# Patient Record
Sex: FEMALE | Race: BLACK OR AFRICAN AMERICAN | Employment: PART TIME | URBAN - METROPOLITAN AREA
[De-identification: names, ages, dates, MRNs, and addresses within clinical notes are randomized per-mention and may not be internally consistent; named-entity substitution may affect disease eponyms.]

---

## 2019-03-12 ENCOUNTER — HOSPITAL ENCOUNTER (OUTPATIENT)
Dept: CT IMAGING | Age: 59
Discharge: HOME | End: 2019-03-12
Attending: ORTHOPAEDIC SURGERY
Payer: COMMERCIAL

## 2019-03-12 DIAGNOSIS — M17.12 OSTEOARTHRITIS OF LEFT KNEE: ICD-10-CM

## 2019-03-12 PROCEDURE — 73700 CT LOWER EXTREMITY W/O DYE: CPT

## 2019-03-27 ENCOUNTER — HOSPITAL ENCOUNTER (OUTPATIENT)
Dept: NON INVASIVE DIAGNOSTICS | Age: 59
Discharge: HOME | End: 2019-03-27
Attending: ORTHOPAEDIC SURGERY
Payer: COMMERCIAL

## 2019-03-27 ENCOUNTER — HOSPITAL ENCOUNTER (OUTPATIENT)
Dept: PREADMISSION TESTING | Age: 59
Discharge: HOME | End: 2019-03-27
Payer: COMMERCIAL

## 2019-03-27 ENCOUNTER — HOSPITAL ENCOUNTER (OUTPATIENT)
Dept: GENERAL RADIOLOGY | Age: 59
Discharge: HOME | End: 2019-03-27
Attending: ORTHOPAEDIC SURGERY
Payer: COMMERCIAL

## 2019-03-27 VITALS
DIASTOLIC BLOOD PRESSURE: 85 MMHG | TEMPERATURE: 98 F | WEIGHT: 216 LBS | BODY MASS INDEX: 36.88 KG/M2 | SYSTOLIC BLOOD PRESSURE: 176 MMHG | HEIGHT: 64 IN | RESPIRATION RATE: 16 BRPM | OXYGEN SATURATION: 100 % | HEART RATE: 60 BPM

## 2019-03-27 LAB
ABO + RH BLD: NORMAL
ALBUMIN SERPL-MCNC: 4.2 G/DL (ref 3.5–5)
ALBUMIN/GLOB SERPL: 1.4 {RATIO} (ref 1.1–2.2)
ALP SERPL-CCNC: 50 U/L (ref 45–117)
ALT SERPL-CCNC: 19 U/L (ref 12–78)
ANION GAP SERPL CALC-SCNC: 3 MMOL/L (ref 5–15)
APPEARANCE UR: CLEAR
APTT PPP: 26.5 SEC (ref 22.1–32)
AST SERPL-CCNC: 10 U/L (ref 15–37)
BACTERIA URNS QL MICRO: NEGATIVE /HPF
BASOPHILS # BLD: 0 K/UL (ref 0–0.1)
BASOPHILS NFR BLD: 1 % (ref 0–1)
BILIRUB SERPL-MCNC: 0.4 MG/DL (ref 0.2–1)
BILIRUB UR QL: NEGATIVE
BLOOD GROUP ANTIBODIES SERPL: NORMAL
BUN SERPL-MCNC: 15 MG/DL (ref 6–20)
BUN/CREAT SERPL: 24 (ref 12–20)
CALCIUM SERPL-MCNC: 9.8 MG/DL (ref 8.5–10.1)
CHLORIDE SERPL-SCNC: 103 MMOL/L (ref 97–108)
CO2 SERPL-SCNC: 34 MMOL/L (ref 21–32)
COLOR UR: NORMAL
CREAT SERPL-MCNC: 0.63 MG/DL (ref 0.55–1.02)
DIFFERENTIAL METHOD BLD: ABNORMAL
EOSINOPHIL # BLD: 0.1 K/UL (ref 0–0.4)
EOSINOPHIL NFR BLD: 2 % (ref 0–7)
EPITH CASTS URNS QL MICRO: NORMAL /LPF
ERYTHROCYTE [DISTWIDTH] IN BLOOD BY AUTOMATED COUNT: 13.9 % (ref 11.5–14.5)
GLOBULIN SER CALC-MCNC: 3.1 G/DL (ref 2–4)
GLUCOSE SERPL-MCNC: 82 MG/DL (ref 65–100)
GLUCOSE UR STRIP.AUTO-MCNC: NEGATIVE MG/DL
HCG UR QL: NEGATIVE
HCT VFR BLD AUTO: 38.1 % (ref 35–47)
HGB BLD-MCNC: 12.2 G/DL (ref 11.5–16)
HGB UR QL STRIP: NEGATIVE
HYALINE CASTS URNS QL MICRO: NORMAL /LPF (ref 0–5)
IMM GRANULOCYTES # BLD AUTO: 0 K/UL (ref 0–0.04)
IMM GRANULOCYTES NFR BLD AUTO: 0 % (ref 0–0.5)
INR PPP: 1 (ref 0.9–1.1)
KETONES UR QL STRIP.AUTO: NEGATIVE MG/DL
LEUKOCYTE ESTERASE UR QL STRIP.AUTO: NEGATIVE
LYMPHOCYTES # BLD: 2.5 K/UL (ref 0.8–3.5)
LYMPHOCYTES NFR BLD: 59 % (ref 12–49)
MCH RBC QN AUTO: 28.2 PG (ref 26–34)
MCHC RBC AUTO-ENTMCNC: 32 G/DL (ref 30–36.5)
MCV RBC AUTO: 88 FL (ref 80–99)
MONOCYTES # BLD: 0.3 K/UL (ref 0–1)
MONOCYTES NFR BLD: 6 % (ref 5–13)
NEUTS SEG # BLD: 1.3 K/UL (ref 1.8–8)
NEUTS SEG NFR BLD: 32 % (ref 32–75)
NITRITE UR QL STRIP.AUTO: NEGATIVE
NRBC # BLD: 0 K/UL (ref 0–0.01)
NRBC BLD-RTO: 0 PER 100 WBC
PH UR STRIP: 7 [PH] (ref 5–8)
PLATELET # BLD AUTO: 264 K/UL (ref 150–400)
PMV BLD AUTO: 11 FL (ref 8.9–12.9)
POTASSIUM SERPL-SCNC: 3.5 MMOL/L (ref 3.5–5.1)
PROT SERPL-MCNC: 7.3 G/DL (ref 6.4–8.2)
PROT UR STRIP-MCNC: NEGATIVE MG/DL
PROTHROMBIN TIME: 10.2 SEC (ref 9–11.1)
RBC # BLD AUTO: 4.33 M/UL (ref 3.8–5.2)
RBC #/AREA URNS HPF: NORMAL /HPF (ref 0–5)
SODIUM SERPL-SCNC: 140 MMOL/L (ref 136–145)
SP GR UR REFRACTOMETRY: 1.01 (ref 1–1.03)
SPECIMEN EXP DATE BLD: NORMAL
THERAPEUTIC RANGE,PTTT: NORMAL SECS (ref 58–77)
UA: UC IF INDICATED,UAUC: NORMAL
UROBILINOGEN UR QL STRIP.AUTO: 1 EU/DL (ref 0.2–1)
WBC # BLD AUTO: 4.2 K/UL (ref 3.6–11)
WBC URNS QL MICRO: NORMAL /HPF (ref 0–4)

## 2019-03-27 PROCEDURE — 83036 HEMOGLOBIN GLYCOSYLATED A1C: CPT

## 2019-03-27 PROCEDURE — 36415 COLL VENOUS BLD VENIPUNCTURE: CPT

## 2019-03-27 PROCEDURE — 81001 URINALYSIS AUTO W/SCOPE: CPT

## 2019-03-27 PROCEDURE — 81025 URINE PREGNANCY TEST: CPT

## 2019-03-27 PROCEDURE — 86900 BLOOD TYPING SEROLOGIC ABO: CPT

## 2019-03-27 PROCEDURE — 71046 X-RAY EXAM CHEST 2 VIEWS: CPT

## 2019-03-27 PROCEDURE — 85610 PROTHROMBIN TIME: CPT

## 2019-03-27 PROCEDURE — 85730 THROMBOPLASTIN TIME PARTIAL: CPT

## 2019-03-27 PROCEDURE — 80053 COMPREHEN METABOLIC PANEL: CPT

## 2019-03-27 PROCEDURE — 85025 COMPLETE CBC W/AUTO DIFF WBC: CPT

## 2019-03-27 PROCEDURE — 93005 ELECTROCARDIOGRAM TRACING: CPT

## 2019-03-27 RX ORDER — GLUCOSAMINE/CHONDR SU A SOD 750-600 MG
2 TABLET ORAL
COMMUNITY
End: 2021-07-12

## 2019-03-27 RX ORDER — ASPIRIN 81 MG/1
TABLET ORAL DAILY
COMMUNITY
End: 2019-04-02

## 2019-03-27 RX ORDER — ACETAMINOPHEN 325 MG/1
TABLET ORAL
COMMUNITY
End: 2019-04-02

## 2019-03-27 RX ORDER — HYDROCHLOROTHIAZIDE 50 MG/1
TABLET ORAL DAILY
COMMUNITY
End: 2019-07-05 | Stop reason: SDUPTHER

## 2019-03-27 RX ORDER — LISINOPRIL 40 MG/1
TABLET ORAL
COMMUNITY
End: 2019-05-02 | Stop reason: SDUPTHER

## 2019-03-27 RX ORDER — DICLOFENAC SODIUM 75 MG/1
TABLET, DELAYED RELEASE ORAL 2 TIMES DAILY
COMMUNITY
End: 2019-04-02 | Stop reason: ALTCHOICE

## 2019-03-27 NOTE — FACE TO FACE
The patient attended the pre-operative joint replacement class. The content of the class was presented using a power point presentation and visual demonstrations specific for patients undergoing total hip and knee replacement surgery. A pre-operative Patient  education booklet specific to hip and knee replacement was given to patient. Incentive spirometer and CHG bath kit were given to patient with written instructions and a demonstration of the equipment was done in class. During class, patient had opportunities to ask questions of the material presented as well as any other concerns about their upcoming surgery. Physical Therapy present in class and educated patient and caregivers primarily on [address], pain management, exercises, mobility expectations, caregiver education, and home safety.  Patient and family attended class.

## 2019-03-27 NOTE — PERIOP NOTES
[address] , [address] [facility]  PRE-ADMISSION TESTING    [phone]     Surgery Date:   Friday, 4/12/19    [address] [facility] staff will call you between 3 and 7pm the Thursday before your surgery with your arrival time. Please call [phone] after 7pm Thursday if you did not receive your arrival time. INSTRUCTIONS BEFORE YOUR SURGERY   When You  Arrive   Arrive at the 2nd [address] [facility] on the day of your surgery  Have your insurance card, photo ID, and any copayment (if needed)     Food   and   Drink   NO food or drink after midnight the night before surgery    This means NO water, gum, mints, coffee, juice, etc.  No alcohol (beer, wine, liquor) 24 hours before and after surgery     Medications to   TAKE   Morning of Surgery   MEDICATIONS TO TAKE THE MORNING OF SURGERY WITH A SIP OF WATER:    HCTZ     Medications  To  STOP  Friday 4/5/19    Non-Steroidal anti-inflammatory Drugs (NSAID's): for example, Ibuprofen (Advil, Motrin), Naproxen (Aleve)   Aspirin, if taking for pain    Herbal supplements, vitamins, and fish oil     This includes Erich's arthritis concoction, cod liver, black seed oil    (Tylenol products may be taken, if needed.)       Bathing Clothing  Jewelry  Valuables      If you shower the morning of surgery, please do not apply anything to your skin (lotions, powders, deodorant, or makeup, especially mascara)   Do not shave or trim anywhere 24 hours before surgery   Wear your hair loose or down; no pony-tails, buns, or metal hair clips   Wear loose, comfortable clothes   Leave money, valuables, and jewelry, including body piercings, at home     Spending the Night Your doctor is keeping you into the hospital after surgery, please leave personal belongings/luggage in your car until you have a hospital room number.     Hospital discharge time is 12 noon  Drivers must be here before 12 noon unless you are told differently Special Instructions · Use Chlorhexidine Care Fusion wash 3 days prior to surgery as instructed. · Incentive spirometer given with instructions to practice at home and bring back to the hospital on the day of surgery. · [facility] will contact you if your Hemoglobin A1C is greater than 7.5. · Nutritional information, Pain pamphlet, & Call Don't Fall reminder given. ·  parking is complimentary Monday - Friday 7 am - 5 pm.  · Bring Medication list (with last doses) on the day of surgery. · If you become ill the week before your surgery, please call Dr. Lainey Garzon to discuss. If a situation occurs and you are delayed the day of surgery, call [phone]. The patient was contacted  in person. Home medication reviewed and verified during PAT appointment. The patient verbalizes understanding of all instructions and does not  need reinforcement.

## 2019-03-28 LAB
ATRIAL RATE: 54 BPM
BACTERIA SPEC CULT: NORMAL
BACTERIA SPEC CULT: NORMAL
CALCULATED P AXIS, ECG09: 57 DEGREES
CALCULATED R AXIS, ECG10: 86 DEGREES
CALCULATED T AXIS, ECG11: 16 DEGREES
DIAGNOSIS, 93000: NORMAL
EST. AVERAGE GLUCOSE BLD GHB EST-MCNC: 128 MG/DL
HBA1C MFR BLD: 6.1 % (ref 4.2–6.3)
P-R INTERVAL, ECG05: 148 MS
Q-T INTERVAL, ECG07: 446 MS
QRS DURATION, ECG06: 90 MS
QTC CALCULATION (BEZET), ECG08: 422 MS
SERVICE CMNT-IMP: NORMAL
VENTRICULAR RATE, ECG03: 54 BPM

## 2019-03-28 NOTE — H&P
PAT Pre-Op History & Physical    Patient: Yakov Lawson                  MRN: [id number]          SSN: [id number]  YOB: 1960          Age: 64 y.o. Sex: female                Subjective:   Patient is a 64 y.o.  female who presents with history of chronic left knee pain for many years. She started working at [employer] one year ago and she states standing on the concrete floors has made the pain worse. The pain is constant. She notes some swelling but denies buckling. She had a fall about 6 weeks ago. Her pain is a 10/10. She can only walk short distances. She states neither foot will  on its own, she drags her toes a lot. She has failed injections, lidocaine patches, Tylenol and NSAID. The patient was evaluated in the surgeon's office and it was determined that the most appropriate plan of care is to proceed with surgical intervention. Patient's PCP Ward Andino MD      Past Medical History:   Diagnosis Date    Chronic constipation     Hypertension     Obesity, Class II, BMI 35-39.9     Pre-diabetes 2019    A1C- 6.1    PVC (premature ventricular contraction)     States never been to cardio- has had for many years      Past Surgical History:   Procedure Laterality Date    HX  SECTION N/A     x 3    HX COLONOSCOPY      HX TONSILLECTOMY      HX WISDOM TEETH EXTRACTION Bilateral       Prior to Admission medications    Medication Sig Start Date End Date Taking? Authorizing Provider   verapamil HCl (VERAPAMIL PO) Take 75 mg by mouth daily. Yes Provider, Historical   hydroCHLOROthiazide (HYDRODIURIL) 50 mg tablet Take  by mouth daily. Yes Provider, Historical   lisinopril (PRINIVIL, ZESTRIL) 40 mg tablet Take  by mouth daily. Yes Provider, Historical   diclofenac EC (VOLTAREN) 75 mg EC tablet Take  by mouth two (2) times a day.    Yes Provider, Historical   acetaminophen (TYLENOL) 325 mg tablet Take  by mouth every four (4) hours as needed for Pain. Yes Provider, Historical   aspirin delayed-release 81 mg tablet Take  by mouth daily. Yes Provider, Historical   cannabidiol, CBD, extract 100 mg/mL soln Take  by mouth. Yes Provider, Historical   COD LIVER OIL, BULK, by Does Not Apply route. Yes Provider, Historical   OTHER ginkgo biloba   Yes Provider, Historical   OTHER Black seed oil   Yes Provider, Historical   acetaminophen 500 mg tab 500 mg, diphenhydrAMINE 25 mg cap 25 mg Take  by mouth nightly as needed. Yes Provider, Historical   glucosamine/chondr brown A sod (GLUCOSAMINE-CHONDROITIN) 750-600 mg tab Take 2 Tabs by mouth. Yes Provider, Historical     Current Outpatient Medications   Medication Sig    verapamil HCl (VERAPAMIL PO) Take 75 mg by mouth daily.  hydroCHLOROthiazide (HYDRODIURIL) 50 mg tablet Take  by mouth daily.  lisinopril (PRINIVIL, ZESTRIL) 40 mg tablet Take  by mouth daily.  diclofenac EC (VOLTAREN) 75 mg EC tablet Take  by mouth two (2) times a day.  acetaminophen (TYLENOL) 325 mg tablet Take  by mouth every four (4) hours as needed for Pain.  aspirin delayed-release 81 mg tablet Take  by mouth daily.  cannabidiol, CBD, extract 100 mg/mL soln Take  by mouth.  COD LIVER OIL, BULK, by Does Not Apply route.  OTHER ginkgo biloba    OTHER Black seed oil    acetaminophen 500 mg tab 500 mg, diphenhydrAMINE 25 mg cap 25 mg Take  by mouth nightly as needed.  glucosamine/chondr brown A sod (GLUCOSAMINE-CHONDROITIN) 750-600 mg tab Take 2 Tabs by mouth. No current facility-administered medications for this encounter.        No Known Allergies   Social History     Tobacco Use    Smoking status: Never Smoker    Smokeless tobacco: Never Used   Substance Use Topics    Alcohol use: Never     Frequency: Never      Social History     Substance and Sexual Activity   Drug Use Never     Family History   Problem Relation Age of Onset    Diabetes Mother          Review of Systems    Patient denies difficulty swallowing, mouth sores, or loose teeth. Patient denies any recent dental procedures or any planned prior to surgery. Patient denies chest pain, tightness, pain radiating down left arm, palpitations. Denies dizziness, visual disturbances, or lightheadedness. Patient denies shortness of breath, wheezing, cough, fever, or chills. Patient denies diarrhea, constipation, or abdominal pain. Patient denies urinary problems including dysuria, hesitancy, urgency, or incontinence. Denies skin breakdown, rashes, insect bites or open area. Objective: Eura Chetan Visit Vitals  /85 (BP 1 Location: Left arm, BP Patient Position: At rest;Sitting)   Pulse 60   Temp 98 °F (36.7 °C)   Resp 16   Ht 5' 4\" (1.626 m)   Wt 98 kg (216 lb)   SpO2 100%   BMI 37.08 kg/m²       Body mass index is 37.08 kg/m². Wt Readings from Last 1 Encounters:   03/27/19 98 kg (216 lb)        Physical Exam:     General: Pleasant,  cooperative, no apparent distress, appears stated age. Eyes: Conjunctivae/corneas clear. EOMs intact. Nose: Nares normal.   Mouth/Throat: Lips, mucosa, and tongue normal. Teeth and gums normal.   Lungs: Clear to auscultation bilaterally. Heart: Regular rate and rhythm, S1, S2 normal. No murmur, click, rub or gallop. Abdomen: Soft, non-tender. Bowel sounds normal. No distention. Musculoskeletal:  Gait antalgic. Extremities:  Extremities normal, atraumatic, no cyanosis or edema. Calves                                 supple, non tender to palpation. Pulses: 2+ and symmetric bilateral upper extremities. Cap. refill <2 seconds   Skin: Skin color, texture, turgor normal. No visible open areas, examined fully clothed   Neurologic: CN II-XII grossly intact. Alert and oriented x3.     Labs:   Recent Results (from the past 72 hour(s))   TYPE & SCREEN    Collection Time: 03/27/19  3:10 PM   Result Value Ref Range    Crossmatch Expiration 04/10/2019     ABO/Rh(D) AB POSITIVE     Antibody screen NEG CBC WITH AUTOMATED DIFF    Collection Time: 03/27/19  3:10 PM   Result Value Ref Range    WBC 4.2 3.6 - 11.0 K/uL    RBC 4.33 3.80 - 5.20 M/uL    HGB 12.2 11.5 - 16.0 g/dL    HCT 38.1 35.0 - 47.0 %    MCV 88.0 80.0 - 99.0 FL    MCH 28.2 26.0 - 34.0 PG    MCHC 32.0 30.0 - 36.5 g/dL    RDW 13.9 11.5 - 14.5 %    PLATELET 495 158 - 091 K/uL    MPV 11.0 8.9 - 12.9 FL    NRBC 0.0 0  WBC    ABSOLUTE NRBC 0.00 0.00 - 0.01 K/uL    NEUTROPHILS 32 32 - 75 %    LYMPHOCYTES 59 (H) 12 - 49 %    MONOCYTES 6 5 - 13 %    EOSINOPHILS 2 0 - 7 %    BASOPHILS 1 0 - 1 %    IMMATURE GRANULOCYTES 0 0.0 - 0.5 %    ABS. NEUTROPHILS 1.3 (L) 1.8 - 8.0 K/UL    ABS. LYMPHOCYTES 2.5 0.8 - 3.5 K/UL    ABS. MONOCYTES 0.3 0.0 - 1.0 K/UL    ABS. EOSINOPHILS 0.1 0.0 - 0.4 K/UL    ABS. BASOPHILS 0.0 0.0 - 0.1 K/UL    ABS. IMM. GRANS. 0.0 0.00 - 0.04 K/UL    DF AUTOMATED     METABOLIC PANEL, COMPREHENSIVE    Collection Time: 03/27/19  3:10 PM   Result Value Ref Range    Sodium 140 136 - 145 mmol/L    Potassium 3.5 3.5 - 5.1 mmol/L    Chloride 103 97 - 108 mmol/L    CO2 34 (H) 21 - 32 mmol/L    Anion gap 3 (L) 5 - 15 mmol/L    Glucose 82 65 - 100 mg/dL    BUN 15 6 - 20 MG/DL    Creatinine 0.63 0.55 - 1.02 MG/DL    BUN/Creatinine ratio 24 (H) 12 - 20      GFR est AA >60 >60 ml/min/1.73m2    GFR est non-AA >60 >60 ml/min/1.73m2    Calcium 9.8 8.5 - 10.1 MG/DL    Bilirubin, total 0.4 0.2 - 1.0 MG/DL    ALT (SGPT) 19 12 - 78 U/L    AST (SGOT) 10 (L) 15 - 37 U/L    Alk.  phosphatase 50 45 - 117 U/L    Protein, total 7.3 6.4 - 8.2 g/dL    Albumin 4.2 3.5 - 5.0 g/dL    Globulin 3.1 2.0 - 4.0 g/dL    A-G Ratio 1.4 1.1 - 2.2     HCG URINE, QL    Collection Time: 03/27/19  3:10 PM   Result Value Ref Range    HCG urine, QL NEGATIVE  NEG     HEMOGLOBIN A1C WITH EAG    Collection Time: 03/27/19  3:10 PM   Result Value Ref Range    Hemoglobin A1c 6.1 4.2 - 6.3 %    Est. average glucose 128 mg/dL   CULTURE, MRSA    Collection Time: 03/27/19  3:10 PM   Result Value Ref Range    Special Requests: NO SPECIAL REQUESTS      Culture result: MRSA NOT PRESENT  AT 17 HOURS     PROTHROMBIN TIME + INR    Collection Time: 03/27/19  3:10 PM   Result Value Ref Range    INR 1.0 0.9 - 1.1      Prothrombin time 10.2 9.0 - 11.1 sec   PTT    Collection Time: 03/27/19  3:10 PM   Result Value Ref Range    aPTT 26.5 22.1 - 32.0 sec    aPTT, therapeutic range     58.0 - 77.0 SECS   URINALYSIS W/ REFLEX CULTURE    Collection Time: 03/27/19  3:10 PM   Result Value Ref Range    Color YELLOW/STRAW      Appearance CLEAR CLEAR      Specific gravity 1.009 1.003 - 1.030      pH (UA) 7.0 5.0 - 8.0      Protein NEGATIVE  NEG mg/dL    Glucose NEGATIVE  NEG mg/dL    Ketone NEGATIVE  NEG mg/dL    Bilirubin NEGATIVE  NEG      Blood NEGATIVE  NEG      Urobilinogen 1.0 0.2 - 1.0 EU/dL    Nitrites NEGATIVE  NEG      Leukocyte Esterase NEGATIVE  NEG      WBC 0-4 0 - 4 /hpf    RBC 0-5 0 - 5 /hpf    Epithelial cells FEW FEW /lpf    Bacteria NEGATIVE  NEG /hpf    UA:UC IF INDICATED CULTURE NOT INDICATED BY UA RESULT CNI      Hyaline cast 0-2 0 - 5 /lpf   EKG, 12 LEAD, INITIAL    Collection Time: 03/27/19  4:02 PM   Result Value Ref Range    Ventricular Rate 54 BPM    Atrial Rate 54 BPM    P-R Interval 148 ms    QRS Duration 90 ms    Q-T Interval 446 ms    QTC Calculation (Bezet) 422 ms    Calculated P Axis 57 degrees    Calculated R Axis 86 degrees    Calculated T Axis 16 degrees    Diagnosis       Sinus bradycardia  Minor nonspecific st&t changes  Otherwise normal ECG  No previous ECGs available  Confirmed by Catalino Mota MD, [id number] ([id number]) on 3/28/2019 8:34:06 AM         Assessment:     OA Left Knee    Plan:     Scheduled for Left Total Knee Arthroplasty    Labs and EKG reviewed. A1C shows pre-dm. She does not have a documented PCP at this time.  MRSA pending    Danyelle Galaviz NP

## 2019-04-02 ENCOUNTER — OFFICE VISIT (OUTPATIENT)
Dept: INTERNAL MEDICINE CLINIC | Age: 59
End: 2019-04-02

## 2019-04-02 VITALS
WEIGHT: 216 LBS | HEIGHT: 64 IN | DIASTOLIC BLOOD PRESSURE: 90 MMHG | BODY MASS INDEX: 36.88 KG/M2 | TEMPERATURE: 97.8 F | SYSTOLIC BLOOD PRESSURE: 151 MMHG | HEART RATE: 77 BPM | OXYGEN SATURATION: 99 % | RESPIRATION RATE: 18 BRPM

## 2019-04-02 DIAGNOSIS — Z12.39 SCREENING FOR BREAST CANCER: ICD-10-CM

## 2019-04-02 DIAGNOSIS — I10 ESSENTIAL HYPERTENSION: Primary | ICD-10-CM

## 2019-04-02 PROBLEM — E66.01 SEVERE OBESITY (HCC): Status: ACTIVE | Noted: 2019-04-02

## 2019-04-02 RX ORDER — CARVEDILOL 3.12 MG/1
3.12 TABLET ORAL 2 TIMES DAILY WITH MEALS
Qty: 60 TAB | Refills: 1 | Status: SHIPPED | OUTPATIENT
Start: 2019-04-02 | End: 2019-05-22 | Stop reason: SDUPTHER

## 2019-04-02 RX ORDER — LIDOCAINE 50 MG/G
1 PATCH TOPICAL
COMMUNITY
Start: 2019-02-12 | End: 2019-04-13

## 2019-04-02 RX ORDER — MELOXICAM 15 MG/1
15 TABLET ORAL
COMMUNITY
Start: 2019-02-05 | End: 2019-04-02

## 2019-04-02 RX ORDER — VERAPAMIL HYDROCHLORIDE 240 MG/1
240 TABLET, FILM COATED, EXTENDED RELEASE ORAL
Qty: 30 TAB | Refills: 0
Start: 2019-04-02 | End: 2019-07-05 | Stop reason: SDUPTHER

## 2019-04-02 NOTE — LETTER
NOTIFICATION RETURN TO WORK / SCHOOL 
 
4/2/2019 9:00 AM 
 
Ms. Karina Fink 
[address] [facility] [id number] To Whom It May Concern: 
 
Velia Austin is currently under the care of [facility]. She will not be able to work on 04/03, 04/04, 04/04 due to severe knee pain. She has a knee replacement scheduled for next week. If there are questions or concerns please have the patient contact our office. Sincerely, Vadim Trujillo MD

## 2019-04-02 NOTE — PROGRESS NOTES
Ms. Radha Regan is a new patient who is here to establish care. CC: 
Establish Care and Pre-op Exam (knee surgery ) HTN 
  
HPI: 
 
HTN: noted higher since having increased pain in the left knee. Currently on lisinopril 40mg, HCTZ 50mg and verapamil 240 mg Patient's blood pressure is elevated today 151/90 on repeat manually No dyspnea and no CP Has been working on healthy diet, stopped bread and sugar and sodas. Left knee pain is severe and will have replacement next week Benito Works at [employer]  Has 6 children Reports colonoscopy last year normal at [facility] - could not located records Overdue for mammogram 
Pap 2 year ago Cannot recall TDAP shot Review of systems: 
Constitutional: negative for fever, chills, weight loss, night sweats Eyes : negative for vision changes, eye pain and discharge Nose and Throat: negative for tinnitus, sore throat Cardiovascular: negative for chest pain, palpitations and shortness of breath Respiratory: negative for shortness of breath, cough and wheezing Gastroinstestinal: negative for abdominal pain, nausea, vomiting, diarrhea, constipation, and blood in the stool Musculoskeletal:see HPI Genitourinary: negative for dysuria, nocturia, polyuria and hematuria Neurologic: Negative for focal weakness, numbness or incoordination Skin: negative for rash, pruritus Hematologic: negative for easy bruising Past Medical History:  
Diagnosis Date  Chronic constipation  Hypertension  Obesity, Class II, BMI 35-39.9  Pre-diabetes 2019 A1C- 6.1  PVC (premature ventricular contraction) States never been to cardio- has had for many years Past Surgical History:  
Procedure Laterality Date  HX  SECTION N/A   
 x 3  
 HX COLONOSCOPY    
 HX TONSILLECTOMY  HX WISDOM TEETH EXTRACTION Bilateral   
 
 
No Known Allergies Current Outpatient Medications on File Prior to Visit Medication Sig Dispense Refill  lidocaine (LIDODERM) 5 % Apply 1 Patch to affected area.  hydroCHLOROthiazide (HYDRODIURIL) 50 mg tablet Take  by mouth daily.  lisinopril (PRINIVIL, ZESTRIL) 40 mg tablet Take  by mouth daily.  cannabidiol, CBD, extract 100 mg/mL soln Take  by mouth.  COD LIVER OIL, BULK, by Does Not Apply route.  OTHER ginkgo biloba    
 OTHER Black seed oil  glucosamine/chondr brown A sod (GLUCOSAMINE-CHONDROITIN) 750-600 mg tab Take 2 Tabs by mouth. No current facility-administered medications on file prior to visit. family history includes Diabetes in her mother. Social History Socioeconomic History  Marital status:  Spouse name: Not on file  Number of children: Not on file  Years of education: Not on file  Highest education level: Not on file Occupational History  Not on file Social Needs  Financial resource strain: Not on file  Food insecurity:  
  Worry: Not on file Inability: Not on file  Transportation needs:  
  Medical: Not on file Non-medical: Not on file Tobacco Use  Smoking status: Never Smoker  Smokeless tobacco: Never Used Substance and Sexual Activity  Alcohol use: Never Frequency: Never  Drug use: Never  Sexual activity: Yes Lifestyle  Physical activity:  
  Days per week: Not on file Minutes per session: Not on file  Stress: Not on file Relationships  Social connections:  
  Talks on phone: Not on file Gets together: Not on file Attends Scientologist service: Not on file Active member of club or organization: Not on file Attends meetings of clubs or organizations: Not on file Relationship status: Not on file  Intimate partner violence:  
  Fear of current or ex partner: Not on file Emotionally abused: Not on file Physically abused: Not on file Forced sexual activity: Not on file Other Topics Concern  Not on file Social History Narrative  Not on file Visit Vitals /90 (BP 1 Location: Right arm, BP Patient Position: Sitting) Pulse 77 Temp 97.8 °F (36.6 °C) (Oral) Resp 18 Ht 5' 4\" (1.626 m) Wt 216 lb (98 kg) SpO2 99% BMI 37.08 kg/m² General:  Well appearing female no acute distress HEENT:   PERRL,normal conjunctiva. External ear and canals normal, TMs normal.  Hearing normal to voice. Nose without edema or discharge, normal septum. Lips, teeth, gums normal.  Oropharynx: no erythema, no exudates, no lesions, normal tongue. Neck:  Supple. Thyroid normal size, nontender, without nodules. No carotid bruit. No masses or lymphadenopathy Respiratory: no respiratory distress,  no wheezing, no rhonchi, no rales. No chest wall tenderness. Cardiovascular:  RRR, normal S1S2, no murmur. Gastrointestinal: normal bowel sounds, soft, nontender, without masses. No hepatosplenomegaly. Extremities +2 pulses, no edema, normal sensation Musculoskeletal:  Normal gait. Normal digits and nails. Normal strength and tone, no atrophy, and no abnormal movement. Skin:  No rash, no lesions, no ulcers. Skin warm, normal turgor, without induration or nodules. Neuro:  A and OX4, fluent speech, cranial nerves normal 2-12. Sensation normal to light touch. DTR symmetrical 
Psych:  Normal affect Lab Results Component Value Date/Time WBC 4.2 03/27/2019 03:10 PM  
 HGB 12.2 03/27/2019 03:10 PM  
 HCT 38.1 03/27/2019 03:10 PM  
 PLATELET 401 94/73/0645 03:10 PM  
 MCV 88.0 03/27/2019 03:10 PM  
 
Lab Results Component Value Date/Time  Sodium 140 03/27/2019 03:10 PM  
 Potassium 3.5 03/27/2019 03:10 PM  
 Chloride 103 03/27/2019 03:10 PM  
 CO2 34 (H) 03/27/2019 03:10 PM  
 Anion gap 3 (L) 03/27/2019 03:10 PM  
 Glucose 82 03/27/2019 03:10 PM  
 BUN 15 03/27/2019 03:10 PM  
 Creatinine 0.63 03/27/2019 03:10 PM  
 BUN/Creatinine ratio 24 (H) 03/27/2019 03:10 PM  
 GFR est AA >60 03/27/2019 03:10 PM  
 GFR est non-AA >60 03/27/2019 03:10 PM  
 Calcium 9.8 03/27/2019 03:10 PM  
 
No results found for: CHOL, CHOLPOCT, CHOLX, CHLST, CHOLV, HDL, HDLPOC, LDL, LDLCPOC, LDLC, DLDLP, VLDLC, VLDL, TGLX, TRIGL, TRIGP, TGLPOCT, CHHD, CHHDX No results found for: TSH, TSH2, TSH3, TSHP, TSHEXT, TSHEXT Lab Results Component Value Date/Time Hemoglobin A1c 6.1 03/27/2019 03:10 PM  
 
No results found for: Joe Willis, Mami Ortega, KENNETH Fong Assessment and Plan:  
65 yo woman with a hx of HTN and obesity presenting to [facility] care. 1. Essential hypertension 
- not well controlled, currently on verapamil, HTCT 50mg and lisinopril 40mg. Adding coreg low dose today. Patient will return for BP check and pulse check next week  
- carvedilol (COREG) 3.125 mg tablet; Take 1 Tab by mouth two (2) times daily (with meals). Dispense: 60 Tab; Refill: 1 2. Screening for breast cancer - [facility] MAMMO BI SCREENING INCL CAD; Future 3. Knee arthritis: has severe pain in left knee and planned knee replacement next week.   
Pt is low-intermediate risk for a low-intermediate risk surg with adequate functional mets, may proceed with surg without additional cardiac w/u 
 
 
4. Obesity: counseled on weight loss Follow-up and Dispositions · Return in about 1 week (around 4/9/2019) for Blood pressure check.  
  
  
 
Shiela Arvizu MD

## 2019-04-02 NOTE — PROGRESS NOTES
Reviewed record in preparation for visit and have obtained necessary documentation. Identified pt with two pt identifiers(name and ). Chief Complaint Patient presents with [facility] Establish Care  Pre-op Exam  
  knee surgery Health Maintenance Due Topic Date Due  
 Hepatitis C Test  1960  DTaP/Tdap/Td  (1 - Tdap) 1983  Pap Test  1983  Shingles Vaccine (1 of 2) 2012  Mammogram  2012  Stool testing for trace blood  2012 Ms. Alek Courtney has a reminder for a \"due or due soon\" health maintenance. I have asked that she discuss this further with her primary care provider for follow-up on this health maintenance. Coordination of Care Questionnaire: 
:  
 
1) Have you been to an emergency room, urgent care clinic since your last visit? no  
Hospitalized since your last visit? no          
 
2) Have you seen or consulted any other health care providers outside of [address] [place] [address] since your last visit? no  (Include any pap smears or colon screenings in this section.) 3) In the event something were to happen to you and you were unable to speak on your behalf, do you have an Advance Directive/ Living Will in place stating your wishes? NO Do you have an Advance Directive on file? no 
 
4) Are you interested in receiving information on Advance Directives? NO Patient is accompanied by spouse I have received verbal consent from Ирина Nagel to discuss any/all medical information while they are present in the room.

## 2019-04-02 NOTE — LETTER
4/2/2019 9:33 AM 
 
Ms. Karina Fink 
[address] [facility] [phone] To Whom it May Concern: 
 
Pascual Andrew is currently under the care of [facility]. She is not able to work on 04/03, 04/04, and 04/05 due to severe knee pain. She has a knee replacement scheduled for next week. If there are any questions or concerns please have the patient contact our office. Sincerely, Sigifredo Covarrubias MD

## 2019-04-08 NOTE — PERIOP NOTES
Patient called here to update her PTA med list. List updated and reviewed with patient to hold Lisinopril and HCTZ only on am of surgery. Will take other Cardio meds am of surgery including Coreg, and verapamil.

## 2019-04-09 ENCOUNTER — CLINICAL SUPPORT (OUTPATIENT)
Dept: INTERNAL MEDICINE CLINIC | Age: 59
End: 2019-04-09

## 2019-04-09 VITALS
RESPIRATION RATE: 18 BRPM | BODY MASS INDEX: 36.19 KG/M2 | DIASTOLIC BLOOD PRESSURE: 72 MMHG | WEIGHT: 212 LBS | HEART RATE: 64 BPM | SYSTOLIC BLOOD PRESSURE: 107 MMHG | OXYGEN SATURATION: 97 % | HEIGHT: 64 IN

## 2019-04-09 DIAGNOSIS — I10 ESSENTIAL HYPERTENSION: Primary | ICD-10-CM

## 2019-04-11 ENCOUNTER — ANESTHESIA EVENT (OUTPATIENT)
Dept: SURGERY | Age: 59
DRG: 470 | End: 2019-04-11
Payer: COMMERCIAL

## 2019-04-12 ENCOUNTER — HOSPITAL ENCOUNTER (INPATIENT)
Age: 59
LOS: 4 days | Discharge: HOME | DRG: 470 | End: 2019-04-16
Attending: ORTHOPAEDIC SURGERY | Admitting: ORTHOPAEDIC SURGERY
Payer: COMMERCIAL

## 2019-04-12 ENCOUNTER — ANESTHESIA (OUTPATIENT)
Dept: SURGERY | Age: 59
DRG: 470 | End: 2019-04-12
Payer: COMMERCIAL

## 2019-04-12 DIAGNOSIS — M17.11 PRIMARY OSTEOARTHRITIS OF RIGHT KNEE: Primary | ICD-10-CM

## 2019-04-12 LAB
ABO + RH BLD: NORMAL
BLOOD GROUP ANTIBODIES SERPL: NORMAL
SPECIMEN EXP DATE BLD: NORMAL

## 2019-04-12 PROCEDURE — 74011250637 HC RX REV CODE- 250/637: Performed by: ORTHOPAEDIC SURGERY

## 2019-04-12 PROCEDURE — 77030011640 HC PAD GRND REM COVD -A: Performed by: ORTHOPAEDIC SURGERY

## 2019-04-12 PROCEDURE — 77030029828 HC FEM TIB CKPNT KT DISP STRY -B: Performed by: ORTHOPAEDIC SURGERY

## 2019-04-12 PROCEDURE — 74011000250 HC RX REV CODE- 250: Performed by: ORTHOPAEDIC SURGERY

## 2019-04-12 PROCEDURE — 86900 BLOOD TYPING SEROLOGIC ABO: CPT

## 2019-04-12 PROCEDURE — 77030000032 HC CUF TRNQT ZIMM -B: Performed by: ORTHOPAEDIC SURGERY

## 2019-04-12 PROCEDURE — 77030039266 HC ADH SKN EXOFIN S2SG -A: Performed by: ORTHOPAEDIC SURGERY

## 2019-04-12 PROCEDURE — 74011000258 HC RX REV CODE- 258

## 2019-04-12 PROCEDURE — 77030033067 HC SUT PDO STRATFX SPIR J&J -B: Performed by: ORTHOPAEDIC SURGERY

## 2019-04-12 PROCEDURE — 76030000022 HC AMB SURG 2.5 TO 3 HR INTENSV-TIER 1: Performed by: ORTHOPAEDIC SURGERY

## 2019-04-12 PROCEDURE — 77030032490 HC SLV COMPR SCD KNE COVD -B: Performed by: ORTHOPAEDIC SURGERY

## 2019-04-12 PROCEDURE — 74011250636 HC RX REV CODE- 250/636: Performed by: ORTHOPAEDIC SURGERY

## 2019-04-12 PROCEDURE — 77030018822 HC SLV COMPR FT COVD -B

## 2019-04-12 PROCEDURE — 77030003028 HC SUT VCRL J&J -A: Performed by: ORTHOPAEDIC SURGERY

## 2019-04-12 PROCEDURE — C1776 JOINT DEVICE (IMPLANTABLE): HCPCS | Performed by: ORTHOPAEDIC SURGERY

## 2019-04-12 PROCEDURE — 74011250636 HC RX REV CODE- 250/636: Performed by: ANESTHESIOLOGY

## 2019-04-12 PROCEDURE — 74011250637 HC RX REV CODE- 250/637: Performed by: ANESTHESIOLOGY

## 2019-04-12 PROCEDURE — 77030020782 HC GWN BAIR PAWS FLX 3M -B

## 2019-04-12 PROCEDURE — 36415 COLL VENOUS BLD VENIPUNCTURE: CPT

## 2019-04-12 PROCEDURE — 77030013708 HC HNDPC SUC IRR PULS STRY –B: Performed by: ORTHOPAEDIC SURGERY

## 2019-04-12 PROCEDURE — 8E0Y0CZ ROBOTIC ASSISTED PROCEDURE OF LOWER EXTREMITY, OPEN APPROACH: ICD-10-PCS | Performed by: ORTHOPAEDIC SURGERY

## 2019-04-12 PROCEDURE — 74011000272 HC RX REV CODE- 272: Performed by: ORTHOPAEDIC SURGERY

## 2019-04-12 PROCEDURE — 76060000065 HC AMB SURG ANES 2.5 TO 3 HR: Performed by: ORTHOPAEDIC SURGERY

## 2019-04-12 PROCEDURE — 76210000037 HC AMBSU PH I REC 2 TO 2.5 HR: Performed by: ORTHOPAEDIC SURGERY

## 2019-04-12 PROCEDURE — 65270000029 HC RM PRIVATE

## 2019-04-12 PROCEDURE — 74011250636 HC RX REV CODE- 250/636

## 2019-04-12 PROCEDURE — 77030002933 HC SUT MCRYL J&J -A: Performed by: ORTHOPAEDIC SURGERY

## 2019-04-12 PROCEDURE — 77030018836 HC SOL IRR NACL ICUM -A: Performed by: ORTHOPAEDIC SURGERY

## 2019-04-12 PROCEDURE — C1713 ANCHOR/SCREW BN/BN,TIS/BN: HCPCS | Performed by: ORTHOPAEDIC SURGERY

## 2019-04-12 PROCEDURE — 77030031139 HC SUT VCRL2 J&J -A: Performed by: ORTHOPAEDIC SURGERY

## 2019-04-12 PROCEDURE — 77030038149 HC BLD SAW SAG STRY -D: Performed by: ORTHOPAEDIC SURGERY

## 2019-04-12 PROCEDURE — 77030006835 HC BLD SAW SAG STRY -B: Performed by: ORTHOPAEDIC SURGERY

## 2019-04-12 PROCEDURE — 77030028907 HC WRP KNEE WO BGS SOLM -B

## 2019-04-12 PROCEDURE — 77030014647 HC SEAL FBRN TISSL BAXT -D: Performed by: ORTHOPAEDIC SURGERY

## 2019-04-12 PROCEDURE — 74011000250 HC RX REV CODE- 250

## 2019-04-12 PROCEDURE — 77030029820: Performed by: ORTHOPAEDIC SURGERY

## 2019-04-12 PROCEDURE — 77030018673: Performed by: ORTHOPAEDIC SURGERY

## 2019-04-12 PROCEDURE — 0SRD0JZ REPLACEMENT OF LEFT KNEE JOINT WITH SYNTHETIC SUBSTITUTE, OPEN APPROACH: ICD-10-PCS | Performed by: ORTHOPAEDIC SURGERY

## 2019-04-12 PROCEDURE — 77030018846 HC SOL IRR STRL H20 ICUM -A: Performed by: ORTHOPAEDIC SURGERY

## 2019-04-12 DEVICE — CRUCIATE RETAINING FEMORAL
Type: IMPLANTABLE DEVICE | Site: KNEE | Status: FUNCTIONAL
Brand: TRIATHLON

## 2019-04-12 DEVICE — TIBIAL COMPONENT
Type: IMPLANTABLE DEVICE | Site: KNEE | Status: FUNCTIONAL
Brand: TRIATHLON

## 2019-04-12 DEVICE — COMPONENT TOT KNEE HYBRID POROUS X3 TRIATHLON: Type: IMPLANTABLE DEVICE | Site: KNEE | Status: FUNCTIONAL

## 2019-04-12 DEVICE — METAL BACKED PATELLA
Type: IMPLANTABLE DEVICE | Site: KNEE | Status: FUNCTIONAL
Brand: TRIATHLON

## 2019-04-12 DEVICE — TIBIAL BEARING INSERT
Type: IMPLANTABLE DEVICE | Site: KNEE | Status: FUNCTIONAL
Brand: TRIATHLON

## 2019-04-12 RX ORDER — FENTANYL CITRATE 50 UG/ML
INJECTION, SOLUTION INTRAMUSCULAR; INTRAVENOUS AS NEEDED
Status: DISCONTINUED | OUTPATIENT
Start: 2019-04-12 | End: 2019-04-12 | Stop reason: HOSPADM

## 2019-04-12 RX ORDER — ONDANSETRON 4 MG/1
4 TABLET, ORALLY DISINTEGRATING ORAL
Status: DISCONTINUED | OUTPATIENT
Start: 2019-04-14 | End: 2019-04-16 | Stop reason: HOSPADM

## 2019-04-12 RX ORDER — FAMOTIDINE 20 MG/1
20 TABLET, FILM COATED ORAL 2 TIMES DAILY
Status: DISCONTINUED | OUTPATIENT
Start: 2019-04-12 | End: 2019-04-16 | Stop reason: HOSPADM

## 2019-04-12 RX ORDER — ALBUTEROL SULFATE 0.83 MG/ML
2.5 SOLUTION RESPIRATORY (INHALATION) AS NEEDED
Status: DISCONTINUED | OUTPATIENT
Start: 2019-04-12 | End: 2019-04-12 | Stop reason: HOSPADM

## 2019-04-12 RX ORDER — NALOXONE HYDROCHLORIDE 0.4 MG/ML
0.4 INJECTION, SOLUTION INTRAMUSCULAR; INTRAVENOUS; SUBCUTANEOUS AS NEEDED
Status: DISCONTINUED | OUTPATIENT
Start: 2019-04-12 | End: 2019-04-16 | Stop reason: HOSPADM

## 2019-04-12 RX ORDER — KETOROLAC TROMETHAMINE 30 MG/ML
30 INJECTION, SOLUTION INTRAMUSCULAR; INTRAVENOUS EVERY 6 HOURS
Status: COMPLETED | OUTPATIENT
Start: 2019-04-12 | End: 2019-04-13

## 2019-04-12 RX ORDER — PROPOFOL 10 MG/ML
INJECTION, EMULSION INTRAVENOUS AS NEEDED
Status: DISCONTINUED | OUTPATIENT
Start: 2019-04-12 | End: 2019-04-12 | Stop reason: HOSPADM

## 2019-04-12 RX ORDER — FACIAL-BODY WIPES
10 EACH TOPICAL DAILY PRN
Status: DISCONTINUED | OUTPATIENT
Start: 2019-04-14 | End: 2019-04-16 | Stop reason: HOSPADM

## 2019-04-12 RX ORDER — GLYCOPYRROLATE 0.2 MG/ML
INJECTION INTRAMUSCULAR; INTRAVENOUS AS NEEDED
Status: DISCONTINUED | OUTPATIENT
Start: 2019-04-12 | End: 2019-04-12 | Stop reason: HOSPADM

## 2019-04-12 RX ORDER — OXYCODONE HYDROCHLORIDE 5 MG/1
5 TABLET ORAL
Status: DISCONTINUED | OUTPATIENT
Start: 2019-04-12 | End: 2019-04-13

## 2019-04-12 RX ORDER — POLYETHYLENE GLYCOL 3350 17 G/17G
17 POWDER, FOR SOLUTION ORAL DAILY
Status: DISCONTINUED | OUTPATIENT
Start: 2019-04-13 | End: 2019-04-16 | Stop reason: HOSPADM

## 2019-04-12 RX ORDER — SODIUM CHLORIDE, SODIUM LACTATE, POTASSIUM CHLORIDE, CALCIUM CHLORIDE 600; 310; 30; 20 MG/100ML; MG/100ML; MG/100ML; MG/100ML
150 INJECTION, SOLUTION INTRAVENOUS CONTINUOUS
Status: DISCONTINUED | OUTPATIENT
Start: 2019-04-12 | End: 2019-04-12 | Stop reason: HOSPADM

## 2019-04-12 RX ORDER — ACETAMINOPHEN 325 MG/1
975 TABLET ORAL ONCE
Status: COMPLETED | OUTPATIENT
Start: 2019-04-12 | End: 2019-04-12

## 2019-04-12 RX ORDER — SODIUM CHLORIDE 0.9 % (FLUSH) 0.9 %
5-40 SYRINGE (ML) INJECTION AS NEEDED
Status: DISCONTINUED | OUTPATIENT
Start: 2019-04-12 | End: 2019-04-16 | Stop reason: HOSPADM

## 2019-04-12 RX ORDER — SODIUM CHLORIDE, SODIUM LACTATE, POTASSIUM CHLORIDE, CALCIUM CHLORIDE 600; 310; 30; 20 MG/100ML; MG/100ML; MG/100ML; MG/100ML
125 INJECTION, SOLUTION INTRAVENOUS CONTINUOUS
Status: DISCONTINUED | OUTPATIENT
Start: 2019-04-12 | End: 2019-04-12 | Stop reason: HOSPADM

## 2019-04-12 RX ORDER — DIPHENHYDRAMINE HCL 12.5MG/5ML
12.5 ELIXIR ORAL
Status: DISCONTINUED | OUTPATIENT
Start: 2019-04-12 | End: 2019-04-16 | Stop reason: HOSPADM

## 2019-04-12 RX ORDER — ONDANSETRON 2 MG/ML
4 INJECTION INTRAMUSCULAR; INTRAVENOUS
Status: ACTIVE | OUTPATIENT
Start: 2019-04-12 | End: 2019-04-13

## 2019-04-12 RX ORDER — CEFAZOLIN SODIUM/WATER 2 G/20 ML
2 SYRINGE (ML) INTRAVENOUS ONCE
Status: COMPLETED | OUTPATIENT
Start: 2019-04-12 | End: 2019-04-12

## 2019-04-12 RX ORDER — MIDAZOLAM HYDROCHLORIDE 1 MG/ML
INJECTION, SOLUTION INTRAMUSCULAR; INTRAVENOUS AS NEEDED
Status: DISCONTINUED | OUTPATIENT
Start: 2019-04-12 | End: 2019-04-12 | Stop reason: HOSPADM

## 2019-04-12 RX ORDER — ONDANSETRON 2 MG/ML
4 INJECTION INTRAMUSCULAR; INTRAVENOUS AS NEEDED
Status: DISCONTINUED | OUTPATIENT
Start: 2019-04-12 | End: 2019-04-12 | Stop reason: HOSPADM

## 2019-04-12 RX ORDER — DEXAMETHASONE SODIUM PHOSPHATE 10 MG/ML
10 INJECTION INTRAMUSCULAR; INTRAVENOUS ONCE
Status: COMPLETED | OUTPATIENT
Start: 2019-04-13 | End: 2019-04-13

## 2019-04-12 RX ORDER — KETOROLAC TROMETHAMINE 30 MG/ML
15 INJECTION, SOLUTION INTRAMUSCULAR; INTRAVENOUS
Status: DISCONTINUED | OUTPATIENT
Start: 2019-04-12 | End: 2019-04-12 | Stop reason: HOSPADM

## 2019-04-12 RX ORDER — HYDROMORPHONE HYDROCHLORIDE 2 MG/ML
INJECTION, SOLUTION INTRAMUSCULAR; INTRAVENOUS; SUBCUTANEOUS AS NEEDED
Status: DISCONTINUED | OUTPATIENT
Start: 2019-04-12 | End: 2019-04-12 | Stop reason: HOSPADM

## 2019-04-12 RX ORDER — EPHEDRINE SULFATE 50 MG/ML
INJECTION, SOLUTION INTRAVENOUS AS NEEDED
Status: DISCONTINUED | OUTPATIENT
Start: 2019-04-12 | End: 2019-04-12 | Stop reason: HOSPADM

## 2019-04-12 RX ORDER — DEXTROMETHORPHAN HYDROBROMIDE, GUAIFENESIN 5; 100 MG/5ML; MG/5ML
650 LIQUID ORAL EVERY 8 HOURS
COMMUNITY

## 2019-04-12 RX ORDER — ONDANSETRON 2 MG/ML
INJECTION INTRAMUSCULAR; INTRAVENOUS AS NEEDED
Status: DISCONTINUED | OUTPATIENT
Start: 2019-04-12 | End: 2019-04-12 | Stop reason: HOSPADM

## 2019-04-12 RX ORDER — OXYCODONE HYDROCHLORIDE 5 MG/1
10 TABLET ORAL
Status: DISCONTINUED | OUTPATIENT
Start: 2019-04-12 | End: 2019-04-12 | Stop reason: HOSPADM

## 2019-04-12 RX ORDER — AMOXICILLIN 250 MG
1 CAPSULE ORAL 2 TIMES DAILY
Status: DISCONTINUED | OUTPATIENT
Start: 2019-04-12 | End: 2019-04-16 | Stop reason: HOSPADM

## 2019-04-12 RX ORDER — HYDROCHLOROTHIAZIDE 25 MG/1
50 TABLET ORAL DAILY
Status: DISCONTINUED | OUTPATIENT
Start: 2019-04-13 | End: 2019-04-16 | Stop reason: HOSPADM

## 2019-04-12 RX ORDER — KETOROLAC TROMETHAMINE 30 MG/ML
INJECTION, SOLUTION INTRAMUSCULAR; INTRAVENOUS AS NEEDED
Status: DISCONTINUED | OUTPATIENT
Start: 2019-04-12 | End: 2019-04-12 | Stop reason: HOSPADM

## 2019-04-12 RX ORDER — MORPHINE SULFATE 4 MG/ML
2 INJECTION INTRAVENOUS
Status: ACTIVE | OUTPATIENT
Start: 2019-04-12 | End: 2019-04-13

## 2019-04-12 RX ORDER — DICLOFENAC SODIUM 75 MG/1
75 TABLET, DELAYED RELEASE ORAL 2 TIMES DAILY
COMMUNITY
End: 2019-04-16

## 2019-04-12 RX ORDER — ACETAMINOPHEN 325 MG/1
650 TABLET ORAL EVERY 6 HOURS
Status: DISCONTINUED | OUTPATIENT
Start: 2019-04-12 | End: 2019-04-16 | Stop reason: HOSPADM

## 2019-04-12 RX ORDER — ASPIRIN 325 MG
325 TABLET, DELAYED RELEASE (ENTERIC COATED) ORAL 2 TIMES DAILY
Status: DISCONTINUED | OUTPATIENT
Start: 2019-04-12 | End: 2019-04-16 | Stop reason: HOSPADM

## 2019-04-12 RX ORDER — HYDROMORPHONE HYDROCHLORIDE 1 MG/ML
0.5 INJECTION, SOLUTION INTRAMUSCULAR; INTRAVENOUS; SUBCUTANEOUS ONCE
Status: COMPLETED | OUTPATIENT
Start: 2019-04-12 | End: 2019-04-12

## 2019-04-12 RX ORDER — LISINOPRIL 20 MG/1
40 TABLET ORAL DAILY
Status: DISCONTINUED | OUTPATIENT
Start: 2019-04-13 | End: 2019-04-16 | Stop reason: HOSPADM

## 2019-04-12 RX ORDER — SODIUM CHLORIDE 9 MG/ML
125 INJECTION, SOLUTION INTRAVENOUS CONTINUOUS
Status: DISPENSED | OUTPATIENT
Start: 2019-04-12 | End: 2019-04-13

## 2019-04-12 RX ORDER — KETAMINE HYDROCHLORIDE 10 MG/ML
INJECTION, SOLUTION INTRAMUSCULAR; INTRAVENOUS AS NEEDED
Status: DISCONTINUED | OUTPATIENT
Start: 2019-04-12 | End: 2019-04-12 | Stop reason: HOSPADM

## 2019-04-12 RX ORDER — ASPIRIN 81 MG/1
81 TABLET ORAL DAILY
COMMUNITY
End: 2019-04-16

## 2019-04-12 RX ORDER — CARVEDILOL 3.12 MG/1
3.12 TABLET ORAL 2 TIMES DAILY WITH MEALS
Status: DISCONTINUED | OUTPATIENT
Start: 2019-04-12 | End: 2019-04-16 | Stop reason: HOSPADM

## 2019-04-12 RX ORDER — DEXAMETHASONE SODIUM PHOSPHATE 4 MG/ML
INJECTION, SOLUTION INTRA-ARTICULAR; INTRALESIONAL; INTRAMUSCULAR; INTRAVENOUS; SOFT TISSUE AS NEEDED
Status: DISCONTINUED | OUTPATIENT
Start: 2019-04-12 | End: 2019-04-12 | Stop reason: HOSPADM

## 2019-04-12 RX ORDER — VERAPAMIL HYDROCHLORIDE 240 MG/1
240 TABLET, FILM COATED, EXTENDED RELEASE ORAL
Status: DISCONTINUED | OUTPATIENT
Start: 2019-04-12 | End: 2019-04-16 | Stop reason: HOSPADM

## 2019-04-12 RX ORDER — DIPHENHYDRAMINE HYDROCHLORIDE 50 MG/ML
12.5 INJECTION, SOLUTION INTRAMUSCULAR; INTRAVENOUS AS NEEDED
Status: DISCONTINUED | OUTPATIENT
Start: 2019-04-12 | End: 2019-04-12 | Stop reason: HOSPADM

## 2019-04-12 RX ORDER — OXYCODONE HCL 20 MG/1
20 TABLET, FILM COATED, EXTENDED RELEASE ORAL ONCE
Status: COMPLETED | OUTPATIENT
Start: 2019-04-12 | End: 2019-04-12

## 2019-04-12 RX ORDER — CEFAZOLIN SODIUM/WATER 2 G/20 ML
2 SYRINGE (ML) INTRAVENOUS EVERY 8 HOURS
Status: COMPLETED | OUTPATIENT
Start: 2019-04-12 | End: 2019-04-13

## 2019-04-12 RX ORDER — LIDOCAINE HYDROCHLORIDE 10 MG/ML
0.1 INJECTION, SOLUTION EPIDURAL; INFILTRATION; INTRACAUDAL; PERINEURAL AS NEEDED
Status: DISCONTINUED | OUTPATIENT
Start: 2019-04-12 | End: 2019-04-12 | Stop reason: HOSPADM

## 2019-04-12 RX ORDER — OXYCODONE HYDROCHLORIDE 5 MG/1
10 TABLET ORAL
Status: DISCONTINUED | OUTPATIENT
Start: 2019-04-12 | End: 2019-04-13

## 2019-04-12 RX ORDER — HYDROMORPHONE HYDROCHLORIDE 1 MG/ML
0.5 INJECTION, SOLUTION INTRAMUSCULAR; INTRAVENOUS; SUBCUTANEOUS
Status: DISPENSED | OUTPATIENT
Start: 2019-04-12 | End: 2019-04-12

## 2019-04-12 RX ORDER — LIDOCAINE HYDROCHLORIDE 20 MG/ML
INJECTION, SOLUTION EPIDURAL; INFILTRATION; INTRACAUDAL; PERINEURAL AS NEEDED
Status: DISCONTINUED | OUTPATIENT
Start: 2019-04-12 | End: 2019-04-12 | Stop reason: HOSPADM

## 2019-04-12 RX ORDER — SODIUM CHLORIDE 0.9 % (FLUSH) 0.9 %
5-40 SYRINGE (ML) INJECTION EVERY 8 HOURS
Status: DISCONTINUED | OUTPATIENT
Start: 2019-04-12 | End: 2019-04-16 | Stop reason: HOSPADM

## 2019-04-12 RX ORDER — CELECOXIB 100 MG/1
100 CAPSULE ORAL ONCE
Status: COMPLETED | OUTPATIENT
Start: 2019-04-12 | End: 2019-04-12

## 2019-04-12 RX ADMIN — MIDAZOLAM HYDROCHLORIDE 2 MG: 1 INJECTION, SOLUTION INTRAMUSCULAR; INTRAVENOUS at 13:06

## 2019-04-12 RX ADMIN — HYDROMORPHONE HYDROCHLORIDE 0.5 MG: 1 INJECTION, SOLUTION INTRAMUSCULAR; INTRAVENOUS; SUBCUTANEOUS at 17:23

## 2019-04-12 RX ADMIN — CARVEDILOL 3.12 MG: 3.12 TABLET, FILM COATED ORAL at 20:40

## 2019-04-12 RX ADMIN — EPHEDRINE SULFATE 10 MG: 50 INJECTION, SOLUTION INTRAVENOUS at 13:29

## 2019-04-12 RX ADMIN — FAMOTIDINE 20 MG: 20 TABLET ORAL at 20:42

## 2019-04-12 RX ADMIN — KETAMINE HYDROCHLORIDE 20 MG: 10 INJECTION, SOLUTION INTRAMUSCULAR; INTRAVENOUS at 14:42

## 2019-04-12 RX ADMIN — OXYCODONE HYDROCHLORIDE 20 MG: 20 TABLET, FILM COATED, EXTENDED RELEASE ORAL at 11:56

## 2019-04-12 RX ADMIN — VERAPAMIL HYDROCHLORIDE 240 MG: 240 TABLET, FILM COATED, EXTENDED RELEASE ORAL at 22:32

## 2019-04-12 RX ADMIN — HYDROMORPHONE HYDROCHLORIDE 1 MG: 1 INJECTION, SOLUTION INTRAMUSCULAR; INTRAVENOUS; SUBCUTANEOUS at 16:18

## 2019-04-12 RX ADMIN — FENTANYL CITRATE 50 MCG: 50 INJECTION, SOLUTION INTRAMUSCULAR; INTRAVENOUS at 13:10

## 2019-04-12 RX ADMIN — CELECOXIB 100 MG: 100 CAPSULE ORAL at 11:56

## 2019-04-12 RX ADMIN — ASPIRIN 325 MG: 325 TABLET, COATED ORAL at 20:40

## 2019-04-12 RX ADMIN — SODIUM CHLORIDE, SODIUM LACTATE, POTASSIUM CHLORIDE, AND CALCIUM CHLORIDE: 600; 310; 30; 20 INJECTION, SOLUTION INTRAVENOUS at 15:49

## 2019-04-12 RX ADMIN — ACETAMINOPHEN 975 MG: 325 TABLET ORAL at 11:56

## 2019-04-12 RX ADMIN — DEXAMETHASONE SODIUM PHOSPHATE 8 MG: 4 INJECTION, SOLUTION INTRA-ARTICULAR; INTRALESIONAL; INTRAMUSCULAR; INTRAVENOUS; SOFT TISSUE at 13:22

## 2019-04-12 RX ADMIN — Medication 10 ML: at 22:35

## 2019-04-12 RX ADMIN — SODIUM CHLORIDE 125 ML/HR: 900 INJECTION, SOLUTION INTRAVENOUS at 18:50

## 2019-04-12 RX ADMIN — HYDROMORPHONE HYDROCHLORIDE 0.5 MG: 2 INJECTION, SOLUTION INTRAMUSCULAR; INTRAVENOUS; SUBCUTANEOUS at 13:30

## 2019-04-12 RX ADMIN — SODIUM CHLORIDE, SODIUM LACTATE, POTASSIUM CHLORIDE, AND CALCIUM CHLORIDE 150 ML/HR: 600; 310; 30; 20 INJECTION, SOLUTION INTRAVENOUS at 11:56

## 2019-04-12 RX ADMIN — KETOROLAC TROMETHAMINE 30 MG: 30 INJECTION, SOLUTION INTRAMUSCULAR at 22:33

## 2019-04-12 RX ADMIN — FENTANYL CITRATE 50 MCG: 50 INJECTION, SOLUTION INTRAMUSCULAR; INTRAVENOUS at 14:22

## 2019-04-12 RX ADMIN — GLYCOPYRROLATE 0.1 MG: 0.2 INJECTION INTRAMUSCULAR; INTRAVENOUS at 13:39

## 2019-04-12 RX ADMIN — KETAMINE HYDROCHLORIDE 40 MG: 10 INJECTION, SOLUTION INTRAMUSCULAR; INTRAVENOUS at 14:09

## 2019-04-12 RX ADMIN — GLYCOPYRROLATE 0.1 MG: 0.2 INJECTION INTRAMUSCULAR; INTRAVENOUS at 13:41

## 2019-04-12 RX ADMIN — SENNOSIDES, DOCUSATE SODIUM 1 TABLET: 50; 8.6 TABLET, FILM COATED ORAL at 20:41

## 2019-04-12 RX ADMIN — ONDANSETRON 4 MG: 2 INJECTION INTRAMUSCULAR; INTRAVENOUS at 15:34

## 2019-04-12 RX ADMIN — EPHEDRINE SULFATE 10 MG: 50 INJECTION, SOLUTION INTRAVENOUS at 13:27

## 2019-04-12 RX ADMIN — HYDROMORPHONE HYDROCHLORIDE 0.5 MG: 1 INJECTION, SOLUTION INTRAMUSCULAR; INTRAVENOUS; SUBCUTANEOUS at 16:35

## 2019-04-12 RX ADMIN — KETOROLAC TROMETHAMINE 30 MG: 30 INJECTION, SOLUTION INTRAMUSCULAR; INTRAVENOUS at 15:34

## 2019-04-12 RX ADMIN — HYDROMORPHONE HYDROCHLORIDE 0.5 MG: 2 INJECTION, SOLUTION INTRAMUSCULAR; INTRAVENOUS; SUBCUTANEOUS at 15:02

## 2019-04-12 RX ADMIN — ACETAMINOPHEN 650 MG: 325 TABLET, FILM COATED ORAL at 20:41

## 2019-04-12 RX ADMIN — FENTANYL CITRATE 50 MCG: 50 INJECTION, SOLUTION INTRAMUSCULAR; INTRAVENOUS at 13:06

## 2019-04-12 RX ADMIN — Medication 2 G: at 13:15

## 2019-04-12 RX ADMIN — PROPOFOL 200 MG: 10 INJECTION, EMULSION INTRAVENOUS at 13:10

## 2019-04-12 RX ADMIN — EPHEDRINE SULFATE 10 MG: 50 INJECTION, SOLUTION INTRAVENOUS at 13:52

## 2019-04-12 RX ADMIN — LIDOCAINE HYDROCHLORIDE 60 MG: 20 INJECTION, SOLUTION EPIDURAL; INFILTRATION; INTRACAUDAL; PERINEURAL at 13:10

## 2019-04-12 RX ADMIN — SODIUM CHLORIDE, SODIUM LACTATE, POTASSIUM CHLORIDE, AND CALCIUM CHLORIDE: 600; 310; 30; 20 INJECTION, SOLUTION INTRAVENOUS at 13:47

## 2019-04-12 RX ADMIN — FENTANYL CITRATE 25 MCG: 50 INJECTION, SOLUTION INTRAMUSCULAR; INTRAVENOUS at 15:02

## 2019-04-12 RX ADMIN — DIPHENHYDRAMINE HYDROCHLORIDE 12.5 MG: 12.5 SOLUTION ORAL at 22:32

## 2019-04-12 RX ADMIN — FENTANYL CITRATE 50 MCG: 50 INJECTION, SOLUTION INTRAMUSCULAR; INTRAVENOUS at 13:36

## 2019-04-12 RX ADMIN — Medication 2 G: at 20:40

## 2019-04-12 NOTE — BRIEF OP NOTE
BRIEF OPERATIVE NOTE    Date of Procedure: 4/12/2019   Preoperative Diagnosis: LEFT KNEE OA  Postoperative Diagnosis: LEFT KNEE OA    Procedure(s):  LEFT TOTAL KNEE WITH MAKOPLASTY  Surgeon(s) and Role:     Krunal Tinajero MD - Primary         Surgical Assistant:      Surgical Staff:  Circ-1: Gerardo Doshi RN  Circ-Relief: Thuy Niño RN  Scrub Tech-1: Clydell Stage  Scrub RN-2: Amanda Wagoner  Surg Asst-1: Genevia Flattery  Surg Asst-2: Clemmie Shook  Event Time In Time Out   Incision Start 1335    Incision Close       Anesthesia: General   Estimated Blood Loss: minimal  Specimens: * No specimens in log *   Findings: as above   Complications: none  Implants:   Implant Name Type Inv.  Item Serial No.  Lot No. LRB No. Used Action   BASEPLT TIB PC TRITNM SZ 3 -- TRIATHLON - SNA  BASEPLT TIB PC TRITNM SZ 3 -- TRIATHLON NA [employer] [facility] [id number] Left 1 Implanted   COMPNT FEM CR TRIATHLN 3 L PA --  - SNA  COMPNT FEM CR TRIATHLN 3 L PA --  NA [employer] HOW [id number] Left 1 Implanted   INSERT TIB ARTC BRNG SZ3 13MM -- TRIATHLON - SNA  INSERT TIB ARTC BRNG SZ3 13MM -- TRIATHLON NA [employer] [facility] [id number] Left 1 Implanted   PAT W/PA COATING BEAD [id number] -- TRIATHLON - SNA  PAT W/PA COATING BEAD [id number] -- TRIATHLON NA [employer] HOW [id number] Left 1 Implanted

## 2019-04-12 NOTE — ANESTHESIA POSTPROCEDURE EVALUATION
Procedure(s): LEFT TOTAL KNEE WITH MAKOPLASTY. general 
 
Anesthesia Post Evaluation Multimodal analgesia: multimodal analgesia not used between 6 hours prior to anesthesia start to PACU discharge Patient location during evaluation: PACU Patient participation: complete - patient participated Level of consciousness: responsive to verbal stimuli Pain score: 6 Pain management: satisfactory to patient Airway patency: patent Anesthetic complications: no 
Cardiovascular status: acceptable Respiratory status: acceptable Hydration status: acceptable Post anesthesia nausea and vomiting:  none Vitals Value Taken Time /76 4/12/2019  5:30 PM  
Temp 36.7 °C (98.1 °F) 4/12/2019  4:00 PM  
Pulse 79 4/12/2019  5:32 PM  
Resp 14 4/12/2019  5:32 PM  
SpO2 100 % 4/12/2019  5:32 PM  
Vitals shown include unvalidated device data.

## 2019-04-12 NOTE — PERIOP NOTES
TRANSFER - OUT REPORT:    Verbal report given to  Jonathon Mills RN(name) on [address]  being transferred to  Room 409 (unit) for routine progression of care       Report consisted of patients Situation, Background, Assessment and   Recommendations(SBAR). Information from the following report(s) SBAR was reviewed with the receiving nurse. Lines:   Peripheral IV 04/12/19 (Active)   Site Assessment Clean, dry, & intact 4/12/2019  4:09 PM   Phlebitis Assessment 0 4/12/2019  4:09 PM   Infiltration Assessment 0 4/12/2019  4:09 PM   Dressing Status Clean, dry, & intact 4/12/2019  4:09 PM   Dressing Type Transparent 4/12/2019  4:09 PM   Hub Color/Line Status Pink; Infusing 4/12/2019  4:09 PM   Alcohol Cap Used Yes 4/12/2019  4:09 PM        Opportunity for questions and clarification was provided. Patient transported with:   Registered Nurse Bedside report given to [facility], VSS patient arousable and appears to be much more comfortable. Family in waiting area and will visit in 10 minutes.

## 2019-04-12 NOTE — H&P
PRE-OP HISTORY AND PHYSICAL    Subjective:     Patient is a 64 y.o.  female presented with a history of left knee pain. Onset of symptoms was gradual with gradually worsening course since that time. She is being admitted for surgical management of this condition. The indications for the procedure include left knee end stage osteoarthritis affecting daily activities and unresponsive to conservative treatment. Patient Active Problem List    Diagnosis Date Noted    Severe obesity ([address] 2019    Hypertension      Past Medical History:   Diagnosis Date    Chronic constipation     Hypertension     Obesity, Class II, BMI 35-39.9     Pre-diabetes 2019    A1C- 6.1    PVC (premature ventricular contraction)     States never been to cardio- has had for many years      Past Surgical History:   Procedure Laterality Date    HX  SECTION N/A     x 3    HX COLONOSCOPY      HX TONSILLECTOMY      HX WISDOM TEETH EXTRACTION Bilateral       Prior to Admission medications    Medication Sig Start Date End Date Taking? Authorizing Provider   diclofenac EC (VOLTAREN) 75 mg EC tablet Take 75 mg by mouth two (2) times a day. Yes Provider, Historical   acetaminophen (TYLENOL) 650 mg TbER Take 650 mg by mouth every eight (8) hours. Indications: Pain associated with Arthritis   Yes Provider, Historical   aspirin delayed-release 81 mg tablet Take 81 mg by mouth daily. Yes Provider, Historical   verapamil ER (CALAN-SR) 240 mg CR tablet Take 1 Tab by mouth nightly. 19  Yes Appcarlito Pierce MD   carvedilol (COREG) 3.125 mg tablet Take 1 Tab by mouth two (2) times daily (with meals). 19  Yes Appcarlito Pierce MD   hydroCHLOROthiazide (HYDRODIURIL) 50 mg tablet Take  by mouth daily. Yes Provider, Historical   lisinopril (PRINIVIL, ZESTRIL) 40 mg tablet Take  by mouth every morning. Yes Provider, Historical   COD LIVER OIL, BULK, by Does Not Apply route.    Yes Provider, Historical lidocaine (LIDODERM) 5 % Apply 1 Patch to affected area. 2/12/19 4/13/19  Provider, Historical   cannabidiol, CBD, extract 100 mg/mL soln Take  by mouth. Provider, Historical   OTHER ginkgo biloba    Provider, Historical   OTHER Black seed oil    Provider, Historical   glucosamine/chondr brown A sod (GLUCOSAMINE-CHONDROITIN) 750-600 mg tab Take 2 Tabs by mouth. Provider, Historical     No Known Allergies   Social History     Tobacco Use    Smoking status: Never Smoker    Smokeless tobacco: Never Used   Substance Use Topics    Alcohol use: Never     Frequency: Never      Family History   Problem Relation Age of Onset    Diabetes Mother       Review of Systems  A comprehensive review of systems was negative except for that written in the HPI. Objective:     Patient Vitals for the past 8 hrs:   BP Temp Pulse Resp SpO2 Height Weight   04/12/19 1139 127/72 98.4 °F (36.9 °C) 66 13 100 % -- --   04/12/19 1107 -- -- -- -- -- 5' 4\" (1.626 m) 95.5 kg (210 lb 8.6 oz)     Left knee tender to rom, varus deformity, hip nt, nvi, med joint line tenderness    Imaging Review  Left knee severe osteoarthritis with varus collapse    Assessment:     Active Problems:    * No active hospital problems. *      Plan:     The various methods of treatment have been discussed with the patient and family. After consideration of risks, benefits and other options for treatment, the patient has consented to surgical interventions (left total knee arthroplasty with Jeison). Questions were answered and Pre-op teaching was done by staff.

## 2019-04-12 NOTE — ANESTHESIA PREPROCEDURE EVALUATION
Relevant Problems   No relevant active problems       Anesthetic History   No history of anesthetic complications            Review of Systems / Medical History  Patient summary reviewed, nursing notes reviewed and pertinent labs reviewed    Pulmonary  Within defined limits                 Neuro/Psych   Within defined limits           Cardiovascular    Hypertension: well controlled              Exercise tolerance: >4 METS     GI/Hepatic/Renal  Within defined limits              Endo/Other        Morbid obesity     Other Findings              Physical Exam    Airway  Mallampati: II  TM Distance: 4 - 6 cm  Neck ROM: normal range of motion   Mouth opening: Normal     Cardiovascular    Rhythm: regular  Rate: normal         Dental  No notable dental hx       Pulmonary  Breath sounds clear to auscultation               Abdominal         Other Findings            Anesthetic Plan    ASA: 2  Anesthesia type: general          Induction: Intravenous  Anesthetic plan and risks discussed with: Patient

## 2019-04-13 LAB
ANION GAP SERPL CALC-SCNC: 9 MMOL/L (ref 5–15)
BUN SERPL-MCNC: 15 MG/DL (ref 6–20)
BUN/CREAT SERPL: 24 (ref 12–20)
CALCIUM SERPL-MCNC: 8.4 MG/DL (ref 8.5–10.1)
CHLORIDE SERPL-SCNC: 105 MMOL/L (ref 97–108)
CO2 SERPL-SCNC: 26 MMOL/L (ref 21–32)
CREAT SERPL-MCNC: 0.62 MG/DL (ref 0.55–1.02)
GLUCOSE SERPL-MCNC: 89 MG/DL (ref 65–100)
HGB BLD-MCNC: 10.3 G/DL (ref 11.5–16)
POTASSIUM SERPL-SCNC: 3 MMOL/L (ref 3.5–5.1)
SODIUM SERPL-SCNC: 140 MMOL/L (ref 136–145)

## 2019-04-13 PROCEDURE — 85018 HEMOGLOBIN: CPT

## 2019-04-13 PROCEDURE — 97530 THERAPEUTIC ACTIVITIES: CPT

## 2019-04-13 PROCEDURE — 36415 COLL VENOUS BLD VENIPUNCTURE: CPT

## 2019-04-13 PROCEDURE — 74011250637 HC RX REV CODE- 250/637: Performed by: ORTHOPAEDIC SURGERY

## 2019-04-13 PROCEDURE — 97162 PT EVAL MOD COMPLEX 30 MIN: CPT

## 2019-04-13 PROCEDURE — 80048 BASIC METABOLIC PNL TOTAL CA: CPT

## 2019-04-13 PROCEDURE — 97116 GAIT TRAINING THERAPY: CPT

## 2019-04-13 PROCEDURE — 74011250636 HC RX REV CODE- 250/636: Performed by: ORTHOPAEDIC SURGERY

## 2019-04-13 PROCEDURE — 65270000029 HC RM PRIVATE

## 2019-04-13 RX ORDER — HYDROMORPHONE HYDROCHLORIDE 2 MG/1
2 TABLET ORAL
Status: DISCONTINUED | OUTPATIENT
Start: 2019-04-13 | End: 2019-04-16 | Stop reason: HOSPADM

## 2019-04-13 RX ORDER — HYDROMORPHONE HYDROCHLORIDE 2 MG/1
2-4 TABLET ORAL
Status: DISCONTINUED | OUTPATIENT
Start: 2019-04-13 | End: 2019-04-13

## 2019-04-13 RX ORDER — HYDROMORPHONE HYDROCHLORIDE 2 MG/1
4 TABLET ORAL
Status: DISCONTINUED | OUTPATIENT
Start: 2019-04-13 | End: 2019-04-16 | Stop reason: HOSPADM

## 2019-04-13 RX ORDER — PANTOPRAZOLE SODIUM 20 MG/1
20 TABLET, DELAYED RELEASE ORAL DAILY
Qty: 30 TAB | Refills: 0 | Status: SHIPPED | OUTPATIENT
Start: 2019-04-13 | End: 2020-07-23 | Stop reason: ALTCHOICE

## 2019-04-13 RX ORDER — HYDROMORPHONE HYDROCHLORIDE 2 MG/1
2-4 TABLET ORAL
Qty: 30 TAB | Refills: 0 | Status: SHIPPED | OUTPATIENT
Start: 2019-04-13 | End: 2019-04-16

## 2019-04-13 RX ORDER — ASPIRIN 325 MG
325 TABLET ORAL 2 TIMES DAILY
Qty: 60 TAB | Refills: 0 | Status: SHIPPED | OUTPATIENT
Start: 2019-04-13 | End: 2020-07-23 | Stop reason: ALTCHOICE

## 2019-04-13 RX ADMIN — HYDROMORPHONE HYDROCHLORIDE 2 MG: 2 TABLET ORAL at 10:44

## 2019-04-13 RX ADMIN — HYDROCHLOROTHIAZIDE 50 MG: 25 TABLET ORAL at 08:18

## 2019-04-13 RX ADMIN — LISINOPRIL 40 MG: 20 TABLET ORAL at 08:18

## 2019-04-13 RX ADMIN — ACETAMINOPHEN 650 MG: 325 TABLET, FILM COATED ORAL at 15:15

## 2019-04-13 RX ADMIN — HYDROMORPHONE HYDROCHLORIDE 4 MG: 2 TABLET ORAL at 21:28

## 2019-04-13 RX ADMIN — ACETAMINOPHEN 650 MG: 325 TABLET, FILM COATED ORAL at 02:37

## 2019-04-13 RX ADMIN — CARVEDILOL 3.12 MG: 3.12 TABLET, FILM COATED ORAL at 08:18

## 2019-04-13 RX ADMIN — HYDROMORPHONE HYDROCHLORIDE 2 MG: 2 TABLET ORAL at 17:11

## 2019-04-13 RX ADMIN — ACETAMINOPHEN 650 MG: 325 TABLET, FILM COATED ORAL at 20:42

## 2019-04-13 RX ADMIN — POLYETHYLENE GLYCOL 3350 17 G: 17 POWDER, FOR SOLUTION ORAL at 08:19

## 2019-04-13 RX ADMIN — CARVEDILOL 3.12 MG: 3.12 TABLET, FILM COATED ORAL at 16:40

## 2019-04-13 RX ADMIN — DEXAMETHASONE SODIUM PHOSPHATE 10 MG: 10 INJECTION, SOLUTION INTRAMUSCULAR; INTRAVENOUS at 12:58

## 2019-04-13 RX ADMIN — KETOROLAC TROMETHAMINE 30 MG: 30 INJECTION, SOLUTION INTRAMUSCULAR at 16:41

## 2019-04-13 RX ADMIN — VERAPAMIL HYDROCHLORIDE 240 MG: 240 TABLET, FILM COATED, EXTENDED RELEASE ORAL at 21:28

## 2019-04-13 RX ADMIN — OXYCODONE HYDROCHLORIDE 10 MG: 5 TABLET ORAL at 04:08

## 2019-04-13 RX ADMIN — Medication 2 G: at 04:07

## 2019-04-13 RX ADMIN — Medication 10 ML: at 21:29

## 2019-04-13 RX ADMIN — FAMOTIDINE 20 MG: 20 TABLET ORAL at 08:18

## 2019-04-13 RX ADMIN — OXYCODONE HYDROCHLORIDE 10 MG: 5 TABLET ORAL at 08:18

## 2019-04-13 RX ADMIN — HYDROMORPHONE HYDROCHLORIDE 2 MG: 2 TABLET ORAL at 15:15

## 2019-04-13 RX ADMIN — KETOROLAC TROMETHAMINE 30 MG: 30 INJECTION, SOLUTION INTRAMUSCULAR at 10:20

## 2019-04-13 RX ADMIN — ACETAMINOPHEN 650 MG: 325 TABLET, FILM COATED ORAL at 08:18

## 2019-04-13 RX ADMIN — KETOROLAC TROMETHAMINE 30 MG: 30 INJECTION, SOLUTION INTRAMUSCULAR at 04:08

## 2019-04-13 RX ADMIN — OXYCODONE HYDROCHLORIDE 5 MG: 5 TABLET ORAL at 01:02

## 2019-04-13 RX ADMIN — Medication 2 G: at 12:58

## 2019-04-13 RX ADMIN — SENNOSIDES, DOCUSATE SODIUM 1 TABLET: 50; 8.6 TABLET, FILM COATED ORAL at 08:18

## 2019-04-13 RX ADMIN — ASPIRIN 325 MG: 325 TABLET, COATED ORAL at 16:39

## 2019-04-13 RX ADMIN — SENNOSIDES, DOCUSATE SODIUM 1 TABLET: 50; 8.6 TABLET, FILM COATED ORAL at 16:40

## 2019-04-13 RX ADMIN — SODIUM CHLORIDE 125 ML/HR: 900 INJECTION, SOLUTION INTRAVENOUS at 01:05

## 2019-04-13 RX ADMIN — HYDROMORPHONE HYDROCHLORIDE 2 MG: 2 TABLET ORAL at 11:57

## 2019-04-13 RX ADMIN — DIPHENHYDRAMINE HYDROCHLORIDE 12.5 MG: 12.5 SOLUTION ORAL at 23:29

## 2019-04-13 RX ADMIN — Medication 10 ML: at 12:59

## 2019-04-13 RX ADMIN — ASPIRIN 325 MG: 325 TABLET, COATED ORAL at 08:18

## 2019-04-13 NOTE — PROGRESS NOTES
Orthopedic Joint Progress Note    2019  Admit Date: 2019  Admit Diagnosis: Osteoarthritis of right knee, unspecified osteoarthritis type [M17.11]    1 Day Post-Op    Subjective:     Karina Booth describes moderate pain poorly controlled with oxycodone    Review of Systems: A review of systems was negative. Objective:     PT/OT:     PATIENT MOBILITY                           Vital Signs:    Blood pressure 144/73, pulse 71, temperature 97.9 °F (36.6 °C), resp. rate 18, height 5' 4\" (1.626 m), weight 95.5 kg (210 lb 8.6 oz), SpO2 97 %.   Temp (24hrs), Av °F (36.7 °C), Min:97.5 °F (36.4 °C), Max:98.4 °F (36.9 °C)      Pain Control:   Pain Assessment  Pain Scale 1: Numeric (0 - 10)  Pain Intensity 1: 8  Pain Location 1: Knee, Leg, Hip, Back  Pain Orientation 1: Left  Pain Description 1: Aching, Sore  Pain Intervention(s) 1: Medication (see MAR)    Meds:  Current Facility-Administered Medications   Medication Dose Route Frequency    carvedilol (COREG) tablet 3.125 mg  3.125 mg Oral BID WITH MEALS    hydroCHLOROthiazide (HYDRODIURIL) tablet 50 mg  50 mg Oral DAILY    lisinopril (PRINIVIL, ZESTRIL) tablet 40 mg  40 mg Oral DAILY    verapamil ER (CALAN-SR) tablet 240 mg  240 mg Oral QHS    0.9% sodium chloride infusion  125 mL/hr IntraVENous CONTINUOUS    sodium chloride 0.9 % bolus infusion 500 mL  500 mL IntraVENous ONCE PRN    sodium chloride (NS) flush 5-40 mL  5-40 mL IntraVENous Q8H    sodium chloride (NS) flush 5-40 mL  5-40 mL IntraVENous PRN    acetaminophen (TYLENOL) tablet 650 mg  650 mg Oral Q6H    oxyCODONE IR (ROXICODONE) tablet 5 mg  5 mg Oral Q3H PRN    oxyCODONE IR (ROXICODONE) tablet 10 mg  10 mg Oral Q3H PRN    naloxone (NARCAN) injection 0.4 mg  0.4 mg IntraVENous PRN    ondansetron (ZOFRAN) injection 4 mg  4 mg IntraVENous Q4H PRN    dexamethasone (PF) (DECADRON) injection 10 mg  10 mg IntraVENous ONCE    [START ON 2019] ondansetron (ZOFRAN ODT) tablet 4 mg  4 mg Oral Q6H PRN    diphenhydrAMINE (BENADRYL) 12.5 mg/5 mL oral elixir 12.5 mg  12.5 mg Oral Q6H PRN    famotidine (PEPCID) tablet 20 mg  20 mg Oral BID    senna-docusate (PERICOLACE) 8.6-50 mg per tablet 1 Tab  1 Tab Oral BID    polyethylene glycol (MIRALAX) packet 17 g  17 g Oral DAILY    [START ON 4/14/2019] bisacodyl (DULCOLAX) suppository 10 mg  10 mg Rectal DAILY PRN    ketorolac (TORADOL) injection 30 mg  30 mg IntraVENous Q6H    morphine injection 2 mg  2 mg IntraVENous Q3H PRN    ceFAZolin (ANCEF) 2 g/20 mL in sterile water IV syringe  2 g IntraVENous Q8H    aspirin delayed-release tablet 325 mg  325 mg Oral BID        LAB:    Lab Results   Component Value Date/Time    INR 1.0 03/27/2019 03:10 PM     Lab Results   Component Value Date/Time    HGB 10.3 (L) 04/13/2019 07:24 AM    HGB 12.2 03/27/2019 03:10 PM       Wound Knee Left (Active)   Dressing Status Clean, dry, and intact 4/13/2019  4:09 AM   Dressing Type Elastic bandage 4/13/2019  4:09 AM   Incision Site Well Approximated Yes 4/12/2019  3:00 PM   Drainage Amount None 4/13/2019  4:09 AM   Wound Odor None 4/13/2019  4:09 AM   Number of days: 1         Physical Exam:  Musculoskeletal: negative  incision c/d/i, calf soft/nt    Assessment:      Active Problems:    Osteoarthritis of right knee (4/12/2019)         Plan:     Continue PT/OT/Rehab  Consult: Rehab team including PT, OT, recreational therapy, and     [facility]     Plan on discharge home today after therapy assuming she does well and pain better managed with Dilaudid. If not Call me at [phone] and will hold discharge until tomorrow.

## 2019-04-13 NOTE — PROGRESS NOTES
Problem: Mobility Impaired (Adult and Pediatric)  Goal: *Therapy Goal (Edit Goal, Insert Text)  Outcome: Progressing Towards Goal  Initiated 4/13/2019    1. Patient will move from supine to sit and sit to supine in bed with supervision/set-up within 4 days. 2. Patient will perform sit to stand with minimal assistance/contact guard assist within 4 days. 3. Patient will ambulate with supervision/set-up for 50 feet with the least restrictive device within 4 days. 4. Patient will ascend/descend 3 stairs with left handrail(s) with minimal assistance/contact guard assist within 4 days. 5. Patient will perform home exercise program per protocol with minimal assistance/contact guard assist within 4 days. 6. Patient will demonstrate AROM 0-90 degrees in operative joint within 4 days. Note:   PHYSICAL THERAPY TREATMENT  Patient: Kate Rollins (89 y.o. female)  Date: 4/13/2019  Diagnosis: Osteoarthritis of right knee, unspecified osteoarthritis type [M17.11] <principal problem not specified>  Procedure(s) (LRB):  LEFT TOTAL KNEE WITH MAKOPLASTY (Left) 1 Day Post-Op  Precautions: Fall  Chart, physical therapy assessment, plan of care and goals were reviewed. ASSESSMENT:  Pt received in bed, several family members at bedside (6) and asked to leave room during session. Pt reports 7/10 pain 45 min after receiving pain medications. Limited DF on LLE in supine Mod A to come to sitting EOB with increased time. Decreased tolerance for knee flexion. Mod A x2 for sit<>stand using RW. Gait training x 15' using RW with Min A x2 with constant verbal cues for sequencing. Pt demonstrates difficultly advancing LLE, no knee bucking noted. Pt returned to chair pt tearful. Performed seated knee flexion 20 degrees. Pt is not ready to attempt stair training at this time. Agreeable to sit in chair, for one hour. RN notified  Progression toward goals:  ?      Improving appropriately and progressing toward goals  ? Improving slowly and progressing toward goals  ? Not making progress toward goals and plan of care will be adjusted     PLAN:  Patient continues to benefit from skilled intervention to address the above impairments. Continue treatment per established plan of care. Discharge Recommendations:  Home Health  Further Equipment Recommendations for Discharge:  none      SUBJECTIVE:   Patient stated Christiano Hall thought this would be better.     OBJECTIVE DATA SUMMARY:   Critical Behavior:  Neurologic State: Alert  Orientation Level: Oriented X4  Cognition: Appropriate decision making  Safety/Judgement: Awareness of environment  Range of Motion:  AROM: Generally decreased, functional  PROM: Generally decreased, functional              LLE AROM  L Knee Flexion: 20  L Knee Extension: 0     Functional Mobility Training:  Bed Mobility:  Rolling: Moderate assistance  Supine to Sit: Minimum assistance; Additional time  Sit to Supine: Maximum assistance(for LLE)  Scooting: Minimum assistance; Additional time  Level of Assistance: Maximum assistance  Interventions: Demonstration;Manual cues; Safety awareness training;Verbal cues  Transfers:  Sit to Stand: Moderate assistance;Assist x2  Stand to Sit: Minimum assistance; Additional time;Assist x2     Stand Pivot Transfers: Moderate assistance(using RW)  Bed to Chair: Moderate assistance(using RW)              Interventions: Manual cues; Safety awareness training; Tactile cues; Verbal cues  Level of Assistance: Maximum assistance  Balance:  Sitting: Intact; With support  Sitting - Static: Fair (occasional)  Sitting - Dynamic: Fair (occasional)  Standing: Impaired; With support  Standing - Static: Constant support; Fair  Standing - Dynamic : Fair  Ambulation/Gait Training:  Distance (ft): 15 Feet (ft)  Assistive Device: Walker, rolling;Gait belt  Ambulation - Level of Assistance: Minimal assistance;Assist x2; Additional time     Gait Description (WDL): Exceptions to WDL  Gait Abnormalities: Decreased step clearance; Step to gait; Path deviations     Left Side Weight Bearing: As tolerated  Base of Support: Widened     Speed/Elisa: Delayed; Slow  Step Length: Left lengthened;Left shortened                    Stairs: Therapeutic Exercises:     EXERCISE   Sets   Reps   Active Active Assist   Passive Self ROM   Comments   Ankle Pumps  20 ?                                        ?                                        ?                                        ?                                           Quad Sets  10 ?                                        ?                                        ?                                        ?                                           Hamstring Sets   ? ?                                        ?                                        ?                                           Short Arc Quads  10 ?                                        ?                                        ?                                        ?                                           Knee Extension Stretch     ? ?                                          ?                                          ?                                           Heel Slides  10 ?                                        ?                                        ?                                        ?                                           Long Arc Quads   ? ?                                        ?                                        ?                                           Knee Flexion Stretch   ?                                         ?                                        ?                                        ?                                           Straight Leg Raises  5 ?                                        ?                                        ? ?                                             Pain:  Pain Scale 1: Numeric (0 - 10)  Pain Intensity 1: 6  Pain Location 1: Knee;Leg;Hip  Pain Orientation 1: Left  Pain Description 1: Aching; Sore  Pain Intervention(s) 1: Medication (see MAR)  Activity Tolerance:   fair  Please refer to the flowsheet for vital signs taken during this treatment. After treatment:   ? Patient left in no apparent distress sitting up in chair  ? Patient left in no apparent distress in bed  ? Call bell left within reach  ? Nursing notified  ? Caregiver present  ?  Bed alarm activated    COMMUNICATION/COLLABORATION:   The patients plan of care was discussed with: Registered Nurse    Armando Fernandez   Time Calculation: 30 mins

## 2019-04-13 NOTE — DISCHARGE INSTRUCTIONS
[address]  Surgery: Total Knee Replacement  Surgeon:   Ernie Hutchinson MD  Surgery Date:  4/12/2019     To relieve pain:   Use ice/gel packs.  Put the ice pack directly over the wound, or anywhere you are hurting or swollen.  To control pain and swelling, keep ice on regularly, especially after physical activity.  The packs should stay cold for 3-4 hours. When it is not cold anymore, rotate with the packs in the freezer.  Elevate your leg. This will also keep swelling down.  Rest for at least 20 minutes between activity or exercises.  To keep track of your pain medications, write down what you take and when you take it.  The last dose of pain medication you got in the hospital was:       Medication    Dose    Date & Time         Choose your medications based on the pain scale below:     To keep your pain under control, take Tylenol every 6 hours for 14 days - even if you feel like you dont need it.  For mild to moderate pain (4-6 on pain scale), take one pain pill every 3-4 hours as needed.  For severe pain (7-10 on pain scale), take two pain pills every 3-4 hours as needed.  To prevent nausea, take your pain medications with food. Pain Scale                 As your pain lessens:     Slowly start taking less pain medication. You may do this by waiting longer between doses or by taking smaller doses.  Stop using the pain medications as soon as you no longer need it, usually in 2-3 weeks.  Aspirin   To prevent blood clots, you will need to take Aspirin 325 mg twice a day for 30 days.  To prevent stomach upset or bleeding:   Do not take non-steroidal anti-inflammatory medications (Ibuprofen, Advil, Motrin, Naproxen, etc.)    Take Pepcid 20 mg twice a day, or a similar home medication, while you are taking a blood thinner.             OPSITE (Honeycomb dressing)     Keep your clear, waterproof dressing in place for 5-7 days after your leave the hospital.     If you are still having drainage, you will need to change your dressing in 5-7 days. You will be given one extra dressing to use at home.  If there is no more drainage from the wound, you may leave it open to the air. OPSITE DRESSING INSTRUCTIONS                  PRINEO DRESSING INSTRUCTIONS      Prineo is a type of skin glue and mesh that is keeping your wound together.  Leave this covering alone. Do not peel it off.  Do not apply oils or lotions over it.  You may shower with this dressing but do not soak it. Gently pat your wound dry when you get out of the shower.  DO NOTs:   Do not rub your surgical wound   Do not put lotion or oils on your wound.  Do not take a tub bath or go swimming until your doctor says it is ok.  You may shower with this dressing over your wound.  After showering pat the dressing dry.  If you have staples a home health nurse will remove them in about 10 days.  To increase and promote healing:   Stop Smoking (or at least cut back on       Smoking).  Eat a well-balanced diet (high in protein       and vitamin C).  If you have a poor appetite, drink Ensure, Glucerna, or         [place] Instant Breakfast for the next 30 days.  If you are diabetic, you should control your blood         sugars to prevent infection and help your wound         to heal.                         To prevent constipation, stay active and drink plenty of fluid.  While using pain medications, you should also take stool softeners and laxatives, such as Pericolace       and Miralax.  If you are having too many bowel       Movements, then you may need       to stop taking the laxatives.      You should have a bowel movement 3-4 days        after surgery and then at least every other day while       on pain medication.  To improve your recovery, you must be active!  Use your walker and take short walks (in your home)         about every 2 hours during the day.  Try to increase how far you walk each day.  You can put as much weight on your leg as you can tolerate while walking.  To avoid getting a stiff knee, work on getting your knee bent and straight as soon as possible.  Home health physical therapy will come to your       home the day after you leave the hospital.  The       therapist will visit about 4 times over the next        couple of weeks to teach you how to get out of        bed, to safely walk in your home, and to do your        exercises.  NO DRIVING until your surgeon tells you it is ok.  You can return to work when cleared by a physician.  Please call your physician immediately if you have:     Constant bleeding from your wound.  Increasing redness or swelling around your wound (Some warmth, bruising and swelling is normal).  Change in wound drainage (increase in amount, color, or bad smell).  Change in mental status (unusual behavior   Temperature over 101.5 degrees Fahrenheit      Pain or redness in the calf (back of your lower leg - see picture)     Increased swelling of the thigh, ankle, calf, or foot.  Emergency: CALL 911 if you have:     Shortness of breath     Chest pain when you cough or taking a deep breath     Please call your surgeons office at [phone]  for a follow up appointment. _   You should call as soon as you get home or the next day after discharge. Ask to make an appointment in 2 weeks.  If you have questions or concerns during normal business hours, you may reach Dr. Emilio Cole' Team at [phone].
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
## 2019-04-14 LAB
ANION GAP SERPL CALC-SCNC: 5 MMOL/L (ref 5–15)
BUN SERPL-MCNC: 15 MG/DL (ref 6–20)
BUN/CREAT SERPL: 21 (ref 12–20)
CALCIUM SERPL-MCNC: 8.7 MG/DL (ref 8.5–10.1)
CHLORIDE SERPL-SCNC: 105 MMOL/L (ref 97–108)
CO2 SERPL-SCNC: 29 MMOL/L (ref 21–32)
CREAT SERPL-MCNC: 0.72 MG/DL (ref 0.55–1.02)
GLUCOSE SERPL-MCNC: 125 MG/DL (ref 65–100)
HGB BLD-MCNC: 10.2 G/DL (ref 11.5–16)
POTASSIUM SERPL-SCNC: 3.2 MMOL/L (ref 3.5–5.1)
SODIUM SERPL-SCNC: 139 MMOL/L (ref 136–145)

## 2019-04-14 PROCEDURE — 74011250636 HC RX REV CODE- 250/636: Performed by: PHYSICIAN ASSISTANT

## 2019-04-14 PROCEDURE — 94760 N-INVAS EAR/PLS OXIMETRY 1: CPT

## 2019-04-14 PROCEDURE — 65270000029 HC RM PRIVATE

## 2019-04-14 PROCEDURE — 74011250637 HC RX REV CODE- 250/637: Performed by: ORTHOPAEDIC SURGERY

## 2019-04-14 PROCEDURE — 97116 GAIT TRAINING THERAPY: CPT

## 2019-04-14 PROCEDURE — 85018 HEMOGLOBIN: CPT

## 2019-04-14 PROCEDURE — 97530 THERAPEUTIC ACTIVITIES: CPT

## 2019-04-14 PROCEDURE — 36415 COLL VENOUS BLD VENIPUNCTURE: CPT

## 2019-04-14 PROCEDURE — 80048 BASIC METABOLIC PNL TOTAL CA: CPT

## 2019-04-14 RX ORDER — HYDROMORPHONE HYDROCHLORIDE 2 MG/ML
1 INJECTION, SOLUTION INTRAMUSCULAR; INTRAVENOUS; SUBCUTANEOUS
Status: DISCONTINUED | OUTPATIENT
Start: 2019-04-14 | End: 2019-04-16 | Stop reason: HOSPADM

## 2019-04-14 RX ORDER — HYDROMORPHONE HYDROCHLORIDE 2 MG/ML
0.5 INJECTION, SOLUTION INTRAMUSCULAR; INTRAVENOUS; SUBCUTANEOUS
Status: DISCONTINUED | OUTPATIENT
Start: 2019-04-14 | End: 2019-04-16 | Stop reason: HOSPADM

## 2019-04-14 RX ADMIN — ACETAMINOPHEN 650 MG: 325 TABLET, FILM COATED ORAL at 20:32

## 2019-04-14 RX ADMIN — POLYETHYLENE GLYCOL 3350 17 G: 17 POWDER, FOR SOLUTION ORAL at 07:57

## 2019-04-14 RX ADMIN — FAMOTIDINE 20 MG: 20 TABLET ORAL at 17:00

## 2019-04-14 RX ADMIN — HYDROMORPHONE HYDROCHLORIDE 4 MG: 2 TABLET ORAL at 12:05

## 2019-04-14 RX ADMIN — DIPHENHYDRAMINE HYDROCHLORIDE 12.5 MG: 12.5 SOLUTION ORAL at 23:02

## 2019-04-14 RX ADMIN — CARVEDILOL 3.12 MG: 3.12 TABLET, FILM COATED ORAL at 16:17

## 2019-04-14 RX ADMIN — VERAPAMIL HYDROCHLORIDE 240 MG: 240 TABLET, FILM COATED, EXTENDED RELEASE ORAL at 22:28

## 2019-04-14 RX ADMIN — HYDROMORPHONE HYDROCHLORIDE 4 MG: 2 TABLET ORAL at 07:55

## 2019-04-14 RX ADMIN — SENNOSIDES, DOCUSATE SODIUM 1 TABLET: 50; 8.6 TABLET, FILM COATED ORAL at 07:56

## 2019-04-14 RX ADMIN — SENNOSIDES, DOCUSATE SODIUM 1 TABLET: 50; 8.6 TABLET, FILM COATED ORAL at 17:00

## 2019-04-14 RX ADMIN — FAMOTIDINE 20 MG: 20 TABLET ORAL at 07:56

## 2019-04-14 RX ADMIN — Medication 10 ML: at 06:00

## 2019-04-14 RX ADMIN — Medication 10 ML: at 22:28

## 2019-04-14 RX ADMIN — ACETAMINOPHEN 650 MG: 325 TABLET, FILM COATED ORAL at 07:56

## 2019-04-14 RX ADMIN — ASPIRIN 325 MG: 325 TABLET, COATED ORAL at 07:55

## 2019-04-14 RX ADMIN — HYDROMORPHONE HYDROCHLORIDE 0.5 MG: 2 INJECTION INTRAMUSCULAR; INTRAVENOUS; SUBCUTANEOUS at 14:08

## 2019-04-14 RX ADMIN — Medication 10 ML: at 16:18

## 2019-04-14 RX ADMIN — HYDROMORPHONE HYDROCHLORIDE 4 MG: 2 TABLET ORAL at 18:49

## 2019-04-14 RX ADMIN — LISINOPRIL 40 MG: 20 TABLET ORAL at 07:55

## 2019-04-14 RX ADMIN — HYDROMORPHONE HYDROCHLORIDE 4 MG: 2 TABLET ORAL at 23:02

## 2019-04-14 RX ADMIN — HYDROCHLOROTHIAZIDE 50 MG: 25 TABLET ORAL at 07:56

## 2019-04-14 RX ADMIN — HYDROMORPHONE HYDROCHLORIDE 4 MG: 2 TABLET ORAL at 03:40

## 2019-04-14 RX ADMIN — ACETAMINOPHEN 650 MG: 325 TABLET, FILM COATED ORAL at 14:07

## 2019-04-14 RX ADMIN — ASPIRIN 325 MG: 325 TABLET, COATED ORAL at 17:00

## 2019-04-14 RX ADMIN — ACETAMINOPHEN 650 MG: 325 TABLET, FILM COATED ORAL at 03:40

## 2019-04-14 RX ADMIN — CARVEDILOL 3.12 MG: 3.12 TABLET, FILM COATED ORAL at 07:56

## 2019-04-14 NOTE — PROGRESS NOTES
4/14/2019  2:52 PM    Reason for Admission:   Left Knee OA                   RRAT Score:   5                  Plan for utilizing home health: The plan is for the pt to utilize home health services. Current Advanced Directive/Advance Care Plan:  No plan on file    Likelihood of Readmission:  Low                         Transition of Care Plan:      CM met with pt to assess, confirmed demographics. Pt resides in 2 story home with spouse, daughter, and sister-in-law; 3 stairs into entrance, pt moving into downstairs bedroom. Pt has no hx of HH or rehab. Pt has DME cane and bedside commode. Pt has no issues getting scripts, patient uses [employer] and [employer]. The pt has good family support and her spouse will transport her at discharge. The patient has advised she requires a bathtub transfer bench with a back at discharge. Per PT consult the DME recommendations are transfer bench and rolling walker. CM discussed HH options, pt chose [facility], choice letter signed and placed in hard chart. CM awaiting specific instructions/orders from provider before sending [facility] and DME referrals. ERA Horner    Care Management Interventions  PCP Verified by CM:  Yes  Last Visit to PCP: 04/09/19  Transition of Care Consult (CM Consult): Discharge Planning, [address]: Yes  Physical Therapy Consult: Yes  Current Support Network: Lives with Spouse  Confirm Follow Up Transport: Family  Plan discussed with Pt/Family/Caregiver: Yes  Freedom of Choice Offered: (S) Yes

## 2019-04-14 NOTE — PROGRESS NOTES
PHYSICAL THERAPY TREATMENT  Patient: Trinity Arce (89 y.o. female)  Date: 4/14/2019  Diagnosis: Osteoarthritis of right knee, unspecified osteoarthritis type [M17.11] <principal problem not specified>  Procedure(s) (LRB):  LEFT TOTAL KNEE WITH MAKOPLASTY (Left) 2 Days Post-Op  Precautions: Fall  Chart, physical therapy assessment, plan of care and goals were reviewed. ASSESSMENT: Patient agrees to getting up with request to use bathroom. Patient reports 5/10 knee pain which is improved since yesterday. Supine to sit min A for LE. Sit to stand min/mod A x 1 with bed height elevated. Patient ambulates to bathroom with very slow gait speed and hip hike with vaulting off R to advance L LE.  PT spent time on gait mechanics specifically knee flexion during L swing phase to reduce vaulting and hip hike. Patient required multiple verbal and tactile cuing with some carryover by the end. Stand to sit and sit to stand off toilet with BSC frame min A ([employer]). Able to ambulate additional 20' for a total of 25' however very slow with RW min A x 1. Patient with slow processing at times. Returned to bedside  chair. Performed seated heel slides with AAROM for knee flexion x 10 reps. Encouraged family to have her perform this task more during down time. Daughter asking if patient to try stairs this pm and PT encouraged family to have her mom perform above exercise to aid knee flexion in order to perform stair training this pm.  Depending upon afternoon performance may need additional night's stay to advance her functional mobility. Patient may benefit from [address] after discharge. Progression toward goals:  ?      Improving appropriately and progressing toward goals  ? Improving slowly and progressing toward goals  ? Not making progress toward goals and plan of care will be adjusted     PLAN:  Patient continues to benefit from skilled intervention to address the above impairments.   Continue treatment per established plan of care. Discharge Recommendations:  Per ortho MD  Further Equipment Recommendations for Discharge:  rolling walker     SUBJECTIVE:   Patient stated ? I want to bend my knee the right way    OBJECTIVE DATA SUMMARY:   Critical Behavior:  Neurologic State: Alert, Appropriate for age  Orientation Level: Oriented X4  Cognition: Appropriate decision making, Appropriate for age attention/concentration, Appropriate safety awareness, Follows commands  Safety/Judgement: Awareness of environment  Range of Motion: NT this am                          Functional Mobility Training:  Bed Mobility:     Supine to Sit: Minimum assistance(LE)     Scooting: Minimum assistance(LE)        Transfers:  Sit to Stand: Moderate assistance(bed elevated)  Stand to Sit: Minimum assistance;Assist x2                             Balance:  Sitting: Intact  Sitting - Static: Fair (occasional)  Standing: Impaired; With support  Standing - Static: Constant support  Ambulation/Gait Training:  Distance (ft): 25 Feet (ft)  Assistive Device: Walker, rolling  Ambulation - Level of Assistance: Minimal assistance;Assist x1        Gait Abnormalities: Altered arm swing;Decreased step clearance;Hip Hike;Step to gait     Left Side Weight Bearing: As tolerated  Base of Support: Widened     Speed/Elisa: Delayed; Slow  Step Length: Right shortened;Left shortened                   Stairs:NT this am           Therapeutic Exercises:     EXERCISE   Sets   Reps   Active Active Assist   Passive Self ROM   Comments   Ankle Pumps   ? ?                                        ?                                        ?                                           Quad Sets   ? ?                                        ?                                        ?                                           Hamstring Sets   ? ? ?                                        ?                                           Short Arc Quads   ? ?                                        ?                                        ?                                           Knee Extension Stretch     ? ?                                          ?                                          ?                                           Heel Slides 1 10 ?                                        ?                                        ?                                        ?                                        Seated in chair   [address]   ? ?                                        ?                                        ?                                           Knee Flexion Stretch   ? ?                                        ?                                        ?                                           Straight Leg Raises   ? ?                                        ?                                        ?                                             Pain:  Pain Scale 1: Numeric (0 - 10)(medicate prior to PT)  Pain Intensity 1: 3  Pain Location 1: Knee;Leg  Pain Orientation 1: Posterior  Pain Description 1: Aching  Pain Intervention(s) 1: Medication (see MAR)  Activity Tolerance:   See above  Please refer to the flowsheet for vital signs taken during this treatment. After treatment:   ? Patient left in no apparent distress sitting up in chair  ? Patient left in no apparent distress in bed  ? Call bell left within reach  ? Nursing notified  ? Caregiver present  ?  Bed alarm activated    COMMUNICATION/COLLABORATION:   The patient?s plan of care was discussed with: Registered Nurse    Rebecca Pyle DPT   Time Calculation: 28 mins

## 2019-04-14 NOTE — PROGRESS NOTES
Problem: Mobility Impaired (Adult and Pediatric)  Goal: *Therapy Goal (Edit Goal, Insert Text)  Description  Physical Therapy Goals  Initiated 4/13/2019    1. Patient will move from supine to sit and sit to supine  in bed with supervision/set-up within 4 days. 2. Patient will perform sit to stand with minimal assistance/contact guard assist within 4 days. 3. Patient will ambulate with supervision/set-up for 50 feet with the least restrictive device within 4 days. 4. Patient will ascend/descend 3 stairs with left handrail(s) with minimal assistance/contact guard assist within 4 days. 5. Patient will perform home exercise program per protocol with minimal assistance/contact guard assist within 4 days. 6. Patient will demonstrate AROM 0-90 degrees in operative joint within 4 days. Outcome: Progressing Towards Goal   PHYSICAL THERAPY TREATMENT  Patient: Keo Francois (13 y.o. female)  Date: 4/14/2019  Diagnosis: Osteoarthritis of right knee, unspecified osteoarthritis type [M17.11] <principal problem not specified>  Procedure(s) (LRB):  LEFT TOTAL KNEE WITH MAKOPLASTY (Left) 2 Days Post-Op  Precautions: Fall  Chart, physical therapy assessment, plan of care and goals were reviewed. ASSESSMENT:  Patient received IV Dilaudid prior to therapy due to 8+/10 pain. At time of session reduced to 7/10 pain. Patient agrees to get up. Very supportive family in attendance (5). Patient bed mobility requires min/modA for LE on/off bed; transfers min /modA x 1-2; ambulates min A x 1 35' with RW step to, slow delayed response. Less vaulting but still has some left hip hike to advance LE. Encouraged increased speed of gait this pm.  WC transfer to gym for stair training with spouse and family present. Spouse assisted on stairs. Able to ascend 4 steps with R rail and HHA from spouse on L with mod A. She needs to circumduct R LE and is unable to flex to clear toes. Additional time required.   Patient unable to descend in forward position with R rail and SPC and side stepping technique required. She was mod A for foot placement and lowering slow and safely. Patient with c/o nausea and increased warmth. Returned to room via JENIFFER Aber[address]athy 23. VSS post treatment. She was assisted back to bed with LE prop under calf with explicit instructions to keep pillow out from under knee to discourage knee flexion. Patients L knee AROM -2 to 40 degrees. Patient very drowsy post activity. PT notified nursing patient is not appropriate to go home at this time as she has great difficulty with stairs and still with general mobility with high pain levels. Recommending another therapy session in the am to re-assess her ability to return home. Progression toward goals:  ?    Improving appropriately and progressing toward goals  ? Improving slowly and progressing toward goals  ? Not making progress toward goals and plan of care will be adjusted     PLAN:  Patient continues to benefit from skilled intervention to address the above impairments. Continue treatment per established plan of care. Discharge Recommendations:  Home Health  Further Equipment Recommendations for Discharge:  RW      SUBJECTIVE:   Patient stated ? My knee feels a bit better after the medicine    OBJECTIVE DATA SUMMARY:   Critical Behavior:  Neurologic State: Alert, Appropriate for age  Orientation Level: Oriented X4  Cognition: Appropriate decision making, Appropriate for age attention/concentration, Appropriate safety awareness, Follows commands  Safety/Judgement: Awareness of environment  Functional Mobility Training:  Bed Mobility:     Supine to Sit: Minimum assistance(LE)  Sit to Supine: Moderate assistance(LE)  Scooting: Minimum assistance(LE)        Transfers:  Sit to Stand: Minimum assistance;Assist x2  Stand to Sit: Minimum assistance; Moderate assistance;Assist x2                             Balance:  Sitting: Intact  Sitting - Static: Good (unsupported)  Standing: Impaired; With support  Standing - Static: Constant support  Ambulation/Gait Training:  Distance (ft): 35 Feet (ft)  Assistive Device: Walker, rolling  Ambulation - Level of Assistance: Minimal assistance;Assist x1        Gait Abnormalities: Altered arm swing; Step to gait;Hip Hike     Left Side Weight Bearing: As tolerated  Base of Support: Widened     Speed/Elisa: Delayed; Slow  Step Length: Right shortened;Left shortened                   Stairs:  Number of Stairs Trained: 4  Stairs - Level of Assistance: Assist X2;Maximum assistance; Additional time          Pain:  Pain Scale 1: Numeric (0 - 10)  Pain Intensity 1: 8  Pain Location 1: Knee  Pain Orientation 1: Posterior  Pain Description 1: Aching  Pain Intervention(s) 1: Medication (see MAR)  Activity Tolerance:   See above  Please refer to the flowsheet for vital signs taken during this treatment. After treatment:   ?    Patient left in no apparent distress sitting up in chair  ? Patient left in no apparent distress in bed  ? Call bell left within reach  ? Nursing notified  ? Caregiver present  ?     Bed alarm activated    COMMUNICATION/COLLABORATION:   The patient?s plan of care was discussed with: Registered Nurse    Angel Salazar DPT   Time Calculation: 40 mins

## 2019-04-15 LAB
ANION GAP SERPL CALC-SCNC: 4 MMOL/L (ref 5–15)
BUN SERPL-MCNC: 10 MG/DL (ref 6–20)
BUN/CREAT SERPL: 19 (ref 12–20)
CALCIUM SERPL-MCNC: 8.5 MG/DL (ref 8.5–10.1)
CHLORIDE SERPL-SCNC: 108 MMOL/L (ref 97–108)
CO2 SERPL-SCNC: 32 MMOL/L (ref 21–32)
CREAT SERPL-MCNC: 0.54 MG/DL (ref 0.55–1.02)
GLUCOSE SERPL-MCNC: 92 MG/DL (ref 65–100)
HGB BLD-MCNC: 8.8 G/DL (ref 11.5–16)
POTASSIUM SERPL-SCNC: 3 MMOL/L (ref 3.5–5.1)
SODIUM SERPL-SCNC: 144 MMOL/L (ref 136–145)

## 2019-04-15 PROCEDURE — 85018 HEMOGLOBIN: CPT

## 2019-04-15 PROCEDURE — 97530 THERAPEUTIC ACTIVITIES: CPT

## 2019-04-15 PROCEDURE — 74011250636 HC RX REV CODE- 250/636: Performed by: PHYSICIAN ASSISTANT

## 2019-04-15 PROCEDURE — 36415 COLL VENOUS BLD VENIPUNCTURE: CPT

## 2019-04-15 PROCEDURE — 97116 GAIT TRAINING THERAPY: CPT

## 2019-04-15 PROCEDURE — 65270000029 HC RM PRIVATE

## 2019-04-15 PROCEDURE — 97110 THERAPEUTIC EXERCISES: CPT

## 2019-04-15 PROCEDURE — 94760 N-INVAS EAR/PLS OXIMETRY 1: CPT

## 2019-04-15 PROCEDURE — 80048 BASIC METABOLIC PNL TOTAL CA: CPT

## 2019-04-15 PROCEDURE — 74011250637 HC RX REV CODE- 250/637: Performed by: ORTHOPAEDIC SURGERY

## 2019-04-15 RX ADMIN — HYDROMORPHONE HYDROCHLORIDE 4 MG: 2 TABLET ORAL at 11:39

## 2019-04-15 RX ADMIN — Medication 10 ML: at 15:54

## 2019-04-15 RX ADMIN — Medication 5 ML: at 22:00

## 2019-04-15 RX ADMIN — CARVEDILOL 3.12 MG: 3.12 TABLET, FILM COATED ORAL at 09:16

## 2019-04-15 RX ADMIN — SENNOSIDES, DOCUSATE SODIUM 1 TABLET: 50; 8.6 TABLET, FILM COATED ORAL at 17:48

## 2019-04-15 RX ADMIN — FAMOTIDINE 20 MG: 20 TABLET ORAL at 09:17

## 2019-04-15 RX ADMIN — ASPIRIN 325 MG: 325 TABLET, COATED ORAL at 09:16

## 2019-04-15 RX ADMIN — HYDROMORPHONE HYDROCHLORIDE 0.5 MG: 2 INJECTION INTRAMUSCULAR; INTRAVENOUS; SUBCUTANEOUS at 23:47

## 2019-04-15 RX ADMIN — ASPIRIN 325 MG: 325 TABLET, COATED ORAL at 17:48

## 2019-04-15 RX ADMIN — FAMOTIDINE 20 MG: 20 TABLET ORAL at 17:48

## 2019-04-15 RX ADMIN — ACETAMINOPHEN 650 MG: 325 TABLET, FILM COATED ORAL at 15:52

## 2019-04-15 RX ADMIN — POLYETHYLENE GLYCOL 3350 17 G: 17 POWDER, FOR SOLUTION ORAL at 09:17

## 2019-04-15 RX ADMIN — HYDROMORPHONE HYDROCHLORIDE 0.5 MG: 2 INJECTION INTRAMUSCULAR; INTRAVENOUS; SUBCUTANEOUS at 13:27

## 2019-04-15 RX ADMIN — ACETAMINOPHEN 650 MG: 325 TABLET, FILM COATED ORAL at 21:54

## 2019-04-15 RX ADMIN — ACETAMINOPHEN 650 MG: 325 TABLET, FILM COATED ORAL at 03:28

## 2019-04-15 RX ADMIN — CARVEDILOL 3.12 MG: 3.12 TABLET, FILM COATED ORAL at 17:48

## 2019-04-15 RX ADMIN — HYDROMORPHONE HYDROCHLORIDE 4 MG: 2 TABLET ORAL at 21:54

## 2019-04-15 RX ADMIN — Medication 10 ML: at 06:00

## 2019-04-15 RX ADMIN — ACETAMINOPHEN 650 MG: 325 TABLET, FILM COATED ORAL at 09:16

## 2019-04-15 RX ADMIN — SENNOSIDES, DOCUSATE SODIUM 1 TABLET: 50; 8.6 TABLET, FILM COATED ORAL at 09:17

## 2019-04-15 RX ADMIN — HYDROMORPHONE HYDROCHLORIDE 4 MG: 2 TABLET ORAL at 15:52

## 2019-04-15 RX ADMIN — HYDROMORPHONE HYDROCHLORIDE 0.5 MG: 2 INJECTION INTRAMUSCULAR; INTRAVENOUS; SUBCUTANEOUS at 19:25

## 2019-04-15 RX ADMIN — HYDROMORPHONE HYDROCHLORIDE 4 MG: 2 TABLET ORAL at 07:36

## 2019-04-15 NOTE — PROGRESS NOTES
Problem: Mobility Impaired (Adult and Pediatric)  Goal: *Therapy Goal (Edit Goal, Insert Text)  Description  Physical Therapy Goals  Initiated 4/13/2019    1. Patient will move from supine to sit and sit to supine  in bed with supervision/set-up within 4 days. 2. Patient will perform sit to stand with minimal assistance/contact guard assist within 4 days. 3. Patient will ambulate with supervision/set-up for 50 feet with the least restrictive device within 4 days. 4. Patient will ascend/descend 3 stairs with left handrail(s) with minimal assistance/contact guard assist within 4 days. 5. Patient will perform home exercise program per protocol with minimal assistance/contact guard assist within 4 days. 6. Patient will demonstrate AROM 0-90 degrees in operative joint within 4 days. 4/15/2019 1553 by Micah Corbin PT, DPT  Outcome: Progressing Towards Goal   PHYSICAL THERAPY TREATMENT  Patient: Kate Rollins (71 y.o. female)  Date: 4/15/2019  Diagnosis: Osteoarthritis of right knee, unspecified osteoarthritis type [M17.11] <principal problem not specified>  Procedure(s) (LRB):  LEFT TOTAL KNEE WITH MAKOPLASTY (Left) 3 Days Post-Op  Precautions: Fall; WBAT  Chart, physical therapy assessment, plan of care and goals were reviewed. ASSESSMENT:  Pt Karina Fink ambulates increased distance with increased pace and performs supine exercises with assistance as below. Pt seen s/p 90 min rest and IV dilaudid administration. She requires up to minimal assistance for mobility as below and occasional cues for safe techniques. B knee ROM remains limited; reviewed importance of knee flexion exercises and benefits of increased ROM for functional activities. Encouraged pt to mobilize out of bed to chair with staff assistance for all meals. Progression toward goals:  ?      Improving appropriately and progressing toward goals  ? Improving slowly and progressing toward goals  ?       Not making progress toward goals and plan of care will be adjusted     PLAN:  Patient continues to benefit from skilled intervention to address the above impairments. Continue treatment per established plan of care. Discharge Recommendations:  Rehab  Further Equipment Recommendations for Discharge:  defer to rehab      SUBJECTIVE:   Patient stated \"Pick it up and bend it. ?    Pt received supine, agreeable to PT and cleared by RN. OBJECTIVE DATA SUMMARY:   Critical Behavior:  Neurologic State: Alert, Appropriate for age  Orientation Level: Oriented X4  Cognition: Appropriate decision making, Appropriate for age attention/concentration, Appropriate safety awareness, Follows commands  Safety/Judgement: Awareness of environment  Range of Motion:                    LLE AROM  L Knee Flexion: 40  L Knee Extension: 0     Functional Mobility Training:  Bed Mobility:     Supine to Sit: Additional time;Minimum assistance  Sit to Supine: Additional time;Minimum assistance  Scooting: Additional time;Stand-by assistance        Transfers:  Sit to Stand: Additional time;Contact guard assistance; cues for techniques  Stand to Sit: Additional time;Contact guard assistance                    Other: commode with minimal assistance        Balance:  Sitting: Without support  Sitting - Static: Good (unsupported)  Sitting - Dynamic: Good (unsupported)  Standing: With support  Standing - Static: Good  Standing - Dynamic : Fair  Ambulation/Gait Training:  Distance (ft): (35' x 2)  Assistive Device: Walker, rolling;Gait belt  Ambulation - Level of Assistance: Contact guard assistance        Gait Abnormalities: Antalgic;Decreased step clearance        Base of Support: Widened  Stance: Left decreased  Speed/Elsia: Pace decreased (<100 feet/min)  Step Length: Right shortened                  Step-to pattern, no loss of balance; occasional cues for RW techniques. Stairs:            Therapeutic Exercises:     EXERCISE   Sets   Reps   Active Active Assist Passive Self ROM   Comments   Ankle Pumps 1 10 ?                                        ?                                        ?                                        ?                                           Quad Sets 1 10 ?                                        ?                                        ?                                        ?                                           Hamstring Sets   ? ?                                        ?                                        ?                                           Short Arc Quads   ? ?                                        ?                                        ?                                           Knee Extension Stretch     ? ?                                          ?                                          ?                                           Heel Slides 2 5 ?                                        ?                                        ?                                        ?                                           Long Arc Quads   ? ?                                        ?                                        ?                                           Knee Flexion Stretch   ? ?                                        ?                                        ?                                           Straight Leg Raises 1 10 ?                                        ?                                        ?                                        ?                                             Pain:  Pain Scale 1: Numeric (0 - 10)  Pain Intensity 1: 3  Pain Location 1: Knee  Pain Orientation 1: Left  Pain Description 1: Aching  Pain Intervention(s) 1: Medication (see MAR)  Activity Tolerance:  Limited by fatigue.   Please refer to the flowsheet for vital signs taken during this treatment. After treatment:   ? Patient left in no apparent distress sitting up in chair  ? Patient left in no apparent distress in bed  ? Call bell left within reach  ? Nursing notified  ? Caregiver present  ?  Bed alarm activated    COMMUNICATION/COLLABORATION:   The patient?s plan of care was discussed with: Registered Nurse, Rehabilitation Attendant and Care Manager     Hanley Litten, PT, DPT   Time Calculation: 40 mins

## 2019-04-15 NOTE — PROGRESS NOTES
4/15/2019   5:02 PM  CM notified pt [address] unable to accept, no bed availability; additional choice is [facility], choice letter signed, referral sent in [facility] link, s/w Dennismicah Vincent she will review insurance and f/u in AM.  DME/RW delivered to pt's bedside. Plan  A: d/c to SNF for rehab [facility] pending acceptance  Plan B: d/c to home w/ HH if cleared by PT for stairs  Will continue to follow and assist  Yael Levy      3:17 PM  CM met w/ family they would like pt to go to rehab, choice offered, prefer [address], choice  letter signed, copy to chart, referral sent in [employer]. CM notified insurance will only cover one ambulatory aide, they would like RW, as family has WC from  family member, CM notified [facility] pt wants RW. Will continue to follow and assist.  Yael Levy      1:52 PM  [facility] has declined pt due to [facility] date. CM discussed pt w/ PT, pt is making slow progress ambulated 10' in 4 minutes also not safe for d/c to home as she is unable to perform stairs safely, discussed w/ PT options for d/c including SNF or Rehab, also additional DME to include WC. CM discussed w/ Dr. Clemencia Canela he is agreeable for pt to d/c to SNF for short stay. CM discussed DME w/ pt she is agreeable, choice offered, letter signed referral for DME sent to [facility] in [employer]. Yael Levy        9:10 AM  EMR reviewed, PT to work w/ pt today, order for [facility] PT noted, pt has chosen [employer], referral sent in [address] [facility] [address] need order for DME/RW. Will follow.   Yael Levy

## 2019-04-15 NOTE — PROGRESS NOTES
Problem: Falls - Risk of  Goal: *Absence of Falls  Description  Document Tierra Miller Fall Risk and appropriate interventions in the flowsheet. Outcome: Progressing Towards Goal     Problem: Patient Education: Go to Patient Education Activity  Goal: Patient/Family Education  Outcome: Progressing Towards Goal     Problem: Pressure Injury - Risk of  Goal: *Prevention of pressure injury  Description  Document Oracio Scale and appropriate interventions in the flowsheet.   Outcome: Progressing Towards Goal     Problem: Patient Education: Go to Patient Education Activity  Goal: Patient/Family Education  Outcome: Progressing Towards Goal     Problem: Patient Education: Go to Patient Education Activity  Goal: Patient/Family Education  Outcome: Progressing Towards Goal

## 2019-04-15 NOTE — PROGRESS NOTES
PHYSICAL THERAPY TREATMENT  Patient: Edgar Jones (89 y.o. female)  Date: 4/15/2019  Diagnosis: Osteoarthritis of right knee, unspecified osteoarthritis type [M17.11] <principal problem not specified>  Procedure(s) (LRB):  LEFT TOTAL KNEE WITH MAKOPLASTY (Left) 3 Days Post-Op  Precautions: Fall  Chart, physical therapy assessment, plan of care and goals were reviewed. ASSESSMENT:  Pt Radha Regan demonstrates slowly improving mobility and activity tolerance. She requires up to [facility] for flat surface mobility and continues efforts at stair training. She demonstrates very slow pace throughout all mobility, requiring 4 minutes time to ambulate 10'. Pt attempts stair negotiation using multiple methods, however is unable to ascend more than 1 stair given situation similar to home set-up. Stair negotiation limited by decreased ROM at bilateral knees and decreased B knee strength. Pt able to perform flat surface mobility at level sufficient for function within home environment given family assistance, however is unable to negotiate stairs to enter/exit home. Expect low likelihood of substantial improvement in stair negotiation with additional session today. Recommend wheelchair use for stair negotiation. Discussed disposition options with care manager. Progression toward goals:  ?      Improving appropriately and progressing toward goals  ? Improving slowly and progressing toward goals  ? Not making progress toward goals and plan of care will be adjusted     PLAN:  Patient continues to benefit from skilled intervention to address the above impairments. Continue treatment per established plan of care. Discharge Recommendations: To Be Determined  Further Equipment Recommendations for Discharge:  wheelchair       SUBJECTIVE:   Patient stated ? I have to pick it up,? re: surgical LE swing phase of gait. Pt received supine, agreeable to PT and cleared by RN.       OBJECTIVE DATA SUMMARY:   Critical Behavior:  Neurologic State: Alert, Appropriate for age  Orientation Level: Oriented X4  Cognition: Appropriate decision making, Appropriate for age attention/concentration, Appropriate safety awareness, Follows commands  Safety/Judgement: Awareness of environment                            Functional Mobility Training:  Bed Mobility:     Supine to Sit: Additional time;Minimum assistance     Scooting: Additional time;Stand-by assistance        Transfers:  Sit to Stand: Additional time;Contact guard assistance  Stand to Sit: Additional time;Contact guard assistance                             Balance:  Sitting: Without support  Sitting - Static: Good (unsupported)  Sitting - Dynamic: Good (unsupported)  Standing: With support  Standing - Static: Good  Standing - Dynamic : Fair  Ambulation/Gait Training:  Distance (ft): 50 Feet (ft)  Assistive Device: Walker, rolling;Gait belt  Ambulation - Level of Assistance: Contact guard assistance        Gait Abnormalities: Antalgic;Decreased step clearance        Base of Support: Widened  Stance: Left decreased  Speed/Elisa: Pace decreased (<100 feet/min)  Step Length: Right shortened         Stairs:  Pt attempts stair negotiation using 1 railing in multiple methods: 1 UE on L railing + cane contralaterally, attempted both sides; 2 UEs on railing with side-stepping technique, both sides. Pt demonstrating significant circumduction of BLEs with stair negotiation, decreased motor planning and command-following, requiring repeated cues for techniques. She endorses significant anxiety. During attempts. Pain:      Reports 7/10 surgical knee pain. Offered rest, repositioning, cold pack. Activity Tolerance:   Limited by fatigue, pain. Please refer to the flowsheet for vital signs taken during this treatment. After treatment:   ? Patient left in no apparent distress sitting up in chair  ? Patient left in no apparent distress in bed  ?  Call Anisa Lizama left within reach  ? Nursing notified  ? Caregiver present  ?  chair  alarm activated    COMMUNICATION/COLLABORATION:   The patient?s plan of care was discussed with: Registered Nurse, Rehabilitation Attendant and care manager     Luis Chung, PT, DPT   Time Calculation: 70 mins

## 2019-04-15 NOTE — PROGRESS NOTES
Physical Therapy Note:  Wheelchair order. Due to this patient's medical diagnosis of R TKA and L knee impairments resulting in bilateral LE weakness and decreased bilateral knee flexion, the patient requires a manual wheelchair for home and community mobility. The patient is unable to negotiate stairs safely and without considerable assistance using handrails, crutches, or cane. The patient will require assistance in propelling a standard wheelchair thus a lightweight wheelchair is required. The following wheelchair features are necessary:  The patient requires elevating leg rests due to limited BLE range of motion. The patient requires a foam cushion in order to maintain skin integrity and avoid wounds.          (full PT note to follow.)

## 2019-04-15 NOTE — PROGRESS NOTES
Physical Therapy Note:    PT treatment attempted and deferred. PT receiving treatment from another service and unavailable to participate. Will follow later this morning for PT treatment.

## 2019-04-16 PROCEDURE — 97530 THERAPEUTIC ACTIVITIES: CPT

## 2019-04-16 PROCEDURE — 97116 GAIT TRAINING THERAPY: CPT

## 2019-04-16 PROCEDURE — 74011250637 HC RX REV CODE- 250/637: Performed by: ORTHOPAEDIC SURGERY

## 2019-04-16 PROCEDURE — 94760 N-INVAS EAR/PLS OXIMETRY 1: CPT

## 2019-04-16 PROCEDURE — 97110 THERAPEUTIC EXERCISES: CPT

## 2019-04-16 RX ADMIN — HYDROCHLOROTHIAZIDE 50 MG: 25 TABLET ORAL at 08:27

## 2019-04-16 RX ADMIN — ASPIRIN 325 MG: 325 TABLET, COATED ORAL at 08:30

## 2019-04-16 RX ADMIN — CARVEDILOL 3.12 MG: 3.12 TABLET, FILM COATED ORAL at 08:32

## 2019-04-16 RX ADMIN — HYDROMORPHONE HYDROCHLORIDE 4 MG: 2 TABLET ORAL at 12:54

## 2019-04-16 RX ADMIN — LISINOPRIL 40 MG: 20 TABLET ORAL at 08:26

## 2019-04-16 RX ADMIN — ASPIRIN 325 MG: 325 TABLET, COATED ORAL at 17:05

## 2019-04-16 RX ADMIN — FAMOTIDINE 20 MG: 20 TABLET ORAL at 08:32

## 2019-04-16 RX ADMIN — SENNOSIDES, DOCUSATE SODIUM 1 TABLET: 50; 8.6 TABLET, FILM COATED ORAL at 08:30

## 2019-04-16 RX ADMIN — SENNOSIDES, DOCUSATE SODIUM 1 TABLET: 50; 8.6 TABLET, FILM COATED ORAL at 17:05

## 2019-04-16 RX ADMIN — POLYETHYLENE GLYCOL 3350 17 G: 17 POWDER, FOR SOLUTION ORAL at 08:33

## 2019-04-16 RX ADMIN — HYDROMORPHONE HYDROCHLORIDE 4 MG: 2 TABLET ORAL at 08:25

## 2019-04-16 RX ADMIN — ACETAMINOPHEN 650 MG: 325 TABLET, FILM COATED ORAL at 16:04

## 2019-04-16 RX ADMIN — FAMOTIDINE 20 MG: 20 TABLET ORAL at 17:05

## 2019-04-16 RX ADMIN — Medication 10 ML: at 16:05

## 2019-04-16 RX ADMIN — HYDROMORPHONE HYDROCHLORIDE 4 MG: 2 TABLET ORAL at 17:05

## 2019-04-16 RX ADMIN — Medication 5 ML: at 06:52

## 2019-04-16 RX ADMIN — CARVEDILOL 3.12 MG: 3.12 TABLET, FILM COATED ORAL at 17:05

## 2019-04-16 RX ADMIN — ACETAMINOPHEN 650 MG: 325 TABLET, FILM COATED ORAL at 08:28

## 2019-04-16 NOTE — PROGRESS NOTES
4/16/2019   4:55 PM  PT has ok'd pt for d/c to home w/ HH, Dr. Yael Jack is in agreement. Additional referral was sent to [facility] and pt is accepted, as [facility] are out of network. HH added to AVS.  CM notified [facility] of d/c, AVS sent  In [employer]. Willy Miller      3:35 PM  [facility] [employer] has accepted pt, insurance auth started, pt will have 30% co-pay of $97.20/day. CM notified pt of acceptance and co-pay, family is ok with co-pay, they will transport  Awaiting auth  Willy Miller      1:40 PM  CM s/w PT pt not demonstrating safety for d/c to home, will assess again this afternoon. CM discussed possible d/c to home w/ HH if cleared by PT, choice offered pt prefers [facility] and [facility] in that order, referrals sent  In [employer]. CM notified [facility] not in network, notified pt, additional choice for [facility] [employer], referral sent in [employer], placed TC to admissions to notify pt is ready for d/c, they will review and get insurance auth. Will continue to follow. Willy Miller      9:40 AM  D/C pending rehab placement vs HH, awaiting PT eval for today. CM placed TC to [facility], they are reviewing. [facility] has accepted, however family declined, referral sent to [facility], however they do not have pvt rooms, family declined. Will continue to follow.   Willy Miller

## 2019-04-16 NOTE — PROGRESS NOTES
Problem: Falls - Risk of  Goal: *Absence of Falls  Description  Document Arun Schwartz Fall Risk and appropriate interventions in the flowsheet. Outcome: Progressing Towards Goal     Problem: Patient Education: Go to Patient Education Activity  Goal: Patient/Family Education  Outcome: Progressing Towards Goal     Problem: Pressure Injury - Risk of  Goal: *Prevention of pressure injury  Description  Document Oracio Scale and appropriate interventions in the flowsheet.   Outcome: Progressing Towards Goal     Problem: Patient Education: Go to Patient Education Activity  Goal: Patient/Family Education  Outcome: Progressing Towards Goal     Problem: Patient Education: Go to Patient Education Activity  Goal: Patient/Family Education  Outcome: Progressing Towards Goal     Problem: Knee Replacement: Post-Op Day 2  Goal: Off Pathway (Use only if patient is Off Pathway)  Outcome: Progressing Towards Goal  Goal: Activity/Safety  Outcome: Progressing Towards Goal  Goal: Diagnostic Test/Procedures  Outcome: Progressing Towards Goal  Goal: Medications  Outcome: Progressing Towards Goal  Goal: Respiratory  Outcome: Progressing Towards Goal  Goal: Treatments/Interventions/Procedures  Outcome: Progressing Towards Goal  Goal: Psychosocial  Outcome: Progressing Towards Goal  Goal: *Met physical therapy criteria for discharge to the next level of care  Outcome: Progressing Towards Goal  Goal: *Optimal pain control with oral analgesia  Outcome: Progressing Towards Goal  Goal: *Hemodynamically stable  Outcome: Progressing Towards Goal  Goal: *Tolerating diet  Outcome: Progressing Towards Goal  Goal: *Patient verbalizes understanding of discharge instructions  Outcome: Progressing Towards Goal

## 2019-04-16 NOTE — PROGRESS NOTES
Spoke with RA Moncada about patient clearing PT.  Edelmira Cruz spoke with Minnie Loo PT concerning [facility] vs SNF. She also spoke with Dr. Aaron Alonso and he stated that patient can go home with home health at this time. Will speak with MAURICE Narayanan to finalize set up with [facility].

## 2019-04-16 NOTE — PROGRESS NOTES
Visit documented 4/16/19  Spiritual Care Partner Volunteer visited patient in 5 Med Surg on {4/15/19.   Documented by: Car Mcdonald., MS., [address] [facility] Jose Martin ([id number])

## 2019-04-16 NOTE — ROUTINE PROCESS
Bedside and Verbal shift change report given to [address] [place] [address] (oncoming nurse) by Erickson Juan (offgoing nurse). Report included the following information SBAR, Kardex, Intake/Output, MAR and Recent Results.

## 2019-04-16 NOTE — PROGRESS NOTES
Patient has met criteria for discharge. Instructions and prescriptions for Dilaudid, Protonix, and Aspirin given to patient. Patient and family verbalized understanding of instructions. IV D/C'd. Patient to call to schedule 2 week follow up appointment. Home via wheelchair with family.

## 2019-04-16 NOTE — PROGRESS NOTES
Problem: Patient Education: Go to Patient Education Activity  Goal: Patient/Family Education  Outcome: Progressing Towards Goal  Note:   PHYSICAL THERAPY TREATMENT  Patient: Lucila Adkins (18 y.o. female)  Date: 4/16/2019  Diagnosis: Osteoarthritis of right knee, unspecified osteoarthritis type [M17.11] <principal problem not specified>  Procedure(s) (LRB):  LEFT TOTAL KNEE WITH MAKOPLASTY (Left) 4 Days Post-Op  Precautions: Fall, WBAT  Chart, physical therapy assessment, plan of care and goals were reviewed. ASSESSMENT:  Ms. Rich Gray is pod #4 and requires cues for sequences of transfers, step to and step through gait and stair climbing. Approached by CM this am that disposition pending progress in PT this am: SNF vs HHPT. Arrived in room and both of patient's daughters hypervigilant. Patient anxious to use bathroom commode. Dgt begins to move LLE for patient and PT intervened. Educated patient to mobilize her own leg either AROM or AAROM with her own hands or use of gait belt. Patient demeanor is very passive approach toward her knee rehab and appears to be dependent on others to do for her. Patient attended pre op education class with emphasis on exercises and knee ROM in early acute phase. Although patient follows verbal cues and is cooperative, she also voices many reasons as to why she cannot mobilize ie. Lack of knee cartilage on R Knee. Patient reports she does not know where her purple exercise sheet handout is and her dgt reports she has not been exercising in bed. Reminded patient her rehab is her responsibility and ownership and reminded her of the pre op education she received. Patient was determined to ambulate and ascend and descend stairs with PT and do well. Patient was doing very well with tx. Began step through gait without knee buckling. Able to increase gait pace. Able to ascend and descend 3 steps with 2 railings with minimal assistance and verbal cues.  Patient anxious and slow movement but able to follow through and complete the tasks. Denied CP dizziness lightheadedness, but reports B knee pain left>right. As she paused in hallway to listen and attend to PT's demonstration she suddenly let go of RW with both hands and slow controlled lean over to her right side where her dgt was standing. Patient was secured in stance with minimal assist x 1 and vcue to put her hands on RW. PT was going to clear for d/c until this episode occurred at end of tx. Will follow again today at ~ 3pm however recommend SNF. CM has SNF available however family wants private room SNF which is proving difficult to find within patient's insurance network. Progression toward goals:  ?      Improving appropriately and progressing toward goals  ? Improving slowly and progressing toward goals  ? Not making progress toward goals and plan of care will be adjusted     PLAN:  Patient continues to benefit from skilled intervention to address the above impairments. Continue treatment per established plan of care. Discharge Recommendations:  [facility]  Further Equipment Recommendations for Discharge:  none new     SUBJECTIVE:   Patient stated ? I want to go home. ? Patient received supine, agreeable to PT and cleared by RN for tx. OBJECTIVE DATA SUMMARY:   Critical Behavior:  Neurologic State: Alert, Appropriate for age  Orientation Level: Oriented X4  Cognition: Follows commands  Safety/Judgement: Awareness of environment  Range of Motion:                    LLE AROM  L Knee Flexion: 44  L Knee Extension: 10     Functional Mobility Training:  Bed Mobility:     Supine to Sit: Stand-by assistance; Additional time(shown how to use gait belt as leg )              Transfers:  Sit to Stand: Contact guard assistance; Additional time;Assist x1  Stand to Sit: Contact guard assistance; Additional time;Assist x1  Stand Pivot Transfers: Contact guard assistance; Additional time;Assist x1     Bed to Chair: Contact guard assistance; Additional time;Assist x1                    Balance:  Sitting: Without support  Sitting - Static: Good (unsupported)  Sitting - Dynamic: Good (unsupported)  Standing: With support  Standing - Static: Constant support;Good  Standing - Dynamic : Fair  Ambulation/Gait Training:  Distance (ft): 60 Feet (ft)(x 2)  Assistive Device: Gait belt;Walker, rolling  Ambulation - Level of Assistance: Contact guard assistance(verbal cues and additional time)        Gait Abnormalities: Antalgic;Decreased step clearance     Left Side Weight Bearing: As tolerated  Base of Support: Widened  Stance: Left decreased  Speed/Elisa: Pace decreased (<100 feet/min)  Step Length: Left shortened;Right shortened                    Stairs:  Number of Stairs Trained: 4  Stairs - Level of Assistance: Minimum assistance; Additional time;Assist X1     Therapeutic Exercises:     EXERCISE   Sets   Reps   Active Active Assist   Passive Self ROM   Comments   Ankle Pumps   ? ?                                        ?                                        ?                                           Quad Sets   ? ?                                        ?                                        ?                                           Hamstring Sets   ? ?                                        ?                                        ?                                           Short Arc Quads   ? ?                                        ?                                        ?                                           Knee Extension Stretch     ? ?                                          ?                                          ?                                           Heel Slides   ? ?                                        ?                                        ?                                           Long Arc Quads   ? ?                                        ?                                        ?                                           Knee Flexion Stretch  3 ?                                        ?                                        ?                                        ?                                           Straight Leg Raises   ? ?                                        ?                                        ?                                             Pain:  Pain Scale 1: Numeric (0 - 10)  Pain Intensity 1: 3              Activity Tolerance:     Please refer to the flowsheet for vital signs taken during this treatment. After treatment:   ? Patient left in no apparent distress sitting up in chair  ? Patient left in no apparent distress in bed  ? Call bell left within reach  ? Nursing notified  ? Caregiver present  ?  Bed alarm activated    COMMUNICATION/COLLABORATION:   The patient?s plan of care was discussed with: Registered Nurse and     Christoph Oseguera, PT, DPT   Time Calculation: 42 mins

## 2019-04-16 NOTE — PROGRESS NOTES
Problem: Mobility Impaired (Adult and Pediatric)  Goal: *Therapy Goal (Edit Goal, Insert Text)  Description  Physical Therapy Goals  Initiated 4/13/2019    1. Patient will move from supine to sit and sit to supine  in bed with supervision/set-up within 4 days. 2. Patient will perform sit to stand with minimal assistance/contact guard assist within 4 days. 3. Patient will ambulate with supervision/set-up for 50 feet with the least restrictive device within 4 days. 4. Patient will ascend/descend 3 stairs with left handrail(s) with minimal assistance/contact guard assist within 4 days. 5. Patient will perform home exercise program per protocol with minimal assistance/contact guard assist within 4 days. 6. Patient will demonstrate AROM 0-90 degrees in operative joint within 4 days. Outcome: Progressing Towards Goal  Note:   PHYSICAL THERAPY TREATMENT/DISCHARGE  Patient: Lorelei Che (48 y.o. female)  Date: 4/16/2019  Diagnosis: Osteoarthritis of right knee, unspecified osteoarthritis type [M17.11] <principal problem not specified>  Procedure(s) (LRB):  LEFT TOTAL KNEE WITH MAKOPLASTY (Left) 4 Days Post-Op  Precautions: Fall, WBAT  Chart, physical therapy assessment, plan of care and goals were reviewed. ASSESSMENT:  Concerned this patient may go home and not exercise as often as needed but patient and family assure PT that she will have 24/7 support, assistance as needed, and encouragement to follow rehab instructions.  stated firmly \"I will make sure she does all those exercises. \" Patient indicates verbal understanding of what therapist is stating in regards to SNF for rehab to become more independent and have an hour PT daily to follow through with program however patient and family report repeatedly they are confident to manage at home and do not consent to discharge to SNF. If patient follows through with the her rehab and the recommendations made pre op and post op, she will do well. She has demonstrated she can perform mobility tasks repeatedly with MOD I with exception of stair climbing. Her  and dgts are hypervigilant to support patient and have been asked to do less and have the patient move her LEs herself on and off the bed. They have been educated the patient needs hands on assistance for stairs only but that she is able to transfer herself to edge of bed, to stand<>sit, SPTs, and ambulate on level surfaces step through pattern with RW with MOD I. Family state they aren't taking any chances and are going to hold onto her with a safety gait belt. Pt. has been educated as to the alternative of negative outcome and/or fall and reports she plans to have positive recovery without taking risks. She agrees she will not go home and just stay in bed. She understands the importance of working on knee flexion and ext. Her family has been given a safety gait belt and her  demonstrated he is able to guard patient via gait belt up and down 4 steps, twice this afternoon. All exercises were reviewed via handout and patient demonstrated independence with all with exception of SAQs and SLRs. Patient and family politely declined SNF for rehab but appear to understand why PT was recommending. All state they are confident to discharge to home and manage as well as follow through with patient's program. Spoke with RN Keisha Mijares. And NP Shantell DA SILVA about attaining discharge orders. PT finished ~ 1610 and patient requests her next pain med at almost 5 pm.   Progression toward goals:  ?          Improving appropriately and progressing toward goals  ? Improving slowly and progressing toward goals  ? Not making progress toward goals and plan of care will be adjusted     PLAN:  Patient will be discharged from acute skilled physical therapy at this time. Rationale for discharge:  ?     Goals Achieved  ? Plateau Reached  ? Patient not participating in therapy  ?      Other: safe for discharge to home  Discharge Recommendations:  Home Health  Further Equipment Recommendations for Discharge:  none new     SUBJECTIVE:   After all educated and conversation described in assessment occurred, patient then turned to PT and asked, can I go home in the morning? Explained to patient PT cannot document she is ready for discharge and confident about managing at home but ask the MD to delay another night. Explained to her and dgts she could be held liable for hospital night stay due to lack of ins coverage. All appeared to understand. OBJECTIVE DATA SUMMARY:   Critical Behavior:  Neurologic State: Alert, Appropriate for age  Orientation Level: Oriented X4  Cognition: Follows commands  Safety/Judgement: Awareness of environment  Range of Motion:                    LLE AROM  L Knee Flexion: 46  L Knee Extension: 8     Functional Mobility Training:  Bed Mobility:  Rolling: Modified independent  Supine to Sit: mod I  Sit to Supine: Mod I  Scooting: Additional time        Transfers:  Sit to Stand:MOD I  Stand to Sit: MOD I  Stand Pivot Transfers: MOD I    Bed to Chair:MOD I                    Balance:  Sitting: Intact  Sitting - Static: Good (unsupported)  Sitting - Dynamic: Good (unsupported)  Standing: With support  Standing - Static: Constant support;Good  Standing - Dynamic : Constant support;Good  Ambulation/Gait Training:  Distance (ft): 110 Feet (ft)  Assistive Device: Gait belt;Walker, rolling  Ambulation - Level of Assistance: MOD I step through pattern, absence of knee instability or buckling   Improved angeline, step through pattern        Gait Abnormalities: Antalgic;      Left Side Weight Bearing: As tolerated  Base of Support: Widened  Stance: Left decreased  Speed/Angeline: Pace decreased (<100 feet/min)  Step Length: Left shortened;Right shortened       Stairs:  Number of Stairs Trained: 4  Stairs - Level of Assistance: Contact guard assistance  Rail Use: Both  Therapeutic Exercises:     EXERCISE Sets   Reps   Active Active Assist   Passive Self ROM   Comments   Ankle Pumps  10 ?                                           ?                                           ?                                           ?                                              Quad Sets  10 ?                                           ?                                           ?                                           ?                                              Hamstring Sets  10 ?                                           ?                                           ?                                           ?                                              Short Arc Quads  10 ?                                           ?                                           ?                                           ?                                              Knee Extension Stretch  1   ?                                             ?                                             ?                                             ?                                              Heel Slides  10 ?                                           ?                                           ?                                           ?                                              Long Arc Quads  NT ? ?                                           ?                                           ?                                              Knee Flexion Stretch  Previous  tx ?                                            ?                                           ?                                           ?                                              Straight Leg Raises  10 ?                                           ?                                           ?                                           ?                                                  Pain:  Pain Scale 1: Numeric (0 - 10)  Pain Intensity 1: 3 Activity Tolerance:   NAD during tx- patient reports b knee discomfort 3/10 but no other symptoms such as N&V, dizziness, lightheadedness, CP, pain elsewhere    After treatment:   ?  Patient left in no apparent distress sitting up in chair  ? Patient left in no apparent distress in bed  ? Call bell left within reach  ? Nursing notified  ? Caregiver present  ?   Bed alarm activated    COMMUNICATION/COLLABORATION:   The patients plan of care was discussed with: Registered Nurse and NP    Naun Reddy, PT, DPT   Time Calculation: 50 mins

## 2019-04-17 VITALS
DIASTOLIC BLOOD PRESSURE: 88 MMHG | HEIGHT: 64 IN | SYSTOLIC BLOOD PRESSURE: 168 MMHG | RESPIRATION RATE: 18 BRPM | OXYGEN SATURATION: 98 % | TEMPERATURE: 98.6 F | HEART RATE: 66 BPM | BODY MASS INDEX: 35.94 KG/M2 | WEIGHT: 210.54 LBS

## 2019-04-18 NOTE — OP NOTES
[address]  OPERATIVE REPORT    Name:  Savannah Koyanagi  MR#:  [id number]  :  1960  ACCOUNT #:  [de-identified]  DATE OF SERVICE:  2019      PREOPERATIVE DIAGNOSIS:  Left knee severe end-stage osteoarthritis. POSTOPERATIVE DIAGNOSIS:  Left knee severe end-stage osteoarthritis. PROCEDURE PERFORMED:  Left total knee arthroplasty with MAKOplasty. SURGEON:  Ricardo Harman MD    ASSISTANT:  None. ANESTHESIA:  General.    COMPLICATIONS:  None. SPECIMENS REMOVED:  None. IMPLANTS:  See note. ESTIMATED BLOOD LOSS:  Minimal.    FINDINGS:  As above. DISPOSITION:  Stable to recovery room. INDICATIONS:  This is a 43-year-old female who failed conservative treatment for severe end-stage osteoarthritis in her left knee. Her pain had progressed to the point that normal daily activities were severely impacted. The risks, benefits, and alternatives of total knee arthroplasty were explained in detail, and she agreed to proceed. PROCEDURE:  The patient was taken to the operating room, laid supine on the table. General anesthetic was administered. Ancef was given preoperatively per protocol, and a tourniquet was applied to the left thigh, above the knee. The knee was then prepped and draped free in the usual sterile fashion, and timeout was called. Following timeout, the limb was exsanguinated, and tourniquet was inflated to 275 mmHg. Midline incision was carried out, followed by medial parapatellar approach. The fat pad was resected. The patella was everted and 10 mm resected off the underside and metallic plate was placed to protect the cut surface of the patella. The patella was then placed in the noneverted position and swept laterally.   The tracking pins were then placed in both the distal femur as well as the proximal tibia, and reference markers were placed on both the bones as well, and then data point was entered into the computer until adequate computer anatomy match was obtained with less than 24 mm of accuracy. The position of the implant was then adjusted to account for differences in gap balancing in both fully extended as well as flexed at 90 degrees. Once the position had been set to accommodate for and equalize the gap balancing, the cortical.io robot was brought in and distal femoral cut was made, followed by chamfer cuts for a size 3 femoral component. The ACL was resected, the tibia was subluxed forward, and the robot was then used to resect the proximal tibia as well. The excess fragments of bone were then removed, and medial and lateral menisci were removed with a lamina  placed at the knee at 90 degrees of flexion. Posterior capsule was cauterized and a posterior release was carried out as well. Trialing was then commenced with a size 3 femur, 3 tibia with a 13 mm insert which gave good stability while maintaining full range of motion. Lug holes were drilled for the femur and the tray was pinned in place and the femur was removed as was the insert and the proximal tibia was punched to accept the press-fit tibia component. The wound was then irrigated and a size 3 Triathlon titanium press-fit tray was impacted into position. A 13 cruciate substituting insert was placed. The knee was brought into flexion and a 3 press-fit femur was press fit into the femur. The knee was extended again and the patella was drilled to accept the 32 press-fit patella which was compressed in place as well. The range of motion was checked and stability was checked, and there was full range of motion and good stability throughout with good patellofemoral tracking as well. The wound was then copiously irrigated with 3 liters of pulsatile lavage, and FloSeal was used along the medial and lateral gutters at the end of irrigation. The extensor mechanism was closed with interrupted #1 Vicryl, followed by self-retaining Stratafix suture.   Subcu was closed with 2-0 Vicryl and subcuticular with Dermabond. Orthopedic pain solution was injected both deep and superficial to the fascia. A sterile dressing was then applied, followed by an Ace wrap, and tourniquet was let down. There were no immediate complications.       Linda Crawford MD      [id number]  D:  04/18/2019 8:54  T:  04/18/2019 13:54  JOB #:  [id number]

## 2019-04-19 ENCOUNTER — TELEPHONE (OUTPATIENT)
Dept: INTERNAL MEDICINE CLINIC | Age: 59
End: 2019-04-19

## 2019-04-19 NOTE — TELEPHONE ENCOUNTER
James//Jesika  states he's returning your call. Bryson Diez states he needs a Verbal order to get approval for Physical Therapy for 2 times a week for 2 weeks & then 1 time a week for 1 week. Bryson Diez states a detailed message can be left on his voice mail. Please call.  Thank you

## 2019-04-19 NOTE — TELEPHONE ENCOUNTER
James/Jesika  states he needs a call back to discuss Physical Therapy orders & plan of care for patient as PT Eval was done today. Please call.  Thank you

## 2019-04-19 NOTE — DISCHARGE SUMMARY
Total Joint Discharge Summary      Patient ID:  Jeanine Finley  [id number]  64 y.o.  1960    Allergies: Patient has no known allergies. Admit date: 4/12/2019    Discharge date and time: 4/16/2019  6:38 PM     Admitting Physician: Yessy Calvo MD     Discharge Physician: Lashonda    * Admission Diagnoses: Osteoarthritis of right knee, unspecified osteoarthritis type [M17.11]    * Discharge Diagnoses:   Hospital Problems as of 4/16/2019 Date Reviewed: 4/12/2019          Codes Class Noted - Resolved POA    Osteoarthritis of right knee ICD-10-CM: M17.11  ICD-9-CM: 715.96  4/12/2019 - Present Unknown              Surgeon: Lashonda    Preoperative Medical Clearance:      * Procedure: Procedure(s):  LEFT TOTAL KNEE WITH MAKOPLASTY           Perioperative Antibiotics: Ancef  ___                                                Vancomycin  ___      Postoperative Pain Management:  oxycodone    DVT Prophylaxis:  ASA                                  FERNY Hose                                  Plexi-Pulse    Postoperative transfusions:     none Banked PRBCs     Post Op complications: none    Hemoglobin at discharge: ___    * Discharge Condition: good  Wound appears to be healing without any evidence of infection. Physical Therapy started on the day following surgery and progressed to independent ambulation with the aid of a walker. At the time of discharge, able to go up and down stairs and had understanding of precautions needed following surgery.       PT at time of DC: ___degrees    * Discharged to: Home    * Follow-up Care/Discharge instructions:  - Anticoagulate with:   - Resume pre hospital diet            - Resume home medications per medical continuation form     - Ambulate with walker, appropriate total joint protocol  - Follow up in office as scheduled       Signed:  Yessy Calvo MD  4/18/2019  10:14 PM

## 2019-05-02 RX ORDER — LISINOPRIL 40 MG/1
40 TABLET ORAL
Qty: 30 TAB | Refills: 5 | Status: SHIPPED | OUTPATIENT
Start: 2019-05-02 | End: 2019-11-05 | Stop reason: SDUPTHER

## 2019-05-02 NOTE — TELEPHONE ENCOUNTER
Pt's daughter states that pt needs to have lisinopril 40mg refilled. Also requested a phone call in regards to this.

## 2019-05-22 DIAGNOSIS — I10 ESSENTIAL HYPERTENSION: ICD-10-CM

## 2019-05-23 RX ORDER — CARVEDILOL 3.12 MG/1
TABLET ORAL
Qty: 60 TAB | Refills: 1 | Status: SHIPPED | OUTPATIENT
Start: 2019-05-23 | End: 2019-06-14 | Stop reason: SDUPTHER

## 2019-06-14 DIAGNOSIS — I10 ESSENTIAL HYPERTENSION: ICD-10-CM

## 2019-06-14 RX ORDER — CARVEDILOL 3.12 MG/1
TABLET ORAL
Qty: 60 TAB | Refills: 0 | Status: SHIPPED | OUTPATIENT
Start: 2019-06-14 | End: 2020-06-25 | Stop reason: ALTCHOICE

## 2019-07-30 RX ORDER — HYDROCHLOROTHIAZIDE 50 MG/1
TABLET ORAL
Qty: 30 TAB | Refills: 1 | Status: SHIPPED | OUTPATIENT
Start: 2019-07-30 | End: 2019-09-04 | Stop reason: SDUPTHER

## 2019-07-30 RX ORDER — VERAPAMIL HYDROCHLORIDE 240 MG/1
TABLET, FILM COATED, EXTENDED RELEASE ORAL
Qty: 30 TAB | Refills: 1 | Status: SHIPPED | OUTPATIENT
Start: 2019-07-30 | End: 2019-09-04 | Stop reason: SDUPTHER

## 2019-08-28 ENCOUNTER — TELEPHONE (OUTPATIENT)
Dept: INTERNAL MEDICINE CLINIC | Age: 59
End: 2019-08-28

## 2019-08-28 DIAGNOSIS — B37.9 YEAST INFECTION: Primary | ICD-10-CM

## 2019-08-28 RX ORDER — FLUCONAZOLE 150 MG/1
150 TABLET ORAL DAILY
Qty: 2 TAB | Refills: 0 | Status: SHIPPED | OUTPATIENT
Start: 2019-08-28 | End: 2019-08-30

## 2019-08-28 NOTE — TELEPHONE ENCOUNTER
MD Sarah Hawk LPN   Caller: Unspecified (Today, 11:44 AM)             I can prescribe two pills but it may  Come back since she is staying on antibiotic, she can also use monistat intra vaginal OTC      Spoke with patient. Two pt identifiers confirmed. Patient states that monistat does not work for her. Patient would like to have a prescription sent in for the oral medication and would like to know if she can get 1 refill added to the prescription incase the yeast infection does come back. Advised patient that I will send her request to Dr. Jeremy Tong and will give her a call back as soon as she has responded. Pt verbalized understanding of information discussed w/ no further questions at this time.

## 2019-08-28 NOTE — TELEPHONE ENCOUNTER
Patient has been on an antibiotic for 1 week for a dentist appointment. She has to be on it for 30 days, She is requesting a medication for yeast infection. It's a pill that only takes one dose. Her previous doctor had prescribed it. She did not remember the name. Do you think one pill would be good for 30 days while on antibiotic or will she need two? [facility]

## 2019-08-29 NOTE — TELEPHONE ENCOUNTER
MD Sergey Colindres LPN   Caller: Unspecified (Yesterday, 11:44 AM)             I prescribed two pills to her pharmacy of fluconazole yesterday - she should take one and if having symptoms 48 hours later then take the second one      Spoke with patient. Two pt identifiers confirmed. Patient advised of the above information and instructions from Dr. Grecia Haro. Pt verbalized understanding of information discussed w/ no further questions at this time.

## 2019-09-06 RX ORDER — HYDROCHLOROTHIAZIDE 50 MG/1
TABLET ORAL
Qty: 30 TAB | Refills: 1 | Status: SHIPPED | OUTPATIENT
Start: 2019-09-06 | End: 2019-10-02 | Stop reason: SDUPTHER

## 2019-09-06 RX ORDER — VERAPAMIL HYDROCHLORIDE 240 MG/1
TABLET, FILM COATED, EXTENDED RELEASE ORAL
Qty: 30 TAB | Refills: 1 | Status: SHIPPED | OUTPATIENT
Start: 2019-09-06 | End: 2019-10-01 | Stop reason: SDUPTHER

## 2019-10-01 RX ORDER — VERAPAMIL HYDROCHLORIDE 240 MG/1
TABLET, FILM COATED, EXTENDED RELEASE ORAL
Qty: 30 TAB | Refills: 1 | Status: SHIPPED | OUTPATIENT
Start: 2019-10-01 | End: 2019-11-05 | Stop reason: SDUPTHER

## 2019-10-02 RX ORDER — HYDROCHLOROTHIAZIDE 50 MG/1
TABLET ORAL
Qty: 30 TAB | Refills: 1 | Status: SHIPPED | OUTPATIENT
Start: 2019-10-02 | End: 2019-11-05 | Stop reason: SDUPTHER

## 2019-12-10 RX ORDER — HYDROCHLOROTHIAZIDE 50 MG/1
TABLET ORAL
Qty: 30 TAB | Refills: 1 | Status: SHIPPED | OUTPATIENT
Start: 2019-12-10 | End: 2020-01-06

## 2019-12-10 RX ORDER — VERAPAMIL HYDROCHLORIDE 240 MG/1
TABLET, FILM COATED, EXTENDED RELEASE ORAL
Qty: 30 TAB | Refills: 1 | Status: SHIPPED | OUTPATIENT
Start: 2019-12-10 | End: 2020-01-06

## 2020-04-06 RX ORDER — VERAPAMIL HYDROCHLORIDE 240 MG/1
TABLET, FILM COATED, EXTENDED RELEASE ORAL
Qty: 30 TAB | Refills: 1 | Status: SHIPPED | OUTPATIENT
Start: 2020-04-06 | End: 2020-06-24 | Stop reason: SDUPTHER

## 2020-05-07 RX ORDER — VERAPAMIL HYDROCHLORIDE 240 MG/1
TABLET, FILM COATED, EXTENDED RELEASE ORAL
Qty: 30 TAB | Refills: 1 | OUTPATIENT
Start: 2020-05-07

## 2020-05-07 RX ORDER — HYDROCHLOROTHIAZIDE 50 MG/1
TABLET ORAL
Qty: 30 TAB | Refills: 1 | OUTPATIENT
Start: 2020-05-07

## 2020-05-20 ENCOUNTER — TELEPHONE (OUTPATIENT)
Dept: INTERNAL MEDICINE CLINIC | Age: 60
End: 2020-05-20

## 2020-05-20 RX ORDER — HYDROCHLOROTHIAZIDE 50 MG/1
TABLET ORAL
Qty: 30 TAB | Refills: 0 | Status: SHIPPED | OUTPATIENT
Start: 2020-05-20 | End: 2020-06-25 | Stop reason: SDUPTHER

## 2020-05-20 NOTE — TELEPHONE ENCOUNTER
Left message for patient that we have been trying to reach her. Patient advised that she has not been seen in the office in over a year and will need to schedule an appointment before her medication will be refilled.

## 2020-05-20 NOTE — TELEPHONE ENCOUNTER
Saying she needs refills of     hydroCHLOROthiazide (HYDRODIURIL) 50 mg tablet    Asking for a response to the pharmacy.    [phone] [facility] [place]

## 2020-05-20 NOTE — TELEPHONE ENCOUNTER
Pt is returning a call from the nurse, she is asking to speak to someone about her medications, and asking if a appt is needed. Please give her a call back .

## 2020-05-20 NOTE — TELEPHONE ENCOUNTER
Spoke with patient. Two pt identifiers confirmed. Patient advised that she has not been seen in over a year and will need to make an appointment before her medication can be refilled. Patient offered an appointment with Dr. Marya Wagner on 05/26/2020. Appointment accepted. Pt verbalized understanding of information discussed w/ no further questions at this time.    Patient advised if anything changes or if unable to keep this appointment to call the office

## 2020-05-22 RX ORDER — HYDROCHLOROTHIAZIDE 50 MG/1
TABLET ORAL
Qty: 30 TAB | Refills: 1 | Status: SHIPPED | OUTPATIENT
Start: 2020-05-22 | End: 2020-06-24 | Stop reason: SDUPTHER

## 2020-06-11 RX ORDER — VERAPAMIL HYDROCHLORIDE 240 MG/1
TABLET, FILM COATED, EXTENDED RELEASE ORAL
Qty: 30 TAB | Refills: 1 | OUTPATIENT
Start: 2020-06-11

## 2020-06-11 RX ORDER — HYDROCHLOROTHIAZIDE 50 MG/1
TABLET ORAL
Qty: 30 TAB | Refills: 0 | OUTPATIENT
Start: 2020-06-11

## 2020-06-11 RX ORDER — LISINOPRIL 40 MG/1
TABLET ORAL
Qty: 30 TAB | Refills: 0 | OUTPATIENT
Start: 2020-06-11

## 2020-06-19 NOTE — TELEPHONE ENCOUNTER
Patient states she needs a call back to get a Virtual Visit appt for her Medication refill. Patient states she missed appt on 5/26/20 due to she could not figure out Virtual Visit Function. Please call to discuss Best way for patient to do appt.  Thank you      Patient last seen 4/9/2019

## 2020-06-23 NOTE — TELEPHONE ENCOUNTER
Patient states she's returning your call. Patient states she needs to discuss getting her Blood pressure medication & also needs other refills & to discuss discontinuing one of her medications that patient reports is causing her to cough. Please call.  Thank you

## 2020-06-24 RX ORDER — VERAPAMIL HYDROCHLORIDE 240 MG/1
TABLET, FILM COATED, EXTENDED RELEASE ORAL
Qty: 10 TAB | Refills: 0 | Status: SHIPPED | OUTPATIENT
Start: 2020-06-24 | End: 2020-06-25 | Stop reason: SDUPTHER

## 2020-06-24 RX ORDER — HYDROCHLOROTHIAZIDE 50 MG/1
TABLET ORAL
Qty: 10 TAB | Refills: 0 | Status: SHIPPED | OUTPATIENT
Start: 2020-06-24 | End: 2020-06-25 | Stop reason: SDUPTHER

## 2020-06-24 NOTE — TELEPHONE ENCOUNTER
Spoke with patient. Two pt identifiers confirmed. Patient advised that she will need to schedule an appointment before her medication can be refilled. Patient offered an appointment on 06/25/2020 with Dr. Nacho Child. Appointment accepted. Patient states that she is completely out of her medication and needs a few sent to her pharmacy until her visit tomorrow. Patient states that she does not want the lisinopril sent in because it is causing her to have a cough and she wants to discuss this with . Pt verbalized understanding of information discussed w/ no further questions at this time.

## 2020-06-25 ENCOUNTER — VIRTUAL VISIT (OUTPATIENT)
Dept: INTERNAL MEDICINE CLINIC | Age: 60
End: 2020-06-25

## 2020-06-25 DIAGNOSIS — I10 ESSENTIAL HYPERTENSION: ICD-10-CM

## 2020-06-25 DIAGNOSIS — E78.00 HIGH CHOLESTEROL: Primary | ICD-10-CM

## 2020-06-25 RX ORDER — HYDROCHLOROTHIAZIDE 50 MG/1
TABLET ORAL
Qty: 90 TAB | Refills: 2 | Status: CANCELLED | OUTPATIENT
Start: 2020-06-25

## 2020-06-25 RX ORDER — HYDROCHLOROTHIAZIDE 50 MG/1
TABLET ORAL
Qty: 90 TAB | Refills: 1 | Status: SHIPPED | OUTPATIENT
Start: 2020-06-25 | End: 2020-12-20

## 2020-06-25 RX ORDER — IRBESARTAN 150 MG/1
150 TABLET ORAL
Qty: 30 TAB | Refills: 1 | Status: SHIPPED | OUTPATIENT
Start: 2020-06-25 | End: 2020-07-17

## 2020-06-25 RX ORDER — VERAPAMIL HYDROCHLORIDE 240 MG/1
TABLET, FILM COATED, EXTENDED RELEASE ORAL
Qty: 90 TAB | Refills: 1 | Status: SHIPPED | OUTPATIENT
Start: 2020-06-25 | End: 2020-12-20

## 2020-06-25 RX ORDER — VERAPAMIL HYDROCHLORIDE 240 MG/1
TABLET, FILM COATED, EXTENDED RELEASE ORAL
Qty: 90 TAB | Refills: 2 | Status: CANCELLED | OUTPATIENT
Start: 2020-06-25

## 2020-06-25 NOTE — PATIENT INSTRUCTIONS
This is an established visit conducted via telemedicine. The patient has been instructed that this meets HIPAA criteria and acknowledges and agrees to this method of visitation. Donte Tena [place] 
88/97/35 
4:35 PM 
Chief Complaint Patient presents with  Hypertension Routine care  Medication Refill  
  need refills on BP  
 Medication Evaluation  
  lisinopril causes patient to cough

## 2020-06-25 NOTE — TELEPHONE ENCOUNTER
PCP: Aldo Guerrero MD    Last appt: 6/25/2020  No future appointments.     Requested Prescriptions     Pending Prescriptions Disp Refills    hydroCHLOROthiazide (HYDRODIURIL) 50 mg tablet 90 Tab 2     Sig: TAKE 1 TABLET BY MOUTH EVERY DAY    verapamil ER (CALAN-SR) 240 mg CR tablet 90 Tab 2     Sig: TAKE 1 TABLET BY MOUTH EVERY DAY EVERY NIGHT

## 2020-07-23 ENCOUNTER — OFFICE VISIT (OUTPATIENT)
Dept: INTERNAL MEDICINE CLINIC | Age: 60
End: 2020-07-23

## 2020-07-23 VITALS
RESPIRATION RATE: 18 BRPM | DIASTOLIC BLOOD PRESSURE: 82 MMHG | SYSTOLIC BLOOD PRESSURE: 149 MMHG | OXYGEN SATURATION: 97 % | WEIGHT: 255 LBS | TEMPERATURE: 96.2 F | BODY MASS INDEX: 43.77 KG/M2 | HEART RATE: 76 BPM

## 2020-07-23 DIAGNOSIS — E78.00 HIGH CHOLESTEROL: ICD-10-CM

## 2020-07-23 DIAGNOSIS — I10 ESSENTIAL HYPERTENSION: ICD-10-CM

## 2020-07-23 DIAGNOSIS — K21.9 GASTROESOPHAGEAL REFLUX DISEASE WITHOUT ESOPHAGITIS: ICD-10-CM

## 2020-07-23 DIAGNOSIS — R63.5 WEIGHT GAIN: ICD-10-CM

## 2020-07-23 DIAGNOSIS — Z11.59 ENCOUNTER FOR HEPATITIS C SCREENING TEST FOR LOW RISK PATIENT: ICD-10-CM

## 2020-07-23 DIAGNOSIS — R05.9 COUGH: ICD-10-CM

## 2020-07-23 DIAGNOSIS — Z12.39 SCREENING FOR MALIGNANT NEOPLASM OF BREAST: Primary | ICD-10-CM

## 2020-07-23 RX ORDER — IRBESARTAN 300 MG/1
300 TABLET ORAL
Qty: 30 TAB | Refills: 5 | Status: SHIPPED | OUTPATIENT
Start: 2020-07-23 | End: 2021-02-08

## 2020-07-23 RX ORDER — FAMOTIDINE 20 MG/1
20 TABLET, FILM COATED ORAL 2 TIMES DAILY
Qty: 60 TAB | Refills: 2 | Status: SHIPPED | OUTPATIENT
Start: 2020-07-23 | End: 2020-09-10

## 2020-07-23 NOTE — PROGRESS NOTES
Reviewed record in preparation for visit and have obtained necessary documentation. Identified pt with two pt identifiers(name and ). Chief Complaint   Patient presents with    Hypertension       Health Maintenance Due   Topic Date Due    Hepatitis C Test  1960    Colonoscopy  1980    DTaP/Tdap/Td  (1 - Tdap) 1983    Pap Test  1983    Cholesterol Test   2002    Shingles Vaccine (1 of 2) 2012    Mammogram  2012       Ms. Jacob Swartz has a reminder for a \"due or due soon\" health maintenance. I have asked that she discuss this further with her primary care provider for follow-up on this health maintenance. Coordination of Care Questionnaire:  :     1) Have you been to an emergency room, urgent care clinic since your last visit? no   Hospitalized since your last visit? no             2) Have you seen or consulted any other health care providers outside of [address] [place] [address] since your last visit? no  (Include any pap smears or colon screenings in this section.)    3) In the event something were to happen to you and you were unable to speak on your behalf, do you have an Advance Directive/ Living Will in place stating your wishes? NO    Do you have an Advance Directive on file? no    4) Are you interested in receiving information on Advance Directives? NO    Patient is accompanied by self I have received verbal consent from [facility] to discuss any/all medical information while they are present in the room.

## 2020-07-23 NOTE — PROGRESS NOTES
Ms. Charito Hoskins is presenting to follow up     CC:  Hypertension       HPI:    She is a 64 y.o. female who presents for evaluation of HTN previously  Complained of increased cough since lisinopril 40mg therefore will stop medication and staredt avapro. avapro 150mg and BP is 150/80s   Cough resolved but does note intermittently coming back mostly in AM   No PND  Occasional acid taste in mouth   She is also on , verapamil 240 CR and HCTZ 50mg daily       She denies CP, dyspnea, polyuria and increased thirst     She gained weight she is eating frequently at her [place]    Review of systems:  Constitutional: negative for fever, chills, weight loss, night sweats   Eyes : negative for vision changes, eye pain and discharge  Nose and Throat: negative for tinnitus, sore throat   Cardiovascular: negative for chest pain, palpitations and shortness of breath  Respiratory: negative for shortness of breath, + intermittent cough   Gastroinstestinal: negative for abdominal pain, nausea, vomiting, diarrhea, constipation, and blood in the stool  Musculoskeletal: negative for back ache and joint ache   Genitourinary: negative for dysuria, nocturia, polyuria and hematuria   Neurologic: Negative for focal weakness, numbness or incoordination  Skin: negative for rash, pruritus  Hematologic: negative for easy bruising      Past Medical History:   Diagnosis Date    Chronic constipation     Hypertension     Obesity, Class II, BMI 35-39.9     Pre-diabetes 2019    A1C- 6.1    PVC (premature ventricular contraction)     States never been to cardio- has had for many years        Past Surgical History:   Procedure Laterality Date    HX  SECTION N/A     x 3    HX COLONOSCOPY      HX TONSILLECTOMY      HX WISDOM TEETH EXTRACTION Bilateral        No Known Allergies    Current Outpatient Medications on File Prior to Visit   Medication Sig Dispense Refill    hydroCHLOROthiazide (HYDRODIURIL) 50 mg tablet TAKE 1 TABLET BY MOUTH EVERY DAY 90 Tab 1    verapamil ER (CALAN-SR) 240 mg CR tablet TAKE 1 TABLET BY MOUTH EVERY DAY EVERY NIGHT 90 Tab 1    acetaminophen (TYLENOL) 650 mg TbER Take 650 mg by mouth every eight (8) hours. Indications: Pain associated with Arthritis      cannabidiol, CBD, extract 100 mg/mL soln Take  by mouth.  COD LIVER OIL, BULK, by Does Not Apply route.  OTHER ginkgo biloba      OTHER Black seed oil      glucosamine/chondr brown A sod (GLUCOSAMINE-CHONDROITIN) 750-600 mg tab Take 2 Tabs by mouth. No current facility-administered medications on file prior to visit. family history includes Diabetes in her mother.     Social History     Socioeconomic History    Marital status:      Spouse name: Not on file    Number of children: Not on file    Years of education: Not on file    Highest education level: Not on file   Occupational History    Not on file   Social Needs    Financial resource strain: Not on file    Food insecurity     Worry: Not on file     Inability: Not on file    Transportation needs     Medical: Not on file     Non-medical: Not on file   Tobacco Use    Smoking status: Never Smoker    Smokeless tobacco: Never Used   Substance and Sexual Activity    Alcohol use: Never     Frequency: Never    Drug use: Never    Sexual activity: Yes   Lifestyle    Physical activity     Days per week: Not on file     Minutes per session: Not on file    Stress: Not on file   Relationships    Social connections     Talks on phone: Not on file     Gets together: Not on file     Attends Advent service: Not on file     Active member of club or organization: Not on file     Attends meetings of clubs or organizations: Not on file     Relationship status: Not on file    Intimate partner violence     Fear of current or ex partner: Not on file     Emotionally abused: Not on file     Physically abused: Not on file     Forced sexual activity: Not on file   Other Topics Concern    Not on file   Social History Narrative    Not on file       Visit Vitals  /82 (BP 1 Location: Right arm, BP Patient Position: Sitting)   Pulse 76   Temp (!) 96.2 °F (35.7 °C) (Axillary)   Resp 18   Wt 255 lb (115.7 kg)   SpO2 97%   BMI 43.77 kg/m²     General:  Well appearing female no acute distress  HEENT:   PERRL,normal conjunctiva. External ear and canals normal, TMs normal.  Hearing normal to voice. Nose without edema or discharge, normal septum. Lips, teeth, gums normal.  Oropharynx: no erythema, no exudates, no lesions, normal tongue. Neck:  Supple. Thyroid normal size, nontender, without nodules. No carotid bruit. No masses or lymphadenopathy  Respiratory: no respiratory distress,  no wheezing, no rhonchi, no rales. No chest wall tenderness. Cardiovascular:  RRR, normal S1S2, no murmur. Gastrointestinal: normal bowel sounds, soft, nontender, without masses. No hepatosplenomegaly. Extremities +2 pulses, no edema, normal sensation   Musculoskeletal:  Normal gait. Normal digits and nails. Normal strength and tone, no atrophy, and no abnormal movement. Skin:  No rash, no lesions, no ulcers. Skin warm, normal turgor, without induration or nodules. Neuro:  A and OX4, fluent speech, cranial nerves normal 2-12. Sensation normal to light touch.   DTR symmetrical  Psych:  Normal affect      Lab Results   Component Value Date/Time    WBC 4.2 03/27/2019 03:10 PM    HGB 8.8 (L) 04/15/2019 07:30 AM    HCT 38.1 03/27/2019 03:10 PM    PLATELET 872 63/33/9303 03:10 PM    MCV 88.0 03/27/2019 03:10 PM     Lab Results   Component Value Date/Time    Sodium 144 04/15/2019 07:30 AM    Potassium 3.0 (L) 04/15/2019 07:30 AM    Chloride 108 04/15/2019 07:30 AM    CO2 32 04/15/2019 07:30 AM    Anion gap 4 (L) 04/15/2019 07:30 AM    Glucose 92 04/15/2019 07:30 AM    BUN 10 04/15/2019 07:30 AM    Creatinine 0.54 (L) 04/15/2019 07:30 AM    BUN/Creatinine ratio 19 04/15/2019 07:30 AM    GFR est AA >60 04/15/2019 07:30 AM    GFR est non-AA >60 04/15/2019 07:30 AM    Calcium 8.5 04/15/2019 07:30 AM     No results found for: CHOL, CHOLPOCT, CHOLX, CHLST, CHOLV, HDL, HDLPOC, HDLP, LDL, LDLCPOC, LDLC, DLDLP, VLDLC, VLDL, TGLX, TRIGL, TRIGP, TGLPOCT, CHHD, CHHDX  No results found for: TSH, TSH2, TSH3, TSHP, TSHEXT, TSHEXT  Lab Results   Component Value Date/Time    Hemoglobin A1c 6.1 03/27/2019 03:10 PM     No results found for: VITD3, XQVID2, XQVID3, XQVID, VD3RIA                Assessment and Plan:     1. Screening for malignant neoplasm of breast  - REGINALDO MAMMO BI SCREENING INCL CAD; Future    2. Essential hypertension  - increase avapro to 300mg daily and follow up in 4 weeks   - METABOLIC PANEL, COMPREHENSIVE  - CBC WITH AUTOMATED DIFF    3. High cholesterol  - LIPID PANEL  - irbesartan (AVAPRO) 300 mg tablet; Take 1 Tab by mouth nightly. Dispense: 30 Tab; Refill: 5      5. Cough suspect Gastroesophageal reflux disease without esophagitis - will start famotidine and see if cough improves   - METABOLIC PANEL, COMPREHENSIVE  - CBC WITH AUTOMATED DIFF    6. Encounter for hepatitis C screening test for low risk patient  - HEPATITIS C AB    7. Weight gain  Counseled on weight loss, healthy diet   - LIPID PANEL  - TSH AND FREE T4      Follow-up and Dispositions    · Return in about 4 weeks (around 8/20/2020) for pap smear and blood pressure check.           Darell Dalton MD

## 2020-08-12 ENCOUNTER — TELEPHONE (OUTPATIENT)
Dept: INTERNAL MEDICINE CLINIC | Age: 60
End: 2020-08-12

## 2020-08-12 NOTE — TELEPHONE ENCOUNTER
Patient states per Pharmacy her medication, Famotidine (PEPCID) 20 mg tablet is requiring a Prior Auth to be done thru patient's insurance. Please call if any questions or when done.  Thank you

## 2020-08-14 ENCOUNTER — TELEPHONE (OUTPATIENT)
Dept: INTERNAL MEDICINE CLINIC | Age: 60
End: 2020-08-14

## 2020-08-14 NOTE — TELEPHONE ENCOUNTER
Left message for patient that her prior authorization for famotidine has been approved. Approval good through 08/12/2021.   Patient advised to contact the office with any additional questions

## 2020-08-14 NOTE — TELEPHONE ENCOUNTER
Patient states she needs a call back to discuss how much Longer for Prior Auth to be approved for her Famotidine (PEPCID) 20 mg tablet that patient states she been trying to get this done for 2 weeks thru Pharmacy & they advised they were sending requests & we were not responding. Please call to discuss.  Thank you

## 2020-09-10 DIAGNOSIS — R05.9 COUGH: ICD-10-CM

## 2020-09-10 RX ORDER — FAMOTIDINE 20 MG/1
TABLET, FILM COATED ORAL
Qty: 60 TAB | Refills: 2 | Status: SHIPPED | OUTPATIENT
Start: 2020-09-10 | End: 2022-07-28 | Stop reason: SDUPTHER

## 2020-09-24 ENCOUNTER — HOSPITAL ENCOUNTER (OUTPATIENT)
Dept: CT IMAGING | Age: 60
Discharge: HOME | End: 2020-09-24
Attending: INTERNAL MEDICINE
Payer: COMMERCIAL

## 2020-09-24 DIAGNOSIS — R10.32 LEFT LOWER QUADRANT PAIN: ICD-10-CM

## 2020-09-24 DIAGNOSIS — K59.00 CONSTIPATION, UNSPECIFIED: ICD-10-CM

## 2020-09-24 DIAGNOSIS — E66.9 OBESITY, UNSPECIFIED: ICD-10-CM

## 2020-09-24 PROCEDURE — 74011000636 HC RX REV CODE- 636: Performed by: STUDENT IN AN ORGANIZED HEALTH CARE EDUCATION/TRAINING PROGRAM

## 2020-09-24 PROCEDURE — 74177 CT ABD & PELVIS W/CONTRAST: CPT

## 2020-09-24 RX ADMIN — IOPAMIDOL 100 ML: 755 INJECTION, SOLUTION INTRAVENOUS at 14:09

## 2020-12-20 DIAGNOSIS — I10 ESSENTIAL HYPERTENSION: ICD-10-CM

## 2020-12-20 RX ORDER — VERAPAMIL HYDROCHLORIDE 240 MG/1
TABLET, FILM COATED, EXTENDED RELEASE ORAL
Qty: 90 TAB | Refills: 1 | Status: SHIPPED | OUTPATIENT
Start: 2020-12-20 | End: 2021-04-12

## 2021-03-08 ENCOUNTER — TELEPHONE (OUTPATIENT)
Dept: INTERNAL MEDICINE CLINIC | Age: 61
End: 2021-03-08

## 2021-03-08 DIAGNOSIS — I10 ESSENTIAL HYPERTENSION: ICD-10-CM

## 2021-03-08 DIAGNOSIS — E78.00 HIGH CHOLESTEROL: ICD-10-CM

## 2021-03-08 NOTE — TELEPHONE ENCOUNTER
Future Appointments:  No future appointments.      Last Appointment With Me:  Visit date not found     Requested Prescriptions     Pending Prescriptions Disp Refills    hydroCHLOROthiazide (HYDRODIURIL) 50 mg tablet [Pharmacy Med Name: HYDROCHLOROTHIAZIDE 50 MG TAB] 90 Tab 0     Sig: TAKE 1 TABLET BY MOUTH EVERY DAY    irbesartan (AVAPRO) 300 mg tablet [Pharmacy Med Name: IRBESARTAN 300 MG TABLET] 90 Tab 0     Sig: TAKE 1 TABLET BY MOUTH EVERY DAY AT NIGHT

## 2021-03-08 NOTE — TELEPHONE ENCOUNTER
Patient called in stating she went to the ER today 3/8/21 [facility] and was advised to talk with PCP.  She states her recent BP check was 189/99

## 2021-03-09 RX ORDER — HYDROCHLOROTHIAZIDE 50 MG/1
TABLET ORAL
Qty: 90 TAB | Refills: 0 | Status: SHIPPED | OUTPATIENT
Start: 2021-03-09 | End: 2021-07-01 | Stop reason: SDUPTHER

## 2021-03-09 RX ORDER — IRBESARTAN 300 MG/1
TABLET ORAL
Qty: 90 TAB | Refills: 0 | Status: SHIPPED | OUTPATIENT
Start: 2021-03-09 | End: 2021-11-28

## 2021-03-18 ENCOUNTER — VIRTUAL VISIT (OUTPATIENT)
Dept: INTERNAL MEDICINE CLINIC | Age: 61
End: 2021-03-18
Payer: COMMERCIAL

## 2021-03-18 DIAGNOSIS — I10 ESSENTIAL HYPERTENSION: Primary | ICD-10-CM

## 2021-03-18 DIAGNOSIS — Z13.220 SCREENING CHOLESTEROL LEVEL: ICD-10-CM

## 2021-03-18 DIAGNOSIS — G89.29 CHRONIC BILATERAL LOW BACK PAIN WITH RIGHT-SIDED SCIATICA: ICD-10-CM

## 2021-03-18 DIAGNOSIS — I10 HTN (HYPERTENSION), MALIGNANT: ICD-10-CM

## 2021-03-18 DIAGNOSIS — E78.00 HIGH CHOLESTEROL: ICD-10-CM

## 2021-03-18 DIAGNOSIS — M54.41 CHRONIC BILATERAL LOW BACK PAIN WITH RIGHT-SIDED SCIATICA: ICD-10-CM

## 2021-03-18 DIAGNOSIS — R63.5 WEIGHT GAIN: ICD-10-CM

## 2021-03-18 PROCEDURE — 99214 OFFICE O/P EST MOD 30 MIN: CPT | Performed by: INTERNAL MEDICINE

## 2021-03-18 NOTE — PROGRESS NOTES
CC: Hypertension and Abnormal Lab Results (possible hernia - slip disk )      HPI:    She is a 64 y.o. female who presents for evaluation of Hypertension     Patient reports BP has been higher 180 range  Reports taking HCTZ 50mg, avapro 300mg, verapamil 240mg     Patient has been seen at ER at [facility] and told she has herniated disk in her back  Having increased pain in R hip and radiated down to the groin    Works at [employer] and stands all day feels that she cannot work due to pain     Being evaluated for hernia and will have a CT scan tomorrow at [address] [place] [address]        She denies CP, dyspnea, polyuria and increased thirst       End of February had COVID positive Feb 23rd asymptomatic    Having severe knee pain and hip pain and stopped working due to pain , has a work note to return next Tuesday \" I dont think I can stand due to pain in back hip and knee\" \" can you extend my time off from work. This is an established visit conducted via telemedicine with video. The patient has been instructed that this meets HIPAA criteria and acknowledges and agrees to this method of visitation. Pursuant to the emergency declaration under the [facility], [facility] waiver authority and the [employer] Act, this Virtual Visit was conducted, with patient's consent, to reduce the patient's risk of exposure to COVID-19 and provide continuity of care for an established patient. Services were provided through a video synchronous discussion virtually to substitute for in-person clinic visit.        ROS:  Constitutional: negative for fevers, chills, anorexia and weight loss  10 systems reviewed and negative other than HPI    Past Medical History:   Diagnosis Date    Chronic constipation     Hypertension     Obesity, Class II, BMI 35-39.9     Pre-diabetes 03/28/2019    A1C- 6.1    PVC (premature ventricular contraction)     States never been to cardio- has had for many years       Current Outpatient Medications on File Prior to Visit   Medication Sig Dispense Refill    hydroCHLOROthiazide (HYDRODIURIL) 50 mg tablet TAKE 1 TABLET BY MOUTH EVERY DAY 90 Tab 0    irbesartan (AVAPRO) 300 mg tablet TAKE 1 TABLET BY MOUTH EVERY DAY AT NIGHT 90 Tab 0    verapamil ER (CALAN-SR) 240 mg CR tablet TAKE 1 TABLET BY MOUTH EVERY DAY EVERY NIGHT 90 Tab 1    famotidine (PEPCID) 20 mg tablet TAKE 1 TABLET BY MOUTH TWICE A DAY 60 Tab 2    acetaminophen (TYLENOL) 650 mg TbER Take 650 mg by mouth every eight (8) hours. Indications: Pain associated with Arthritis      cannabidiol, CBD, extract 100 mg/mL soln Take  by mouth.  COD LIVER OIL, BULK, by Does Not Apply route.  OTHER ginkgo biloba      OTHER Black seed oil      glucosamine/chondr brown A sod (GLUCOSAMINE-CHONDROITIN) 750-600 mg tab Take 2 Tabs by mouth. No current facility-administered medications on file prior to visit.         Past Surgical History:   Procedure Laterality Date    HX  SECTION N/A     x 3    HX COLONOSCOPY      HX TONSILLECTOMY      HX WISDOM TEETH EXTRACTION Bilateral        Family History   Problem Relation Age of Onset    Diabetes Mother      Reviewed and no changes     Social History     Socioeconomic History    Marital status:      Spouse name: Not on file    Number of children: Not on file    Years of education: Not on file    Highest education level: Not on file   Occupational History    Not on file   Social Needs    Financial resource strain: Not on file    Food insecurity     Worry: Not on file     Inability: Not on file   [employer] needs     Medical: Not on file     Non-medical: Not on file   Tobacco Use    Smoking status: Never Smoker    Smokeless tobacco: Never Used   Substance and Sexual Activity    Alcohol use: Never     Frequency: Never    Drug use: Never    Sexual activity: Yes   Lifestyle    Physical activity     Days per week: Not on file     Minutes per session: Not on file    Stress: Not on file   Relationships    Social connections     Talks on phone: Not on file     Gets together: Not on file     Attends Gnosticism service: Not on file     Active member of club or organization: Not on file     Attends meetings of clubs or organizations: Not on file     Relationship status: Not on file    Intimate partner violence     Fear of current or ex partner: Not on file     Emotionally abused: Not on file     Physically abused: Not on file     Forced sexual activity: Not on file   Other Topics Concern    Not on file   Social History Narrative    Not on file          There were no vitals taken for this visit. Physical Examination:   Gen: well appearing female  HEENT: normal conjunctiva, no audible congestion, patient does not see oral erythema, has MMM  Neck: patient does not feel enlarged or tender LAD or masses  Resp: normal respiratory effort, no audible wheezing. CV: patient does not feel palpitations or heart irregularity  Abd: patient does not feel abdominal tenderness or mass, patient does not notice distension  Extrem: patient does not see swelling in ankles or joints.    Neuro: Alert and oriented, able to answer questions without difficulty, able to move all extremities and walk normally          Lab Results   Component Value Date/Time    WBC 4.2 03/27/2019 03:10 PM    HGB 8.8 (L) 04/15/2019 07:30 AM    HCT 38.1 03/27/2019 03:10 PM    PLATELET 631 52/81/1100 03:10 PM    MCV 88.0 03/27/2019 03:10 PM     Lab Results   Component Value Date/Time    Sodium 144 04/15/2019 07:30 AM    Potassium 3.0 (L) 04/15/2019 07:30 AM    Chloride 108 04/15/2019 07:30 AM    CO2 32 04/15/2019 07:30 AM    Anion gap 4 (L) 04/15/2019 07:30 AM    Glucose 92 04/15/2019 07:30 AM    BUN 10 04/15/2019 07:30 AM    Creatinine 0.54 (L) 04/15/2019 07:30 AM    BUN/Creatinine ratio 19 04/15/2019 07:30 AM    GFR est AA >60 04/15/2019 07:30 AM    GFR est non-AA >60 04/15/2019 07:30 AM    Calcium 8.5 04/15/2019 07:30 AM     No results found for: CHOL, CHOLPOCT, CHOLX, CHLST, CHOLV, HDL, HDLPOC, HDLP, LDL, LDLCPOC, LDLC, DLDLP, VLDLC, VLDL, TGLX, TRIGL, TRIGP, TGLPOCT, CHHD, CHHDX  No results found for: TSH, TSH2, TSH3, TSHP, TSHEXT, TSHEXT  No results found for: PSA, Mague Specter, [id number], [id number]  Lab Results   Component Value Date/Time    Hemoglobin A1c 6.1 03/27/2019 03:10 PM     No results found for: Acey Crease, VD3RIA    Lab Results   Component Value Date/Time    ALT (SGPT) 19 03/27/2019 03:10 PM    Alk. phosphatase 50 03/27/2019 03:10 PM    Bilirubin, total 0.4 03/27/2019 03:10 PM           Assessment/Plan:    1. Essential hypertension  Reports elevated BP despite taking  HCTZ 50mg, avapro 300mg, verapamil 240mg   Will check aldosterone level  - ALDOSTERONE  - ALDOSTERONE/RENIN RATIO  - METABOLIC PANEL, COMPREHENSIVE  - CBC WITH AUTOMATED DIFF    2. High cholesterol  - LIPID PANEL    3. Weight gain  - TSH AND FREE T4  - HEMOGLOBIN A1C W/O EAG      4. Screening cholesterol level    5 Chronic bilateral low back pain with right-sided sciatica  6. Knee pain   Reports severe with difficulty standing [facility] dont [facility] records, requested records    - REFERRAL TO ORTHOPEDICS  Patient is having difficulty with working due to pain   Discussed that will need ortho evaluation, patient to call with appointment date with ortho and then will write note for work      Follow-up and Dispositions    · Return in about 3 months (around 6/18/2021). Vickey Adams MD    This is an established visit conducted via real time video and audio telemedicine. The patient has been instructed that this meets HIPAA criteria and acknowledges and agrees to this method of visitation.

## 2021-03-18 NOTE — PROGRESS NOTES
This is an established visit conducted via telemedicine. The patient has been instructed that this meets HIPAA criteria and acknowledges and agrees to this method of visitation. Identified pt with two pt identifiers(name and ). Chief Complaint   Patient presents with    Hypertension    Abnormal Lab Results     possible hernia - slip disk        Health Maintenance Due   Topic Date Due    Hepatitis C Test  Never done    DTaP/Tdap/Td  (1 - Tdap) Never done    Pap Test  Never done    Cholesterol Test   Never done    Shingles Vaccine (1 of 2) Never done    Mammogram  Never done    Yearly Flu Vaccine (1) Never done       Ms. Roc Mcqueen has a reminder for a \"due or due soon\" health maintenance. I have asked that she discuss this further with her primary care provider for follow-up on this health maintenance.

## 2021-03-19 ENCOUNTER — TELEPHONE (OUTPATIENT)
Dept: INTERNAL MEDICINE CLINIC | Age: 61
End: 2021-03-19

## 2021-03-19 NOTE — TELEPHONE ENCOUNTER
----- Message from Aubrey Walker MD sent at 3/18/2021  2:35 PM EDT -----  Need records from [facility] ER with imaging

## 2021-03-23 ENCOUNTER — TELEPHONE (OUTPATIENT)
Dept: INTERNAL MEDICINE CLINIC | Age: 61
End: 2021-03-23

## 2021-03-23 DIAGNOSIS — M25.562 CHRONIC PAIN OF BOTH KNEES: Primary | ICD-10-CM

## 2021-03-23 DIAGNOSIS — G89.29 CHRONIC PAIN OF BOTH KNEES: Primary | ICD-10-CM

## 2021-03-23 DIAGNOSIS — M25.561 CHRONIC PAIN OF BOTH KNEES: Primary | ICD-10-CM

## 2021-03-23 NOTE — TELEPHONE ENCOUNTER
Patient states she needs a call back to get her Lab results from Blood Work done on Friday, 3/19/21. Patient also wants to requests getting Orthopedic Referral & to get a Pain Medication Prescription for her Back/Spine pain. Please call to discuss.  Thank you

## 2021-03-24 NOTE — TELEPHONE ENCOUNTER
MD Claudette oRdas LPN   Caller: Unspecified Garland Rockwell,  9:49 AM)             Yes patient was supposed to have everything that was ordered on that day - will have to return to lab, please apologize to patient      Left message for patient to return call

## 2021-03-26 NOTE — TELEPHONE ENCOUNTER
----- Message from [employer] sent at 3/26/2021  9:12 AM EDT -----  Regarding: /Telephone  General Message/Vendor Calls    Caller's first and last name: Pt       Reason for call: Pt state she missed a call on Tuesday and called back but never received a call again.  She would like to follow up on the bw she had done a week ago and also would like some pain meds for her hips       Callback required yes/no and why:yes- requested per pt       Best contact number(s): [phone]       Details to clarify the request: N/A       [employer]

## 2021-03-26 NOTE — TELEPHONE ENCOUNTER
Spoke with patient. Two pt identifiers confirmed. Patient advised that all of her labs were not drawn. Apologized to patient and asked if patient could please return to the lab. Patient agrees. Patient states that she went to see the orthopedic that Dr. Ezra Luke referred her too and she did not like him at all. Patient states that he refused to give her anything for pain, would not give her a note for work. Patient would like to know Dr. Ezra Luke can refer her to another ortho doctor and if she can give her a prescription for tramadol and a note for work. Advised patient that I will check with Dr. Ezra Luke and will give her a call back as soon as she responds. Pt verbalized understanding of information discussed w/ no further questions at this time.

## 2021-03-28 LAB
ALDOST SERPL-MCNC: 11.6 NG/DL (ref 0–30)
ALDOST/RENIN PLAS-RTO: 2 {RATIO} (ref 0–30)
RENIN PLAS-CCNC: 5.9 NG/ML/HR (ref 0.17–5.38)

## 2021-03-28 RX ORDER — TRAMADOL HYDROCHLORIDE 50 MG/1
50 TABLET ORAL
Qty: 30 TAB | Refills: 0 | Status: SHIPPED | OUTPATIENT
Start: 2021-03-28 | End: 2021-04-11

## 2021-03-29 ENCOUNTER — TELEPHONE (OUTPATIENT)
Dept: INTERNAL MEDICINE CLINIC | Age: 61
End: 2021-03-29

## 2021-03-29 DIAGNOSIS — G89.29 CHRONIC BILATERAL LOW BACK PAIN WITH RIGHT-SIDED SCIATICA: Primary | ICD-10-CM

## 2021-03-29 DIAGNOSIS — M54.41 CHRONIC BILATERAL LOW BACK PAIN WITH RIGHT-SIDED SCIATICA: Primary | ICD-10-CM

## 2021-03-29 DIAGNOSIS — M25.559 HIP PAIN: ICD-10-CM

## 2021-03-29 NOTE — TELEPHONE ENCOUNTER
[phone]  Pt states returning call and needs appt in office. She would like Nurse to call her back.

## 2021-03-29 NOTE — TELEPHONE ENCOUNTER
----- Message from Luci Irizarry sent at 3/29/2021  3:49 PM EDT -----  Regarding: Dr. Jaleesa Bee: [phone]  Patient return call    Caller's first and last name and relationship (if not the patient):pt      Best contact number(s):[phone]      Whose call is being returned:Lauren      Details to clarify the request: Returning call      Message from [facility]

## 2021-03-29 NOTE — LETTER
NOTIFICATION RETURN TO WORK / SCHOOL 
 
3/30/2021 3:27 PM 
 
Ms. Karina Fink 
[address] [facility] [facility] [address] [address] To Whom It May Concern: 
 
Thu Edmonds is currently under the care of [facility]. Please excuse patient from 03/31/2021 to 04/02/2021 If there are questions or concerns please have the patient contact our office. Sincerely, Allyn Cardenas MD

## 2021-03-29 NOTE — TELEPHONE ENCOUNTER
Kellen Chairez MD  [facility], LPN   Caller: Unspecified (6 days ago,  9:49 AM)             Short term tramadol - for pain sent in   What are the dates for the work note  need clarification - anything logner than 3 days will need to come from ortho / FMLA      Left message for patient to return call

## 2021-03-30 LAB
ALBUMIN SERPL-MCNC: 4.5 G/DL (ref 3.8–4.9)
ALBUMIN/GLOB SERPL: 1.8 {RATIO} (ref 1.2–2.2)
ALP SERPL-CCNC: 65 IU/L (ref 39–117)
ALT SERPL-CCNC: 18 IU/L (ref 0–32)
AST SERPL-CCNC: 17 IU/L (ref 0–40)
BASOPHILS # BLD AUTO: 0 X10E3/UL (ref 0–0.2)
BASOPHILS NFR BLD AUTO: 1 %
BILIRUB SERPL-MCNC: 0.2 MG/DL (ref 0–1.2)
BUN SERPL-MCNC: 15 MG/DL (ref 6–24)
BUN/CREAT SERPL: 19 (ref 9–23)
CALCIUM SERPL-MCNC: 9.6 MG/DL (ref 8.7–10.2)
CHLORIDE SERPL-SCNC: 102 MMOL/L (ref 96–106)
CHOLEST SERPL-MCNC: 186 MG/DL (ref 100–199)
CO2 SERPL-SCNC: 24 MMOL/L (ref 20–29)
CREAT SERPL-MCNC: 0.77 MG/DL (ref 0.57–1)
EOSINOPHIL # BLD AUTO: 0.1 X10E3/UL (ref 0–0.4)
EOSINOPHIL NFR BLD AUTO: 3 %
ERYTHROCYTE [DISTWIDTH] IN BLOOD BY AUTOMATED COUNT: 13.5 % (ref 11.7–15.4)
GLOBULIN SER CALC-MCNC: 2.5 G/DL (ref 1.5–4.5)
GLUCOSE SERPL-MCNC: 116 MG/DL (ref 65–99)
HCT VFR BLD AUTO: 35.2 % (ref 34–46.6)
HCV AB S/CO SERPL IA: <0.1 S/CO RATIO (ref 0–0.9)
HDLC SERPL-MCNC: 55 MG/DL
HGB BLD-MCNC: 11.8 G/DL (ref 11.1–15.9)
IMM GRANULOCYTES # BLD AUTO: 0 X10E3/UL (ref 0–0.1)
IMM GRANULOCYTES NFR BLD AUTO: 0 %
LDLC SERPL CALC-MCNC: 111 MG/DL (ref 0–99)
LYMPHOCYTES # BLD AUTO: 2 X10E3/UL (ref 0.7–3.1)
LYMPHOCYTES NFR BLD AUTO: 49 %
MCH RBC QN AUTO: 28.2 PG (ref 26.6–33)
MCHC RBC AUTO-ENTMCNC: 33.5 G/DL (ref 31.5–35.7)
MCV RBC AUTO: 84 FL (ref 79–97)
MONOCYTES # BLD AUTO: 0.3 X10E3/UL (ref 0.1–0.9)
MONOCYTES NFR BLD AUTO: 7 %
NEUTROPHILS # BLD AUTO: 1.6 X10E3/UL (ref 1.4–7)
NEUTROPHILS NFR BLD AUTO: 40 %
PLATELET # BLD AUTO: 277 X10E3/UL (ref 150–450)
POTASSIUM SERPL-SCNC: 3.9 MMOL/L (ref 3.5–5.2)
PROT SERPL-MCNC: 7 G/DL (ref 6–8.5)
RBC # BLD AUTO: 4.19 X10E6/UL (ref 3.77–5.28)
SODIUM SERPL-SCNC: 141 MMOL/L (ref 134–144)
T4 FREE SERPL-MCNC: 0.98 NG/DL (ref 0.82–1.77)
TRIGL SERPL-MCNC: 111 MG/DL (ref 0–149)
TSH SERPL DL<=0.005 MIU/L-ACNC: 2.78 UIU/ML (ref 0.45–4.5)
VLDLC SERPL CALC-MCNC: 20 MG/DL (ref 5–40)
WBC # BLD AUTO: 4.1 X10E3/UL (ref 3.4–10.8)

## 2021-03-30 NOTE — TELEPHONE ENCOUNTER
Spoke with patient. Two pt identifiers confirmed. Patient advised that a small prescription for tramadol  Patient states that she needs the note for work to cover 03/31/2021-04/02/2021. Patient states that she will return to work on 04/05/2021. Patient states that she is currently in PT for her hip, but she did not like the Orthopedic doctor that she was referred too, Dr. Sara Grayson. Patient would like to know if Dr. Rio Ochoa can refer her to someone else. Patient would like to see some one with [employer]. Advised patient that I will check with Dr. Rio Ochoa and I will give her a call back as soon as she responds. Pt verbalized understanding of information discussed w/ no further questions at this time.

## 2021-03-31 NOTE — TELEPHONE ENCOUNTER
MD Santiago Morales, LPN   Caller: Unspecified (2 days ago,  3:54 PM)             Referral to Dr Francisco Javier Banda with patient. Two pt identifiers confirmed. Patient advised that Dr. Fabiana Hooker has written her note for work. Patient asked that this be emailed to her at [email or web address]. Patient advised that Dr. Fabiana Hooker is referring her to Dr. Edi Shane. Patient has been provided with contact information. Pt verbalized understanding of information discussed w/ no further questions at this time.

## 2021-04-01 NOTE — PROGRESS NOTES
Normal kidney and liver function   Normal blood count  Normal thyroid  Cholesterol at goal for age  Keep up good work

## 2021-04-02 ENCOUNTER — PATIENT MESSAGE (OUTPATIENT)
Dept: INTERNAL MEDICINE CLINIC | Age: 61
End: 2021-04-02

## 2021-04-08 ENCOUNTER — TELEPHONE (OUTPATIENT)
Dept: INTERNAL MEDICINE CLINIC | Age: 61
End: 2021-04-08

## 2021-04-08 NOTE — TELEPHONE ENCOUNTER
#[phone] OR #[phone] please try both    Daughter, Teresita Wilcox will be faxing [place] documents today. Teresita Wilcox states these need to be done and turned into her place of employment on Monday, 4-12-21    Advised that I didn't know Dr. Bill Rosado turn around time on those type of documents, but would have you call her Friday, 4-9-21. Thanks.

## 2021-04-11 DIAGNOSIS — I10 ESSENTIAL HYPERTENSION: ICD-10-CM

## 2021-04-12 RX ORDER — VERAPAMIL HYDROCHLORIDE 240 MG/1
TABLET, FILM COATED, EXTENDED RELEASE ORAL
Qty: 90 TAB | Refills: 1 | Status: SHIPPED | OUTPATIENT
Start: 2021-04-12 | End: 2021-08-06

## 2021-04-12 NOTE — TELEPHONE ENCOUNTER
Patient states she needs a call back in reference to Letter needed Today for her Disability Deadline to be covered. Please call to discuss letter & documentation required. Please see Previous message. Patient states she thinks Letter is to show she was out per Dr. Sharan Giron from Wed., 3/24/21 thru Friday 4/2/21 Returning to Work on Monday, 4/5/21. Please call to Confirm date & be advised this getting done today.  Thank you

## 2021-04-12 NOTE — TELEPHONE ENCOUNTER
[phone]  [email or web address]    Pt states she only needs her excuse letters resent to her for work    She states there were 2 instances where the doctor excused her. She I was able to see the letter excusing her for March 31 thru April 2. But not another one. She states it would have been prior to March 31 incident. Pt states her they need the letters \"TODAY\" or they will not approve her disability. Pt states please send it  Today to her email. Please call pt if needed. [phone]  [email or web address]

## 2021-06-14 ENCOUNTER — TELEPHONE (OUTPATIENT)
Dept: INTERNAL MEDICINE CLINIC | Age: 61
End: 2021-06-14

## 2021-06-14 NOTE — TELEPHONE ENCOUNTER
Scheduled on 7/1 for a VV due to patients time preference.  Left detailed message providing date and time for appt and advised to please call back to confirm

## 2021-06-14 NOTE — TELEPHONE ENCOUNTER
---- Message -----  From: Alfred Bernard  Sent: 6/14/2021  11:29 AM EDT  To: [facility]  Subject: Dr. Apolinar Kramer                        Appointment not available    Caller's first and last name and relationship to patient (if not the patient): N/A      Best contact number: [phone]      Preferred date and time: Any day after 2:30pm       Scheduled appointment date and time: N/A      Reason for appointment: Knee Replacement Pre-Op Appt (scheduled for 7/20/21)      Details to clarify the request: PT's surgeon requested PT be seen 3-4 weeks prior to surgery      Guera Romero

## 2021-07-01 ENCOUNTER — VIRTUAL VISIT (OUTPATIENT)
Dept: INTERNAL MEDICINE CLINIC | Age: 61
End: 2021-07-01
Payer: COMMERCIAL

## 2021-07-01 DIAGNOSIS — Z01.818 PRE-OP EVALUATION: ICD-10-CM

## 2021-07-01 DIAGNOSIS — I10 ESSENTIAL HYPERTENSION: Primary | ICD-10-CM

## 2021-07-01 DIAGNOSIS — M17.0 PRIMARY OSTEOARTHRITIS OF BOTH KNEES: ICD-10-CM

## 2021-07-01 DIAGNOSIS — M54.31 BILATERAL SCIATICA: ICD-10-CM

## 2021-07-01 DIAGNOSIS — M54.32 BILATERAL SCIATICA: ICD-10-CM

## 2021-07-01 PROCEDURE — 99214 OFFICE O/P EST MOD 30 MIN: CPT | Performed by: INTERNAL MEDICINE

## 2021-07-01 RX ORDER — GABAPENTIN 300 MG/1
300 CAPSULE ORAL 3 TIMES DAILY
Qty: 90 CAPSULE | Refills: 1 | Status: SHIPPED | OUTPATIENT
Start: 2021-07-01 | End: 2021-12-02 | Stop reason: ALTCHOICE

## 2021-07-01 RX ORDER — HYDROCHLOROTHIAZIDE 50 MG/1
50 TABLET ORAL DAILY
Qty: 90 TABLET | Refills: 1 | Status: SHIPPED | OUTPATIENT
Start: 2021-07-01 | End: 2021-12-20

## 2021-07-01 NOTE — PROGRESS NOTES
CC: Pre-op Exam (total knee replacement scheduled for 07/20/2021)  R  Side     HPI:    She is a 63 y.o. female who presents for evaluation of pre op evaluation for R knee replacement  Patient is having sciatica nerve pain as well and requesting refill on gabapentin   Patient will have knee replacement on 07/20/2021      No chest pain , no dyspnea when doing chores in the house ( able to do 4 METS)     HTN - on 3 pills   HCTZ 50mg, verapamil 240mg daily and avapro 300mg daily  BP not monitoring at home    No hx of stroke, heart attack or diabetes         This is an established visit conducted via telemedicine with video. The patient has been instructed that this meets HIPAA criteria and acknowledges and agrees to this method of visitation. Pursuant to the emergency declaration under the [address] [place] [address], [facility] waiver authority and the [employer] Act, this Virtual Visit was conducted, with patient's consent, to reduce the patient's risk of exposure to COVID-19 and provide continuity of care for an established patient. Services were provided through a video synchronous discussion virtually to substitute for in-person clinic visit. ROS:  Constitutional: negative for fevers, chills, anorexia and weight loss  Eyes:   negative for visual disturbance,  irritation  ENT:   negative for tinnitus,sore throat,nasal congestion,ear pain, sinus pain.    Respiratory:  negative for cough, hemoptysis, dyspnea,wheezing  CV:   negative for chest pain, palpitations, lower extremity edema  GI:   negative for nausea, vomiting, diarrhea, abdominal pain,melena  Genitourinary: negative for frequency, dysuria, hematuria  Musculoskel: See HPI  Neurological:  negative for headaches, dizziness, focal weakness, numbness  Psych:             Negative for depression and anxiety    Past Medical History:   Diagnosis Date    Chronic constipation     Hypertension     Obesity, Class II, BMI 35-39.9     Pre-diabetes 2019    A1C- 6.1    PVC (premature ventricular contraction)     States never been to cardio- has had for many years       Current Outpatient Medications on File Prior to Visit   Medication Sig Dispense Refill    verapamil ER (CALAN-SR) 240 mg CR tablet TAKE 1 TABLET BY MOUTH EVERY DAY EVERY NIGHT 90 Tab 1    irbesartan (AVAPRO) 300 mg tablet TAKE 1 TABLET BY MOUTH EVERY DAY AT NIGHT 90 Tab 0    famotidine (PEPCID) 20 mg tablet TAKE 1 TABLET BY MOUTH TWICE A DAY 60 Tab 2    acetaminophen (TYLENOL) 650 mg TbER Take 650 mg by mouth every eight (8) hours. Indications: Pain associated with Arthritis      cannabidiol, CBD, extract 100 mg/mL soln Take  by mouth.  COD LIVER OIL, BULK, by Does Not Apply route.  glucosamine/chondr brown A sod (GLUCOSAMINE-CHONDROITIN) 750-600 mg tab Take 2 Tabs by mouth.  OTHER ginkgo biloba (Patient not taking: Reported on 2021)      OTHER Black seed oil (Patient not taking: Reported on 2021)       No current facility-administered medications on file prior to visit.        Past Surgical History:   Procedure Laterality Date    HX  SECTION N/A     x 3    HX COLONOSCOPY      HX TONSILLECTOMY      HX WISDOM TEETH EXTRACTION Bilateral        Family History   Problem Relation Age of Onset    Diabetes Mother      Reviewed and no changes     Social History     Socioeconomic History    Marital status:      Spouse name: Not on file    Number of children: Not on file    Years of education: Not on file    Highest education level: Not on file   Occupational History    Not on file   Tobacco Use    Smoking status: Never Smoker    Smokeless tobacco: Never Used   Vaping Use    Vaping Use: Never used   Substance and Sexual Activity    Alcohol use: Never    Drug use: Never    Sexual activity: Yes   Other Topics Concern    Not on file   Social History Narrative    Not on file Social Determinants of Health     Financial Resource Strain:     Difficulty of Paying Living Expenses:    Food Insecurity:     Worried About Running Out of Food in the Last Year:     [address] in the Last Year:    Transportation Needs:     Lack of Transportation (Medical):  Lack of Transportation (Non-Medical):    Physical Activity:     Days of Exercise per Week:     Minutes of Exercise per Session:    Stress:     Feeling of Stress :    Social Connections:     Frequency of Communication with Friends and Family:     Frequency of Social Gatherings with Friends and Family:     Attends Yazidism Services:     Active Member of Clubs or Organizations:     Attends Club or Organization Meetings:     Marital Status:    Intimate Partner Violence:     Fear of Current or Ex-Partner:     Emotionally Abused:     Physically Abused:     Sexually Abused: There were no vitals taken for this visit. Physical Examination:   Gen: well appearing female  HEENT: normal conjunctiva, no audible congestion, patient does not see oral erythema, has MMM  Neck: patient does not feel enlarged or tender LAD or masses  Resp: normal respiratory effort, no audible wheezing. CV: patient does not feel palpitations or heart irregularity  Abd: patient does not feel abdominal tenderness or mass, patient does not notice distension  Extrem: patient does not see swelling in ankles or joints.    Neuro: Alert and oriented, able to answer questions without difficulty, able to move all extremities and walk normally          Lab Results   Component Value Date/Time    WBC 4.1 03/29/2021 12:07 PM    HGB 11.8 03/29/2021 12:07 PM    HCT 35.2 03/29/2021 12:07 PM    PLATELET 274 82/10/7879 12:07 PM    MCV 84 03/29/2021 12:07 PM     Lab Results   Component Value Date/Time    Sodium 141 03/29/2021 12:07 PM    Potassium 3.9 03/29/2021 12:07 PM    Chloride 102 03/29/2021 12:07 PM    CO2 24 03/29/2021 12:07 PM    Anion gap 4 (L) 04/15/2019 07:30 AM    Glucose 116 (H) 03/29/2021 12:07 PM    BUN 15 03/29/2021 12:07 PM    Creatinine 0.77 03/29/2021 12:07 PM    BUN/Creatinine ratio 19 03/29/2021 12:07 PM    GFR est AA 98 03/29/2021 12:07 PM    GFR est non-AA 85 03/29/2021 12:07 PM    Calcium 9.6 03/29/2021 12:07 PM     Lab Results   Component Value Date/Time    Cholesterol, total 186 03/29/2021 12:07 PM    HDL Cholesterol 55 03/29/2021 12:07 PM    LDL, calculated 111 (H) 03/29/2021 12:07 PM    VLDL, calculated 20 03/29/2021 12:07 PM    Triglyceride 111 03/29/2021 12:07 PM     Lab Results   Component Value Date/Time    TSH 2.780 03/29/2021 12:07 PM     No results found for: PSA, Debe Nett, [id number], [id number]  Lab Results   Component Value Date/Time    Hemoglobin A1c 6.1 03/27/2019 03:10 PM     No results found for: VITD3, XQVID2, XQVID3, XQVID, VD3RIA    Lab Results   Component Value Date/Time    ALT (SGPT) 18 03/29/2021 12:07 PM    Alk. phosphatase 65 03/29/2021 12:07 PM    Bilirubin, total 0.2 03/29/2021 12:07 PM           Assessment/Plan:    1. Essential hypertension  - reminded to monitor BP and will come to the office for BP check next week on Tuesday   - hydroCHLOROthiazide (HYDRODIURIL) 50 mg tablet; Take 1 Tablet by mouth daily. Dispense: 90 Tablet; Refill: 1    2. Bilateral sciatica  - gabapentin (NEURONTIN) 300 mg capsule; Take 1 Capsule by mouth three (3) times daily. Max Daily Amount: 900 mg. Dispense: 90 Capsule; Refill: 1    3. Primary osteoarthritis of both knees  Planning to have R knee replacement    4. Pre op-    Pt is low-intermediate risk for a low-intermediate risk surg with adequate functional mets, she may proceed with surg without additional cardiac w/u      Reminded to schedule pap smear         Stefanie Bower MD    This is an established visit conducted via real time video and audio telemedicine.   The patient has been instructed that this meets HIPAA criteria and acknowledges and agrees to this method of visitation.

## 2021-07-01 NOTE — PROGRESS NOTES
Identified pt with two pt identifiers(name and ). Reviewed record in preparation for visit and have obtained necessary documentation. Chief Complaint   Patient presents with    Pre-op Exam     total knee replacement scheduled for 2021        Health Maintenance Due   Topic    COVID-19 Vaccine (1)    DTaP/Tdap/Td series (1 - Tdap)    PAP AKA CERVICAL CYTOLOGY     Shingrix Vaccine Age 50> (1 of 2)    Breast Cancer Screen Mammogram       There were no vitals taken for this visit. Pain Scale: /10    Coordination of Care Questionnaire:  :   1. Have you been to the ER, urgent care clinic since your last visit? Hospitalized since your last visit? No    2. Have you seen or consulted any other health care providers outside of the [address] [place] [address] since your last visit?   Include any pap smears or colon screening. yes, antonio for knee

## 2021-07-06 ENCOUNTER — CLINICAL SUPPORT (OUTPATIENT)
Dept: INTERNAL MEDICINE CLINIC | Age: 61
End: 2021-07-06

## 2021-07-06 VITALS — SYSTOLIC BLOOD PRESSURE: 139 MMHG | DIASTOLIC BLOOD PRESSURE: 80 MMHG

## 2021-07-06 DIAGNOSIS — Z01.30 BP CHECK: Primary | ICD-10-CM

## 2021-07-12 ENCOUNTER — HOSPITAL ENCOUNTER (OUTPATIENT)
Dept: PREADMISSION TESTING | Age: 61
Discharge: HOME | End: 2021-07-12
Payer: COMMERCIAL

## 2021-07-12 VITALS
DIASTOLIC BLOOD PRESSURE: 79 MMHG | HEART RATE: 61 BPM | SYSTOLIC BLOOD PRESSURE: 137 MMHG | BODY MASS INDEX: 44.53 KG/M2 | HEIGHT: 64 IN | WEIGHT: 260.8 LBS | TEMPERATURE: 97.9 F

## 2021-07-12 LAB
ABO + RH BLD: NORMAL
ANION GAP SERPL CALC-SCNC: 6 MMOL/L (ref 5–15)
APPEARANCE UR: CLEAR
BACTERIA URNS QL MICRO: ABNORMAL /HPF
BILIRUB UR QL: NEGATIVE
BLOOD GROUP ANTIBODIES SERPL: NORMAL
BUN SERPL-MCNC: 18 MG/DL (ref 6–20)
BUN/CREAT SERPL: 27 (ref 12–20)
CALCIUM SERPL-MCNC: 9.7 MG/DL (ref 8.5–10.1)
CHLORIDE SERPL-SCNC: 105 MMOL/L (ref 97–108)
CO2 SERPL-SCNC: 30 MMOL/L (ref 21–32)
COLOR UR: ABNORMAL
CREAT SERPL-MCNC: 0.67 MG/DL (ref 0.55–1.02)
EPITH CASTS URNS QL MICRO: ABNORMAL /LPF
ERYTHROCYTE [DISTWIDTH] IN BLOOD BY AUTOMATED COUNT: 15.8 % (ref 11.5–14.5)
EST. AVERAGE GLUCOSE BLD GHB EST-MCNC: 131 MG/DL
GLUCOSE SERPL-MCNC: 93 MG/DL (ref 65–100)
GLUCOSE UR STRIP.AUTO-MCNC: NEGATIVE MG/DL
HBA1C MFR BLD: 6.2 % (ref 4–5.6)
HCT VFR BLD AUTO: 35.8 % (ref 35–47)
HGB BLD-MCNC: 11.4 G/DL (ref 11.5–16)
HGB UR QL STRIP: NEGATIVE
HYALINE CASTS URNS QL MICRO: ABNORMAL /LPF (ref 0–5)
INR PPP: 1 (ref 0.9–1.1)
KETONES UR QL STRIP.AUTO: NEGATIVE MG/DL
LEUKOCYTE ESTERASE UR QL STRIP.AUTO: NEGATIVE
MCH RBC QN AUTO: 27.9 PG (ref 26–34)
MCHC RBC AUTO-ENTMCNC: 31.8 G/DL (ref 30–36.5)
MCV RBC AUTO: 87.7 FL (ref 80–99)
NITRITE UR QL STRIP.AUTO: NEGATIVE
NRBC # BLD: 0 K/UL (ref 0–0.01)
NRBC BLD-RTO: 0 PER 100 WBC
PH UR STRIP: 6 [PH] (ref 5–8)
PLATELET # BLD AUTO: 244 K/UL (ref 150–400)
PMV BLD AUTO: 11.5 FL (ref 8.9–12.9)
POTASSIUM SERPL-SCNC: 3.4 MMOL/L (ref 3.5–5.1)
PROT UR STRIP-MCNC: NEGATIVE MG/DL
PROTHROMBIN TIME: 10.2 SEC (ref 9–11.1)
RBC # BLD AUTO: 4.08 M/UL (ref 3.8–5.2)
RBC #/AREA URNS HPF: ABNORMAL /HPF (ref 0–5)
SODIUM SERPL-SCNC: 141 MMOL/L (ref 136–145)
SP GR UR REFRACTOMETRY: 1.01 (ref 1–1.03)
SPECIMEN EXP DATE BLD: NORMAL
UA: UC IF INDICATED,UAUC: ABNORMAL
UROBILINOGEN UR QL STRIP.AUTO: 0.2 EU/DL (ref 0.2–1)
WBC # BLD AUTO: 5 K/UL (ref 3.6–11)
WBC URNS QL MICRO: ABNORMAL /HPF (ref 0–4)

## 2021-07-12 PROCEDURE — 85027 COMPLETE CBC AUTOMATED: CPT

## 2021-07-12 PROCEDURE — 85610 PROTHROMBIN TIME: CPT

## 2021-07-12 PROCEDURE — 83036 HEMOGLOBIN GLYCOSYLATED A1C: CPT

## 2021-07-12 PROCEDURE — 81001 URINALYSIS AUTO W/SCOPE: CPT

## 2021-07-12 PROCEDURE — 80048 BASIC METABOLIC PNL TOTAL CA: CPT

## 2021-07-12 PROCEDURE — 93005 ELECTROCARDIOGRAM TRACING: CPT

## 2021-07-12 PROCEDURE — 36415 COLL VENOUS BLD VENIPUNCTURE: CPT

## 2021-07-12 PROCEDURE — 86901 BLOOD TYPING SEROLOGIC RH(D): CPT

## 2021-07-12 RX ORDER — IBUPROFEN 200 MG
800 TABLET ORAL
COMMUNITY
End: 2021-07-22

## 2021-07-12 RX ORDER — DICLOFENAC SODIUM 50 MG/1
50 TABLET, DELAYED RELEASE ORAL 2 TIMES DAILY
COMMUNITY
End: 2021-07-22

## 2021-07-12 RX ORDER — TRAMADOL HYDROCHLORIDE 100 MG/1
100 TABLET, EXTENDED RELEASE ORAL AS NEEDED
COMMUNITY
End: 2021-07-22

## 2021-07-12 RX ORDER — DICLOFENAC SODIUM 10 MG/G
2 GEL TOPICAL
COMMUNITY
End: 2021-07-22

## 2021-07-12 NOTE — PERIOP NOTES
PATIENT GIVEN SURGICAL SITE INFECTION FAQ HANDOUT AND HAND WASHING TIP SHEET. PREOP INSTRUCTIONS REVIEWED AND PATIENT VERBALIZES UNDERSTANDING OF INSTRUCTIONS. PATIENT HAS BEEN GIVEN THE OPPORTUNITY TO ASK ADDITIONAL QUESTIONS. GAVE PATIENT 2 BOTTLES OF CHG CLEANSER AND INSTRUCTIONS. PATIENT VERBALIZED UNDERSTANDING. PATIENT CALLED AND MADE AWARE OF COVID-19 TESTING NEEDED TO BE DONE WITHIN 72 HOURS OF SURGERY. COVID-19 TESTING APPOINTMENT  MADE FOR PATIENT VIA PRE ADMISSION TESTING DEPARTMENT. PATIENT INSTRUCTED ON SELF QUARANTINE BETWEEN TESTING AND ARRIVAL TIME ON DAY OF SURGERY. PATIENT HAS WALKER SHE PURCHASED FOR AFTER SURGERY AND WILL BRING DOS. PATIENT WAS TOLD BY SURGEONS OFFICE SHE DID NOT NEED TO ATTEND CLASS BUT WAS PROVIDED INSTRUCTIONS IF SHE WOULD LIKE TO VIEW THE KNEE REPLACEMENT CLASS ONLINE.

## 2021-07-12 NOTE — H&P
Marcie Haro  : 1960   / Language: English / Race: Black or   Female      History of Present Illness   The patient is a 63year old female who presents to the practice today for a transition into care. Additional reasons for visit:    Knee Pain is described as the following: The onset of the knee pain has been gradual. The knee pain is severe. The knee pain is characterized as a dull aching. The knee pain is described as being located in the entire knee (right). Previous evalutations include emergency room. Note for \"Knee Pain\": Severe end-stage DJD right knee. Patient is on crutches due to her knee pain. She is status post left total knee. She loves her left knee. She states that she cannot function walk or live the way she is with her right knee. Been to the emergency room. She takes medications for it including Tylenol and anti-inflammatories. She wants to get her knee replaced. Symptoms for over 2 years. Slowly progressive to the point that she cannot really function. Allergies  No Known Allergies   [2021]:  No Known Drug Allergies   [2021]: Allergies Reconciled      Family History  Arthritis   Father, Sister. Social History  Caffeine use   Coffee, Occasionally, Tea. Current work status   Full-time. Exercise   Inactive. Marital status   . No alcohol use    No drug use    Seat Belt Use   Always uses seat belts. Sun Exposure   Rarely. Tobacco / smoke exposure   None. Tobacco use   Never smoker. Medication History   hydroCHLOROthiazide  (12.5MG Tablet, Oral) Active. Irbesartan  (75MG Tablet, Oral) Active. Medications Reconciled     Pregnancy / Birth History   Pregnant   No.    Other Problems  Arthritis    High blood pressure          Review of Systems   General Not Present- Appetite Loss, Chills, Fatigue, Fever, HIV Exposure, Night Sweats, Persistent Infections, Seasonal Allergies, Weight Gain and Weight Loss.   Skin Not Present- Itching, Nail Changes, Poor Wound Healing, Rash, Skin Color Changes, Suspicious Lesions and Yellowish Skin Color. HEENT Not Present- Decreased Hearing, Double Vision, Earache, Hoarseness, Jaundice/Yellow Eyes, Loose Teeth, Nose Bleed, Ringing in the Ears and Sore Throat. Respiratory Not Present- Bloody sputum, Chronic Cough, Difficulty Breathing, Snoring, Wakes up from Sleep Wheezing or Short of Breath and Wheezing. Cardiovascular Present- Swelling of Extremities. Not Present- Bluish Discoloration Of Lips Or Nails, Chest Pain, Difficulty Breathing Lying Down, Difficulty Breathing On Exertion, Leg Cramps With Exertion and Palpitations. Gastrointestinal Not Present- Abdominal Pain, Black, Tarry Stool, Change in Bowel Habits, Cirrhosis, Constipation, Diarrhea, Difficulty Swallowing, Nausea and Vomiting. Female Genitourinary Not Present- Blood in Urine, Frequency, Painful Urination, Pelvic Pain, Trouble Starting Urinary Stream and Urgency. Musculoskeletal Present- Back Pain, Joint Pain, Joint Stiffness and Joint Swelling. Neurological Not Present- Fainting, Headaches, Memory Loss, Numbness, Seizures, Tingling, Tremor, Unsteadiness and Weakness. Psychiatric Not Present- Anxiety, Bipolar and Depression. Endocrine Not Present- Cold Intolerance, Excessive Hunger, Excessive Thirst, Excessive Urination and Heat Intolerance. Hematology Not Present- Abnormal Bruising , Enlarged Lymph Nodes, Excessive bleeding and Skin Discoloration. Vitals  Weight: 257 lb   Height: 64 in   Weight was reported by patient. Height was reported by patient.   Body Surface Area: 2.18 m²   Body Mass Index: 44.11 kg/m²        Physical Exam   Musculoskeletal  Global Assessment  Examination of related systems reveals - well-developed, well-nourished, in no acute distress, alert and oriented x 3, no rashes or ulcers of bilateral upper and lower extremities, head or trunk, no generalized swelling or edema of extremities, no digital clubbing or cyanosis, neurovascularly intact globally with normal deep tendon reflexes and normal coordination. Gait and Station - normal posture. Right Lower Extremity - Note: Hip was examined and has painless range of motion with negative impingement and apprehension sign. Straight leg raise and femoral nerve stretch test are negative. Lower extremity has palpable dorsalis pedis pulse. Skin is intact and foot is sensate and well-perfused. Fixed varus malalignment of 10 degrees. Range of motion is 5 to 115 degrees. No instability. Motor testing reveals 5 out of 5 strength of the hip flexors, quads, ankle plantar flexors, and ankle dorsiflexors. Assessment & Plan     Primary osteoarthritis of right knee (715.16  M17.11)  Impression: End-stage degenerative joint disease. Longstanding history of symptoms. Increasing dysfunction. Options were discussed. She is basically walking on crutches every day now. Scheduled right total knee. Robotic assisted. Current Plans  Pt Education - How to [address] using Patient Portal and 3rd Party Apps: discussed with patient and provided information. Pt Education - Educational materials were provided.: discussed with patient and provided information. X-RAY EXAM OF KNEE 3 VIEWS (34722) (Bilateral Standing PA flex, Right Lateral and bilateral [place] Patella views were taken today using Digital Radiography. 3 views right.  Subluxation, bone-on-bone medial, cysts, osteophytes, bony erosion.)    REVIEW OF SYSTEMS: Systems were reviewed by the provider.(V49.9)      Weight above 97th percentile (V49.89  Z78.9)    Current Plans  LIFESTYLE EDUCATION REGARDING DIET (30512)

## 2021-07-13 LAB
ATRIAL RATE: 53 BPM
BACTERIA SPEC CULT: NORMAL
BACTERIA SPEC CULT: NORMAL
CALCULATED P AXIS, ECG09: 60 DEGREES
CALCULATED R AXIS, ECG10: 28 DEGREES
CALCULATED T AXIS, ECG11: 18 DEGREES
DIAGNOSIS, 93000: NORMAL
P-R INTERVAL, ECG05: 160 MS
Q-T INTERVAL, ECG07: 458 MS
QRS DURATION, ECG06: 92 MS
QTC CALCULATION (BEZET), ECG08: 429 MS
SERVICE CMNT-IMP: NORMAL
VENTRICULAR RATE, ECG03: 53 BPM

## 2021-07-13 NOTE — PERIOP NOTES
PAT Nurse Practitioner   Pre-Operative Chart Review/Assessment:-ORTHOPEDIC                Patient Name:  Cassie Farias                                                           Age:   63 y.o.    :  1960     Today's Date:  2021     Date of PAT:   2021      Date of Surgery:    2021      Procedure(s):  Right Total Knee Arthroplasty     Surgeon:   Dr. Neal Orellana                       PLAN:      1)  Medical Clearance:  Dr. Gold Jackson      2)  Cardiac Clearance:  EKG and METs reviewed. No further cardiac testing requested. 3)  Diabetic Treatment Consult:  Not indicated. A1c-6.2      4)  Sleep Apnea evaluation:   RAFIQ score of 4. Pt reports a diagnosis of HTN. Pt denies loud snoring that can be heard through a closed door, daytime fatigue and witnessed pauses in breathing. Referral not indicated. 5) Treatment for MRSA/Staph Aureus:  Neg      6) Additional Concerns:  HTN, prediabetes, obesity                Vital Signs:         Vitals:    21 1137   BP: 137/79   Pulse: 61   Temp: 97.9 °F (36.6 °C)   Weight: 118.3 kg (260 lb 12.9 oz)   Height: 5' 4\" (1.626 m)            ____________________________________________  PAST MEDICAL HISTORY  Past Medical History:   Diagnosis Date    Chronic constipation     Hypertension     Obesity, Class II, BMI 35-39.9     Pre-diabetes 2019    A1C- 6.1    PVC (premature ventricular contraction)     States never been to cardio- has had for many years      ____________________________________________  PAST SURGICAL HISTORY  Past Surgical History:   Procedure Laterality Date    HX  SECTION N/A     x 3    HX COLONOSCOPY      HX KNEE REPLACEMENT Left 2019    HX TONSILLECTOMY      HX WISDOM TEETH EXTRACTION Bilateral       ____________________________________________  HOME MEDICATIONS  Current Outpatient Medications   Medication Sig    traMADoL (ULTRAM-ER) 100 mg Tb24 Take 100 mg by mouth as needed.     ibuprofen (Motrin IB) 200 mg tablet Take 800 mg by mouth every six (6) hours as needed for Pain.  diclofenac EC (VOLTAREN) 50 mg EC tablet Take 50 mg by mouth two (2) times a day.  diclofenac (VOLTAREN) 1 % gel Apply 2 g to affected area nightly.  inulin/cholecalciferol, D3, (FIBER GUMMIES WITH VITAMIN D3 PO) Take 2 Tablets by mouth daily.  OTHER ANDREW GUMMIES 2 TABS DAILY    OTHER COMPLETE MULTI, DAILY LIVER SUPPORT, 2 TABS DAILY    OTHER PERFECT AMINO , 8 ESSENTIAL AMINO ACIDS, 5 TABS DAILY    OTHER IMMUPLEX 3 TABS DAILY    OTHER two (2) times a day. BONE HEALTH DAILY FUNDAMENTALS ( CONTAINS 7 TABS, 3 ARE CHEWABLE, 4 ARE WITH WATER) ,PATIENT TAKES ALL 7 , TWICE DAILY    psyllium husk/aspartame (DAILY FIBER, PSYLLIUM-ASPART, PO) Take  by mouth. 5 TSP WITH 8 OUNCES OF WATER DAILY    OTHER VITAFUSION WOMEN, ENERGY, METABOLISM AND SUPPORT, 3 GUMMIES DAILY    gabapentin (NEURONTIN) 300 mg capsule Take 1 Capsule by mouth three (3) times daily. Max Daily Amount: 900 mg.  hydroCHLOROthiazide (HYDRODIURIL) 50 mg tablet Take 1 Tablet by mouth daily.  verapamil ER (CALAN-SR) 240 mg CR tablet TAKE 1 TABLET BY MOUTH EVERY DAY EVERY NIGHT (Patient taking differently: Take 240 mg by mouth daily. TAKE 1 TABLET BY MOUTH EVERY DAY EVERY NIGHT)    irbesartan (AVAPRO) 300 mg tablet TAKE 1 TABLET BY MOUTH EVERY DAY AT NIGHT (Patient taking differently: Take 300 mg by mouth daily.)    famotidine (PEPCID) 20 mg tablet TAKE 1 TABLET BY MOUTH TWICE A DAY (Patient taking differently: Take 20 mg by mouth as needed.)    acetaminophen (TYLENOL) 650 mg TbER Take 650 mg by mouth every eight (8) hours. Indications: Pain associated with Arthritis    COD LIVER OIL, BULK, 1,200 mcg by Does Not Apply route daily.  1200mcg of vitamin 8 and 10 mcg of vitamin d, liquid supplement     No current facility-administered medications for this encounter.      ____________________________________________  ALLERGIES  No Known Allergies ____________________________________________  SOCIAL HISTORY  Social History     Tobacco Use    Smoking status: Never Smoker    Smokeless tobacco: Never Used   Substance Use Topics    Alcohol use: Never      ____________________________________________        Labs:     Hospital Outpatient Visit on 07/12/2021   Component Date Value Ref Range Status    Sodium 07/12/2021 141  136 - 145 mmol/L Final    Potassium 07/12/2021 3.4* 3.5 - 5.1 mmol/L Final    Chloride 07/12/2021 105  97 - 108 mmol/L Final    CO2 07/12/2021 30  21 - 32 mmol/L Final    Anion gap 07/12/2021 6  5 - 15 mmol/L Final    Glucose 07/12/2021 93  65 - 100 mg/dL Final    BUN 07/12/2021 18  6 - 20 MG/DL Final    Creatinine 07/12/2021 0.67  0.55 - 1.02 MG/DL Final    BUN/Creatinine ratio 07/12/2021 27* 12 - 20   Final    GFR est AA 07/12/2021 >60  >60 ml/min/1.73m2 Final    GFR est non-AA 07/12/2021 >60  >60 ml/min/1.73m2 Final    Estimated GFR is calculated using the IDMS-traceable Modification of Diet in Renal Disease (MDRD) Study equation, reported for both  Americans (GFRAA) and non- Americans (GFRNA), and normalized to 1.73m2 body surface area. The physician must decide which value applies to the patient.     Calcium 07/12/2021 9.7  8.5 - 10.1 MG/DL Final    WBC 07/12/2021 5.0  3.6 - 11.0 K/uL Final    RBC 07/12/2021 4.08  3.80 - 5.20 M/uL Final    HGB 07/12/2021 11.4* 11.5 - 16.0 g/dL Final    HCT 07/12/2021 35.8  35.0 - 47.0 % Final    MCV 07/12/2021 87.7  80.0 - 99.0 FL Final    MCH 07/12/2021 27.9  26.0 - 34.0 PG Final    MCHC 07/12/2021 31.8  30.0 - 36.5 g/dL Final    RDW 07/12/2021 15.8* 11.5 - 14.5 % Final    PLATELET 54/07/4281 454  150 - 400 K/uL Final    MPV 07/12/2021 11.5  8.9 - 12.9 FL Final    NRBC 07/12/2021 0.0  0  WBC Final    ABSOLUTE NRBC 07/12/2021 0.00  0.00 - 0.01 K/uL Final    Crossmatch Expiration 07/12/2021 07/23/2021,2359   Final    ABO/Rh(D) 07/12/2021 AB POSITIVE   Final  Antibody screen 07/12/2021 NEG   Final    INR 07/12/2021 1.0  0.9 - 1.1   Final    A single therapeutic range for Vit K antagonists may not be optimal for all indications - see June, 2008 issue of Chest, American College of Chest [employer] Evidence-Based Clinical Practice Guidelines, 8th Edition.  Prothrombin time 07/12/2021 10.2  9.0 - 11.1 sec Final    Color 07/12/2021 YELLOW/STRAW    Final    Color Reference Range: Straw, Yellow or Dark Yellow    Appearance 07/12/2021 CLEAR  CLEAR   Final    Specific gravity 07/12/2021 1.011  1.003 - 1.030   Final    pH (UA) 07/12/2021 6.0  5.0 - 8.0   Final    Protein 07/12/2021 Negative  NEG mg/dL Final    Glucose 07/12/2021 Negative  NEG mg/dL Final    Ketone 07/12/2021 Negative  NEG mg/dL Final    Bilirubin 07/12/2021 Negative  NEG   Final    Blood 07/12/2021 Negative  NEG   Final    Urobilinogen 07/12/2021 0.2  0.2 - 1.0 EU/dL Final    Nitrites 07/12/2021 Negative  NEG   Final    Leukocyte Esterase 07/12/2021 Negative  NEG   Final    UA:UC IF INDICATED 07/12/2021 CULTURE NOT INDICATED BY UA RESULT  CNI   Final    WBC 07/12/2021 0-4  0 - 4 /hpf Final    RBC 07/12/2021 0-5  0 - 5 /hpf Final    Epithelial cells 07/12/2021 FEW  FEW /lpf Final    Epithelial cell category consists of squamous cells and /or transitional urothelial cells. Renal tubular cells, if present, are separately identified as such.     Bacteria 07/12/2021 1+* NEG /hpf Final    Hyaline cast 07/12/2021 0-2  0 - 5 /lpf Final    Ventricular Rate 07/12/2021 53  BPM Final    Atrial Rate 07/12/2021 53  BPM Final    P-R Interval 07/12/2021 160  ms Final    QRS Duration 07/12/2021 92  ms Final    Q-T Interval 07/12/2021 458  ms Final    QTC Calculation (Bezet) 07/12/2021 429  ms Final    Calculated P Axis 07/12/2021 60  degrees Final    Calculated R Axis 07/12/2021 28  degrees Final    Calculated T Axis 07/12/2021 18  degrees Final    Diagnosis 07/12/2021    Final Value: Sinus bradycardia  Otherwise normal ECG  When compared with ECG of 27-MAR-2019 16:02,  Questionable change in QRS axis  Confirmed by Ki Payton MD. ([id number]) on 7/13/2021 11:26:58 PM      Hemoglobin A1c 07/12/2021 6.2* 4.0 - 5.6 % Final    Comment: NEW METHOD  PLEASE NOTE NEW REFERENCE RANGE  (NOTE)  HbA1C Interpretive Ranges  <5.7              Normal  5.7 - 6.4         Consider Prediabetes  >6.5              Consider Diabetes      Est. average glucose 07/12/2021 131  mg/dL Final    Special Requests: 07/12/2021 NO SPECIAL REQUESTS    Final    Culture result: 07/12/2021 MRSA NOT PRESENT    Final       Skin:     Denies open wounds, cuts, sores, rashes or other areas of concern in PAT assessment.           Arturo Pitt NP

## 2021-07-15 ENCOUNTER — TRANSCRIBE ORDER (OUTPATIENT)
Dept: REGISTRATION | Age: 61
End: 2021-07-15

## 2021-07-15 DIAGNOSIS — Z01.812 PRE-PROCEDURE LAB EXAM: Primary | ICD-10-CM

## 2021-07-16 ENCOUNTER — TRANSCRIBE ORDER (OUTPATIENT)
Dept: REGISTRATION | Age: 61
End: 2021-07-16

## 2021-07-16 ENCOUNTER — HOSPITAL ENCOUNTER (OUTPATIENT)
Dept: PREADMISSION TESTING | Age: 61
Discharge: HOME | End: 2021-07-16
Payer: COMMERCIAL

## 2021-07-16 DIAGNOSIS — Z01.812 PRE-PROCEDURE LAB EXAM: Primary | ICD-10-CM

## 2021-07-16 DIAGNOSIS — Z01.812 PRE-PROCEDURE LAB EXAM: ICD-10-CM

## 2021-07-16 PROCEDURE — U0005 INFEC AGEN DETEC AMPLI PROBE: HCPCS

## 2021-07-18 LAB
SARS-COV-2, XPLCVT: NOT DETECTED
SOURCE, COVRS: NORMAL

## 2021-07-19 ENCOUNTER — ANESTHESIA EVENT (OUTPATIENT)
Dept: SURGERY | Age: 61
End: 2021-07-19
Payer: COMMERCIAL

## 2021-07-20 ENCOUNTER — HOSPITAL ENCOUNTER (OUTPATIENT)
Age: 61
Discharge: HOME HEALTH | End: 2021-07-22
Attending: ORTHOPAEDIC SURGERY | Admitting: ORTHOPAEDIC SURGERY
Payer: COMMERCIAL

## 2021-07-20 ENCOUNTER — ANESTHESIA (OUTPATIENT)
Dept: SURGERY | Age: 61
End: 2021-07-20
Payer: COMMERCIAL

## 2021-07-20 DIAGNOSIS — M17.11 LOCALIZED OSTEOARTHRITIS OF RIGHT KNEE: Primary | ICD-10-CM

## 2021-07-20 LAB
GLUCOSE BLD STRIP.AUTO-MCNC: 108 MG/DL (ref 65–117)
SERVICE CMNT-IMP: NORMAL

## 2021-07-20 PROCEDURE — 74011000258 HC RX REV CODE- 258: Performed by: ORTHOPAEDIC SURGERY

## 2021-07-20 PROCEDURE — 77030040922 HC BLNKT HYPOTHRM STRY -A

## 2021-07-20 PROCEDURE — 74011250636 HC RX REV CODE- 250/636: Performed by: PHYSICIAN ASSISTANT

## 2021-07-20 PROCEDURE — 97530 THERAPEUTIC ACTIVITIES: CPT

## 2021-07-20 PROCEDURE — 77030035236 HC SUT PDS STRATFX BARB J&J -B: Performed by: ORTHOPAEDIC SURGERY

## 2021-07-20 PROCEDURE — 76010000172 HC OR TIME 2.5 TO 3 HR INTENSV-TIER 1: Performed by: ORTHOPAEDIC SURGERY

## 2021-07-20 PROCEDURE — 77030031139 HC SUT VCRL2 J&J -A: Performed by: ORTHOPAEDIC SURGERY

## 2021-07-20 PROCEDURE — 77030005513 HC CATH URETH FOL11 MDII -B: Performed by: ORTHOPAEDIC SURGERY

## 2021-07-20 PROCEDURE — 76060000036 HC ANESTHESIA 2.5 TO 3 HR: Performed by: ORTHOPAEDIC SURGERY

## 2021-07-20 PROCEDURE — C1713 ANCHOR/SCREW BN/BN,TIS/BN: HCPCS | Performed by: ORTHOPAEDIC SURGERY

## 2021-07-20 PROCEDURE — 74011000258 HC RX REV CODE- 258: Performed by: NURSE ANESTHETIST, CERTIFIED REGISTERED

## 2021-07-20 PROCEDURE — 76210000006 HC OR PH I REC 0.5 TO 1 HR: Performed by: ORTHOPAEDIC SURGERY

## 2021-07-20 PROCEDURE — C1776 JOINT DEVICE (IMPLANTABLE): HCPCS | Performed by: ORTHOPAEDIC SURGERY

## 2021-07-20 PROCEDURE — 77030006822 HC BLD SAW SAG BRSM -B: Performed by: ORTHOPAEDIC SURGERY

## 2021-07-20 PROCEDURE — 74011000250 HC RX REV CODE- 250: Performed by: ANESTHESIOLOGY

## 2021-07-20 PROCEDURE — 74011250636 HC RX REV CODE- 250/636: Performed by: ORTHOPAEDIC SURGERY

## 2021-07-20 PROCEDURE — 74011250636 HC RX REV CODE- 250/636: Performed by: NURSE ANESTHETIST, CERTIFIED REGISTERED

## 2021-07-20 PROCEDURE — 97116 GAIT TRAINING THERAPY: CPT

## 2021-07-20 PROCEDURE — 77030003601 HC NDL NRV BLK BBMI -A

## 2021-07-20 PROCEDURE — 74011250637 HC RX REV CODE- 250/637: Performed by: STUDENT IN AN ORGANIZED HEALTH CARE EDUCATION/TRAINING PROGRAM

## 2021-07-20 PROCEDURE — 74011000250 HC RX REV CODE- 250: Performed by: ORTHOPAEDIC SURGERY

## 2021-07-20 PROCEDURE — 2709999900 HC NON-CHARGEABLE SUPPLY: Performed by: ORTHOPAEDIC SURGERY

## 2021-07-20 PROCEDURE — C9290 INJ, BUPIVACAINE LIPOSOME: HCPCS | Performed by: ORTHOPAEDIC SURGERY

## 2021-07-20 PROCEDURE — 74011000250 HC RX REV CODE- 250: Performed by: PHYSICIAN ASSISTANT

## 2021-07-20 PROCEDURE — 77030018673: Performed by: ORTHOPAEDIC SURGERY

## 2021-07-20 PROCEDURE — 74011000250 HC RX REV CODE- 250: Performed by: NURSE ANESTHETIST, CERTIFIED REGISTERED

## 2021-07-20 PROCEDURE — 77030008462 HC STPLR SKN PROX J&J -A: Performed by: ORTHOPAEDIC SURGERY

## 2021-07-20 PROCEDURE — 82962 GLUCOSE BLOOD TEST: CPT

## 2021-07-20 PROCEDURE — 74011250636 HC RX REV CODE- 250/636: Performed by: ANESTHESIOLOGY

## 2021-07-20 PROCEDURE — 74011250637 HC RX REV CODE- 250/637: Performed by: PHYSICIAN ASSISTANT

## 2021-07-20 PROCEDURE — 77030041279 HC DRSG PRMSL AG MDII -B: Performed by: ORTHOPAEDIC SURGERY

## 2021-07-20 PROCEDURE — 97161 PT EVAL LOW COMPLEX 20 MIN: CPT

## 2021-07-20 PROCEDURE — 77030000032 HC CUF TRNQT ZIMM -B: Performed by: ORTHOPAEDIC SURGERY

## 2021-07-20 PROCEDURE — 77030010783 HC BOWL MX BN CEM J&J -B: Performed by: ORTHOPAEDIC SURGERY

## 2021-07-20 DEVICE — KNEE K1 TOT HEMI STD CEM IMPL CAPPED K1 ZIM: Type: IMPLANTABLE DEVICE | Status: FUNCTIONAL

## 2021-07-20 DEVICE — IMPLANTABLE DEVICE
Type: IMPLANTABLE DEVICE | Site: KNEE | Status: FUNCTIONAL
Brand: PERSONA® VIVACIT-E®

## 2021-07-20 DEVICE — IMPLANTABLE DEVICE
Type: IMPLANTABLE DEVICE | Site: KNEE | Status: FUNCTIONAL
Brand: PERSONA®

## 2021-07-20 DEVICE — IMPLANTABLE DEVICE
Type: IMPLANTABLE DEVICE | Site: KNEE | Status: FUNCTIONAL
Brand: PERSONA® NATURAL TIBIA®

## 2021-07-20 DEVICE — SMARTSET GHV GENTAMICIN HIGH VISCOSITY BONE CEMENT 40G
Type: IMPLANTABLE DEVICE | Site: KNEE | Status: FUNCTIONAL
Brand: SMARTSET

## 2021-07-20 RX ORDER — HYDROMORPHONE HYDROCHLORIDE 1 MG/ML
0.2 INJECTION, SOLUTION INTRAMUSCULAR; INTRAVENOUS; SUBCUTANEOUS
Status: DISCONTINUED | OUTPATIENT
Start: 2021-07-20 | End: 2021-07-20 | Stop reason: HOSPADM

## 2021-07-20 RX ORDER — SODIUM CHLORIDE 0.9 % (FLUSH) 0.9 %
5-40 SYRINGE (ML) INJECTION AS NEEDED
Status: DISCONTINUED | OUTPATIENT
Start: 2021-07-20 | End: 2021-07-20 | Stop reason: HOSPADM

## 2021-07-20 RX ORDER — SODIUM CHLORIDE, SODIUM LACTATE, POTASSIUM CHLORIDE, CALCIUM CHLORIDE 600; 310; 30; 20 MG/100ML; MG/100ML; MG/100ML; MG/100ML
125 INJECTION, SOLUTION INTRAVENOUS CONTINUOUS
Status: DISCONTINUED | OUTPATIENT
Start: 2021-07-20 | End: 2021-07-20 | Stop reason: HOSPADM

## 2021-07-20 RX ORDER — FACIAL-BODY WIPES
10 EACH TOPICAL DAILY PRN
Status: DISCONTINUED | OUTPATIENT
Start: 2021-07-22 | End: 2021-07-22 | Stop reason: HOSPADM

## 2021-07-20 RX ORDER — SODIUM CHLORIDE 0.9 % (FLUSH) 0.9 %
5-40 SYRINGE (ML) INJECTION EVERY 8 HOURS
Status: DISCONTINUED | OUTPATIENT
Start: 2021-07-20 | End: 2021-07-20 | Stop reason: HOSPADM

## 2021-07-20 RX ORDER — GABAPENTIN 300 MG/1
300 CAPSULE ORAL 3 TIMES DAILY
Status: DISCONTINUED | OUTPATIENT
Start: 2021-07-20 | End: 2021-07-22 | Stop reason: HOSPADM

## 2021-07-20 RX ORDER — HYDROXYZINE HYDROCHLORIDE 10 MG/1
10 TABLET, FILM COATED ORAL
Status: DISCONTINUED | OUTPATIENT
Start: 2021-07-20 | End: 2021-07-22 | Stop reason: HOSPADM

## 2021-07-20 RX ORDER — ONDANSETRON 2 MG/ML
INJECTION INTRAMUSCULAR; INTRAVENOUS AS NEEDED
Status: DISCONTINUED | OUTPATIENT
Start: 2021-07-20 | End: 2021-07-20 | Stop reason: HOSPADM

## 2021-07-20 RX ORDER — DEXAMETHASONE SODIUM PHOSPHATE 4 MG/ML
INJECTION, SOLUTION INTRA-ARTICULAR; INTRALESIONAL; INTRAMUSCULAR; INTRAVENOUS; SOFT TISSUE AS NEEDED
Status: DISCONTINUED | OUTPATIENT
Start: 2021-07-20 | End: 2021-07-20 | Stop reason: HOSPADM

## 2021-07-20 RX ORDER — ACETAMINOPHEN 500 MG
1000 TABLET ORAL EVERY 6 HOURS
Status: DISCONTINUED | OUTPATIENT
Start: 2021-07-20 | End: 2021-07-22 | Stop reason: HOSPADM

## 2021-07-20 RX ORDER — ACETAMINOPHEN 325 MG/1
650 TABLET ORAL ONCE
Status: DISCONTINUED | OUTPATIENT
Start: 2021-07-20 | End: 2021-07-20 | Stop reason: HOSPADM

## 2021-07-20 RX ORDER — DIPHENHYDRAMINE HYDROCHLORIDE 50 MG/ML
12.5 INJECTION, SOLUTION INTRAMUSCULAR; INTRAVENOUS AS NEEDED
Status: DISCONTINUED | OUTPATIENT
Start: 2021-07-20 | End: 2021-07-20 | Stop reason: HOSPADM

## 2021-07-20 RX ORDER — TRAMADOL HYDROCHLORIDE 50 MG/1
100 TABLET ORAL
Status: DISCONTINUED | OUTPATIENT
Start: 2021-07-20 | End: 2021-07-22 | Stop reason: HOSPADM

## 2021-07-20 RX ORDER — ROPIVACAINE HYDROCHLORIDE 5 MG/ML
INJECTION, SOLUTION EPIDURAL; INFILTRATION; PERINEURAL
Status: COMPLETED | OUTPATIENT
Start: 2021-07-20 | End: 2021-07-20

## 2021-07-20 RX ORDER — LIDOCAINE HYDROCHLORIDE 10 MG/ML
0.1 INJECTION, SOLUTION EPIDURAL; INFILTRATION; INTRACAUDAL; PERINEURAL AS NEEDED
Status: DISCONTINUED | OUTPATIENT
Start: 2021-07-20 | End: 2021-07-20 | Stop reason: HOSPADM

## 2021-07-20 RX ORDER — FAMOTIDINE 20 MG/1
20 TABLET, FILM COATED ORAL DAILY
Status: DISCONTINUED | OUTPATIENT
Start: 2021-07-20 | End: 2021-07-22 | Stop reason: HOSPADM

## 2021-07-20 RX ORDER — ZOLPIDEM TARTRATE 5 MG/1
5 TABLET ORAL
Status: DISCONTINUED | OUTPATIENT
Start: 2021-07-20 | End: 2021-07-22 | Stop reason: HOSPADM

## 2021-07-20 RX ORDER — OXYCODONE HYDROCHLORIDE 5 MG/1
5 TABLET ORAL
Status: DISCONTINUED | OUTPATIENT
Start: 2021-07-20 | End: 2021-07-22 | Stop reason: HOSPADM

## 2021-07-20 RX ORDER — WARFARIN SODIUM 5 MG/1
5 TABLET ORAL ONCE
Status: COMPLETED | OUTPATIENT
Start: 2021-07-20 | End: 2021-07-20

## 2021-07-20 RX ORDER — OXYCODONE AND ACETAMINOPHEN 5; 325 MG/1; MG/1
1 TABLET ORAL AS NEEDED
Status: DISCONTINUED | OUTPATIENT
Start: 2021-07-20 | End: 2021-07-20 | Stop reason: HOSPADM

## 2021-07-20 RX ORDER — FENTANYL CITRATE 50 UG/ML
50 INJECTION, SOLUTION INTRAMUSCULAR; INTRAVENOUS AS NEEDED
Status: DISCONTINUED | OUTPATIENT
Start: 2021-07-20 | End: 2021-07-20 | Stop reason: HOSPADM

## 2021-07-20 RX ORDER — MIDAZOLAM HYDROCHLORIDE 1 MG/ML
1 INJECTION, SOLUTION INTRAMUSCULAR; INTRAVENOUS AS NEEDED
Status: DISCONTINUED | OUTPATIENT
Start: 2021-07-20 | End: 2021-07-20 | Stop reason: HOSPADM

## 2021-07-20 RX ORDER — PHENYLEPHRINE HCL IN 0.9% NACL 0.4MG/10ML
SYRINGE (ML) INTRAVENOUS AS NEEDED
Status: DISCONTINUED | OUTPATIENT
Start: 2021-07-20 | End: 2021-07-20 | Stop reason: HOSPADM

## 2021-07-20 RX ORDER — OXYCODONE HYDROCHLORIDE 5 MG/1
10 TABLET ORAL
Status: DISCONTINUED | OUTPATIENT
Start: 2021-07-20 | End: 2021-07-22 | Stop reason: HOSPADM

## 2021-07-20 RX ORDER — NALOXONE HYDROCHLORIDE 0.4 MG/ML
0.4 INJECTION, SOLUTION INTRAMUSCULAR; INTRAVENOUS; SUBCUTANEOUS AS NEEDED
Status: DISCONTINUED | OUTPATIENT
Start: 2021-07-20 | End: 2021-07-22 | Stop reason: HOSPADM

## 2021-07-20 RX ORDER — FENTANYL CITRATE 50 UG/ML
25 INJECTION, SOLUTION INTRAMUSCULAR; INTRAVENOUS
Status: DISCONTINUED | OUTPATIENT
Start: 2021-07-20 | End: 2021-07-20 | Stop reason: HOSPADM

## 2021-07-20 RX ORDER — MORPHINE SULFATE 2 MG/ML
2 INJECTION, SOLUTION INTRAMUSCULAR; INTRAVENOUS
Status: DISCONTINUED | OUTPATIENT
Start: 2021-07-20 | End: 2021-07-20 | Stop reason: HOSPADM

## 2021-07-20 RX ORDER — POLYETHYLENE GLYCOL 3350 17 G/17G
17 POWDER, FOR SOLUTION ORAL DAILY
Status: DISCONTINUED | OUTPATIENT
Start: 2021-07-20 | End: 2021-07-22 | Stop reason: HOSPADM

## 2021-07-20 RX ORDER — SODIUM CHLORIDE 9 MG/ML
25 INJECTION, SOLUTION INTRAVENOUS CONTINUOUS
Status: DISCONTINUED | OUTPATIENT
Start: 2021-07-20 | End: 2021-07-20 | Stop reason: HOSPADM

## 2021-07-20 RX ORDER — FENTANYL CITRATE 50 UG/ML
INJECTION, SOLUTION INTRAMUSCULAR; INTRAVENOUS AS NEEDED
Status: DISCONTINUED | OUTPATIENT
Start: 2021-07-20 | End: 2021-07-20 | Stop reason: HOSPADM

## 2021-07-20 RX ORDER — VERAPAMIL HYDROCHLORIDE 240 MG/1
240 TABLET, FILM COATED, EXTENDED RELEASE ORAL
Status: DISCONTINUED | OUTPATIENT
Start: 2021-07-20 | End: 2021-07-22 | Stop reason: HOSPADM

## 2021-07-20 RX ORDER — HYDROMORPHONE HYDROCHLORIDE 1 MG/ML
1 INJECTION, SOLUTION INTRAMUSCULAR; INTRAVENOUS; SUBCUTANEOUS
Status: ACTIVE | OUTPATIENT
Start: 2021-07-20 | End: 2021-07-21

## 2021-07-20 RX ORDER — KETOROLAC TROMETHAMINE 30 MG/ML
15 INJECTION, SOLUTION INTRAMUSCULAR; INTRAVENOUS EVERY 6 HOURS
Status: COMPLETED | OUTPATIENT
Start: 2021-07-20 | End: 2021-07-21

## 2021-07-20 RX ORDER — LOSARTAN POTASSIUM 50 MG/1
100 TABLET ORAL DAILY
Status: DISCONTINUED | OUTPATIENT
Start: 2021-07-21 | End: 2021-07-22 | Stop reason: HOSPADM

## 2021-07-20 RX ORDER — SODIUM CHLORIDE 0.9 % (FLUSH) 0.9 %
5-40 SYRINGE (ML) INJECTION EVERY 8 HOURS
Status: DISCONTINUED | OUTPATIENT
Start: 2021-07-20 | End: 2021-07-22 | Stop reason: HOSPADM

## 2021-07-20 RX ORDER — CEFAZOLIN SODIUM 1 G/3ML
INJECTION, POWDER, FOR SOLUTION INTRAMUSCULAR; INTRAVENOUS AS NEEDED
Status: DISCONTINUED | OUTPATIENT
Start: 2021-07-20 | End: 2021-07-20 | Stop reason: HOSPADM

## 2021-07-20 RX ORDER — SODIUM CHLORIDE 0.9 % (FLUSH) 0.9 %
5-40 SYRINGE (ML) INJECTION AS NEEDED
Status: DISCONTINUED | OUTPATIENT
Start: 2021-07-20 | End: 2021-07-22 | Stop reason: HOSPADM

## 2021-07-20 RX ORDER — MIDAZOLAM HYDROCHLORIDE 1 MG/ML
0.5 INJECTION, SOLUTION INTRAMUSCULAR; INTRAVENOUS
Status: DISCONTINUED | OUTPATIENT
Start: 2021-07-20 | End: 2021-07-20 | Stop reason: HOSPADM

## 2021-07-20 RX ORDER — MIDAZOLAM HYDROCHLORIDE 1 MG/ML
INJECTION, SOLUTION INTRAMUSCULAR; INTRAVENOUS AS NEEDED
Status: DISCONTINUED | OUTPATIENT
Start: 2021-07-20 | End: 2021-07-20 | Stop reason: HOSPADM

## 2021-07-20 RX ORDER — BUPIVACAINE HYDROCHLORIDE 5 MG/ML
INJECTION, SOLUTION EPIDURAL; INTRACAUDAL
Status: COMPLETED | OUTPATIENT
Start: 2021-07-20 | End: 2021-07-20

## 2021-07-20 RX ORDER — PROPOFOL 10 MG/ML
INJECTION, EMULSION INTRAVENOUS AS NEEDED
Status: DISCONTINUED | OUTPATIENT
Start: 2021-07-20 | End: 2021-07-20 | Stop reason: HOSPADM

## 2021-07-20 RX ORDER — GLYCOPYRROLATE 0.2 MG/ML
INJECTION INTRAMUSCULAR; INTRAVENOUS AS NEEDED
Status: DISCONTINUED | OUTPATIENT
Start: 2021-07-20 | End: 2021-07-20 | Stop reason: HOSPADM

## 2021-07-20 RX ORDER — SODIUM CHLORIDE 9 MG/ML
125 INJECTION, SOLUTION INTRAVENOUS CONTINUOUS
Status: DISPENSED | OUTPATIENT
Start: 2021-07-20 | End: 2021-07-21

## 2021-07-20 RX ORDER — ONDANSETRON 2 MG/ML
4 INJECTION INTRAMUSCULAR; INTRAVENOUS AS NEEDED
Status: DISCONTINUED | OUTPATIENT
Start: 2021-07-20 | End: 2021-07-20 | Stop reason: HOSPADM

## 2021-07-20 RX ORDER — TRANEXAMIC ACID 100 MG/ML
INJECTION, SOLUTION INTRAVENOUS AS NEEDED
Status: DISCONTINUED | OUTPATIENT
Start: 2021-07-20 | End: 2021-07-20 | Stop reason: HOSPADM

## 2021-07-20 RX ORDER — AMOXICILLIN 250 MG
1 CAPSULE ORAL 2 TIMES DAILY
Status: DISCONTINUED | OUTPATIENT
Start: 2021-07-20 | End: 2021-07-22 | Stop reason: HOSPADM

## 2021-07-20 RX ORDER — ONDANSETRON 2 MG/ML
4 INJECTION INTRAMUSCULAR; INTRAVENOUS
Status: ACTIVE | OUTPATIENT
Start: 2021-07-20 | End: 2021-07-22

## 2021-07-20 RX ORDER — HYDROCHLOROTHIAZIDE 25 MG/1
50 TABLET ORAL DAILY
Status: DISCONTINUED | OUTPATIENT
Start: 2021-07-20 | End: 2021-07-22 | Stop reason: HOSPADM

## 2021-07-20 RX ORDER — OXYCODONE HYDROCHLORIDE 5 MG/1
5 TABLET ORAL
Status: DISCONTINUED | OUTPATIENT
Start: 2021-07-20 | End: 2021-07-20

## 2021-07-20 RX ADMIN — ZOLPIDEM TARTRATE 5 MG: 5 TABLET ORAL at 23:12

## 2021-07-20 RX ADMIN — TRANEXAMIC ACID 1000 MG: 100 INJECTION, SOLUTION INTRAVENOUS at 07:55

## 2021-07-20 RX ADMIN — HYDROCHLOROTHIAZIDE 50 MG: 25 TABLET ORAL at 14:41

## 2021-07-20 RX ADMIN — OXYCODONE HYDROCHLORIDE 5 MG: 5 TABLET ORAL at 13:46

## 2021-07-20 RX ADMIN — OXYCODONE 10 MG: 5 TABLET ORAL at 18:31

## 2021-07-20 RX ADMIN — KETOROLAC TROMETHAMINE 15 MG: 30 INJECTION, SOLUTION INTRAMUSCULAR; INTRAVENOUS at 14:43

## 2021-07-20 RX ADMIN — ACETAMINOPHEN 1000 MG: 500 TABLET ORAL at 20:20

## 2021-07-20 RX ADMIN — CEFAZOLIN 2 G: 330 INJECTION, POWDER, FOR SOLUTION INTRAMUSCULAR; INTRAVENOUS at 07:56

## 2021-07-20 RX ADMIN — PROPOFOL 30 MG: 10 INJECTION, EMULSION INTRAVENOUS at 07:35

## 2021-07-20 RX ADMIN — SODIUM CHLORIDE, POTASSIUM CHLORIDE, SODIUM LACTATE AND CALCIUM CHLORIDE 125 ML/HR: 600; 310; 30; 20 INJECTION, SOLUTION INTRAVENOUS at 07:05

## 2021-07-20 RX ADMIN — GABAPENTIN 300 MG: 300 CAPSULE ORAL at 21:06

## 2021-07-20 RX ADMIN — MIDAZOLAM 2 MG: 1 INJECTION INTRAMUSCULAR; INTRAVENOUS at 07:25

## 2021-07-20 RX ADMIN — CEFAZOLIN SODIUM 2 G: 1 INJECTION, POWDER, FOR SOLUTION INTRAMUSCULAR; INTRAVENOUS at 15:59

## 2021-07-20 RX ADMIN — FAMOTIDINE 20 MG: 20 TABLET, FILM COATED ORAL at 13:48

## 2021-07-20 RX ADMIN — KETOROLAC TROMETHAMINE 15 MG: 30 INJECTION, SOLUTION INTRAMUSCULAR; INTRAVENOUS at 20:20

## 2021-07-20 RX ADMIN — SODIUM CHLORIDE, POTASSIUM CHLORIDE, SODIUM LACTATE AND CALCIUM CHLORIDE: 600; 310; 30; 20 INJECTION, SOLUTION INTRAVENOUS at 09:20

## 2021-07-20 RX ADMIN — PROPOFOL 40 MG: 10 INJECTION, EMULSION INTRAVENOUS at 09:07

## 2021-07-20 RX ADMIN — GLYCOPYRROLATE 0.25 MG: 0.2 INJECTION, SOLUTION INTRAMUSCULAR; INTRAVENOUS at 08:35

## 2021-07-20 RX ADMIN — Medication 80 MCG: at 07:40

## 2021-07-20 RX ADMIN — PROPOFOL 50 MCG/KG/MIN: 10 INJECTION, EMULSION INTRAVENOUS at 07:45

## 2021-07-20 RX ADMIN — MIDAZOLAM HYDROCHLORIDE 2 MG: 1 INJECTION, SOLUTION INTRAMUSCULAR; INTRAVENOUS at 07:14

## 2021-07-20 RX ADMIN — SODIUM CHLORIDE 125 ML/HR: 9 INJECTION, SOLUTION INTRAVENOUS at 10:28

## 2021-07-20 RX ADMIN — FENTANYL CITRATE 100 MCG: 50 INJECTION, SOLUTION INTRAMUSCULAR; INTRAVENOUS at 07:14

## 2021-07-20 RX ADMIN — BUPIVACAINE HYDROCHLORIDE 12 MG: 5 INJECTION, SOLUTION EPIDURAL; INTRACAUDAL; PERINEURAL at 07:42

## 2021-07-20 RX ADMIN — WARFARIN SODIUM 5 MG: 5 TABLET ORAL at 13:48

## 2021-07-20 RX ADMIN — PROPOFOL 30 MG: 10 INJECTION, EMULSION INTRAVENOUS at 09:10

## 2021-07-20 RX ADMIN — FENTANYL CITRATE 100 MCG: 50 INJECTION, SOLUTION INTRAMUSCULAR; INTRAVENOUS at 07:33

## 2021-07-20 RX ADMIN — TRAMADOL HYDROCHLORIDE 100 MG: 50 TABLET, COATED ORAL at 15:55

## 2021-07-20 RX ADMIN — ACETAMINOPHEN 1000 MG: 500 TABLET ORAL at 13:47

## 2021-07-20 RX ADMIN — PHENYLEPHRINE HYDROCHLORIDE 20 MCG/MIN: 10 INJECTION INTRAVENOUS at 07:48

## 2021-07-20 RX ADMIN — ONDANSETRON HYDROCHLORIDE 4 MG: 2 INJECTION, SOLUTION INTRAMUSCULAR; INTRAVENOUS at 08:14

## 2021-07-20 RX ADMIN — VERAPAMIL HYDROCHLORIDE 240 MG: 240 TABLET, FILM COATED, EXTENDED RELEASE ORAL at 21:06

## 2021-07-20 RX ADMIN — ROPIVACAINE HYDROCHLORIDE 30 ML: 5 INJECTION, SOLUTION EPIDURAL; INFILTRATION; PERINEURAL at 07:21

## 2021-07-20 RX ADMIN — DEXAMETHASONE SODIUM PHOSPHATE 8 MG: 4 INJECTION, SOLUTION INTRAMUSCULAR; INTRAVENOUS at 08:13

## 2021-07-20 NOTE — ANESTHESIA PREPROCEDURE EVALUATION
Relevant Problems   No relevant active problems       Anesthetic History   No history of anesthetic complications            Review of Systems / Medical History  Patient summary reviewed, nursing notes reviewed and pertinent labs reviewed    Pulmonary  Within defined limits                 Neuro/Psych   Within defined limits           Cardiovascular    Hypertension: well controlled              Exercise tolerance: >4 METS     GI/Hepatic/Renal  Within defined limits              Endo/Other        Morbid obesity     Other Findings              Physical Exam    Airway  Mallampati: II  TM Distance: 4 - 6 cm  Neck ROM: normal range of motion   Mouth opening: Normal     Cardiovascular    Rhythm: regular  Rate: normal         Dental  No notable dental hx       Pulmonary  Breath sounds clear to auscultation               Abdominal         Other Findings            Anesthetic Plan    ASA: 3  Anesthesia type: spinal      Post-op pain plan if not by surgeon: peripheral nerve block single    Induction: Intravenous  Anesthetic plan and risks discussed with: Patient

## 2021-07-20 NOTE — ADDENDUM NOTE
Addendum  created 07/20/21 1035 by Lamont Schulte CRNA    Flowsheet accepted, Intraprocedure Flowsheets edited

## 2021-07-20 NOTE — ANESTHESIA PROCEDURE NOTES
Peripheral Block    Start time: 7/20/2021 7:15 AM  End time: 7/20/2021 7:21 AM  Performed by: Mary Anne Wilson DO  Authorized by: Mary Anne Wilson DO       Pre-procedure: Indications: at surgeon's request and post-op pain management    Preanesthetic Checklist: patient identified, risks and benefits discussed, site marked, timeout performed, anesthesia consent given and patient being monitored      Block Type:   Block Type:   Adductor canal block  Laterality:  Right  Monitoring:  Responsive to questions, standard ASA monitoring, frequent vital sign checks, continuous pulse ox, oxygen and heart rate  Injection Technique:  Single shot  Procedures: ultrasound guided    Patient Position: supine  Prep: chlorhexidine    Location:  Upper thigh  Needle Type:  Stimuplex  Needle Gauge:  22 G  Needle Localization:  Ultrasound guidance  Medication Injected:  Ropivacaine (PF) (NAROPIN)(0.5%) 5 mg/mL injection, 30 mL    Assessment:  Number of attempts:  1  Injection Assessment:  Incremental injection every 5 mL, no paresthesia, no intravascular symptoms, negative aspiration for blood and local visualized surrounding nerve on ultrasound  Patient tolerance:  Patient tolerated the procedure well with no immediate complications

## 2021-07-20 NOTE — PROGRESS NOTES
Ortho Post-Op Note    7/20/2021  12:13 PM    POD:  Day of Surgery  S/P:  Procedure(s):  RIGHT TOTAL KNEE ARTHROPLASTY    Afebrile/VSS, NAD, A&O x 3  Obese  Doing well without complaints of nausea  Pain well controlled  Calves soft/NTTP Bilaterally  Leg soft. Dressing clean and dry  Not moving lower extremities much, spinal and block remain effective  Neurocirculatory exam intact and within normal range. Lab Results   Component Value Date/Time    HGB 11.4 (L) 07/12/2021 12:18 PM    INR 1.0 07/12/2021 12:18 PM     Recent Labs     07/12/21  1218 03/29/21  1207 03/29/21  1207   CREA 0.67   < > 0.77   BUN 18  --  15    < > = values in this interval not displayed. Estimated Creatinine Clearance: 109.4 mL/min (by C-G formula based on SCr of 0.67 mg/dL).   Visit Vitals  BP (!) 143/75 (BP 1 Location: Right upper arm, BP Patient Position: At rest)   Pulse (!) 58   Temp 97.3 °F (36.3 °C)   Resp 14   Ht 5' 4\" (1.626 m)   Wt 118.3 kg (260 lb 12.9 oz)   SpO2 99%   BMI 44.77 kg/m²       PLAN:  DVT prophylaxis: Warfarin  WBAT with PT-mobilization  Pain Control: Tylenol, toradol, oxycodone, tramadol  Plan to D/C Home in 1-2 days      Merry Carter, [address]    Department of Orthopaedics  [phone]

## 2021-07-20 NOTE — PERIOP NOTES
TRANSFER - OUT REPORT:    Verbal report given to Samy Villalta RN(name) on Aaron Reece  being transferred to [facility](unit) for routine post - op       Report consisted of patients Situation, Background, Assessment and   Recommendations(SBAR). Information from the following report(s) SBAR, Kardex, OR Summary, Procedure Summary, Intake/Output, MAR and Cardiac Rhythm NSR was reviewed with the receiving nurse. Lines:   Peripheral IV 07/20/21 Left Hand (Active)   Site Assessment Clean, dry, & intact 07/20/21 1015   Phlebitis Assessment 0 07/20/21 1015   Infiltration Assessment 0 07/20/21 1015   Dressing Status Clean, dry, & intact 07/20/21 1015   Dressing Type Transparent;Tape 07/20/21 1015   Hub Color/Line Status Pink; Infusing 07/20/21 1015   Action Taken Open ports on tubing capped 07/20/21 1015   Alcohol Cap Used Yes 07/20/21 1015        Opportunity for questions and clarification was provided.       Patient transported with:   [employer]

## 2021-07-20 NOTE — PROGRESS NOTES
Pharmacist Note - Warfarin Dosing  Consult provided for this 63 y.o. female to manage warfarin for VTE ppx s/p RTK arthroplasty    INR Goal: 1.7- 2.2    Therapy Day: 1    Preop Dose: [place]- none    Drugs that may increase INR: None  Drugs that may decrease INR: None  Other current anticoagulants/ drugs that may increase bleeding risk: None  Risk factors: None  Daily INR ordered: YES    No results for input(s): HGB, INR, HGBEXT, INREXT in the last 72 hours. Date               INR                 Dose  7/20  --  5 mg    Assessment/ Plan: Will order warfarin 5 mg PO x 1 dose. Pharmacy will continue to monitor daily and adjust therapy as indicated.

## 2021-07-20 NOTE — H&P
Date of Surgery Update:  Aaron Reece was seen and examined. History and physical has been reviewed. The patient has been examined.  There have been no significant clinical changes since the completion of the originally dated History and Physical.    Signed By: Estefani Kimball MD     July 20, 2021 7:40 AM

## 2021-07-20 NOTE — PROGRESS NOTES
Problem: Mobility Impaired (Adult and Pediatric)  Goal: *Acute Goals and Plan of Care (Insert Text)  7/20/2021 1926 by Gem Robertson, PT  Outcome: Progressing Towards Goal  Note: FUNCTIONAL STATUS PRIOR TO ADMISSION: Patient was modified independent using a single point cane for functional mobility. HOME SUPPORT PRIOR TO ADMISSION: The patient lived with  but did not require assist.    Physical Therapy Goals  Initiated 7/20/2021    1. Patient will move from supine to sit and sit to supine  and scoot up and down in bed with modified independence within 4 days. 2. Patient will perform sit to stand with modified independence within 4 days. 3. Patient will ambulate with modified independence for > 150 feet with the least restrictive device within 4 days. 4. Patient will ascend/descend 4 stairs with one handrail(s) with modified independence within 4 days. 5. Patient will perform home exercise program per protocol with supervision/set-up within 4 days. 6. Patient will demonstrate AROM 0-90 degrees in operative joint within 4 days. 7/20/2021 1926 by Gem Robertson, PT  Outcome: Progressing Towards Goal     Problem: Mobility Impaired (Adult and Pediatric)  Goal: *Acute Goals and Plan of Care (Insert Text)  7/20/2021 1926 by Gem Robertson, PT  Outcome: Progressing Towards Goal  Note: FUNCTIONAL STATUS PRIOR TO ADMISSION: Patient was modified independent using a single point cane for functional mobility. HOME SUPPORT PRIOR TO ADMISSION: The patient lived with  but did not require assist.    Physical Therapy Goals  Initiated 7/20/2021    1. Patient will move from supine to sit and sit to supine  and scoot up and down in bed with modified independence within 4 days. 2. Patient will perform sit to stand with modified independence within 4 days. 3. Patient will ambulate with modified independence for > 150 feet with the least restrictive device within 4 days.   4. Patient will ascend/descend 4 stairs with one handrail(s) with modified independence within 4 days. 5. Patient will perform home exercise program per protocol with supervision/set-up within 4 days. 6. Patient will demonstrate AROM 0-90 degrees in operative joint within 4 days. PHYSICAL THERAPY EVALUATION  Patient: Damon Bob (23 y.o. female)  Date: 7/20/2021  Primary Diagnosis: Primary osteoarthritis of right knee [M17.11]  Localized osteoarthritis of right knee [M17.11]  Procedure(s) (LRB):  RIGHT TOTAL KNEE ARTHROPLASTY (Right) Day of Surgery   Precautions:   WBAT, Fall    ASSESSMENT  Based on the objective data described below, the patient presents POD# 0 Right TKR with pain right knee, decreased AROM/strength and function right leg, decreased activity tolerance, decline in functional mobility and impaired standing balance/gait. Pt mobilized with close guarding secondary to pt still with some numbness - however - with extra time pt able to amb short distance with RW with no knee buckling observed. Anticipate pt will be ready for discharge after 1 - 2 sessions to advance exercise, amb distance and progress to stairs. Current Level of Function Impacting Discharge (mobility/balance): CGA to min assist x 2 for OOB/transfers and amb with RW    Functional Outcome Measure: The patient scored 55/100 on the Barthel Index outcome measure . Other factors to consider for discharge: supportive family     Patient will benefit from skilled therapy intervention to address the above noted impairments. PLAN :  Recommendations and Planned Interventions: bed mobility training, transfer training, gait training, therapeutic exercises, patient and family training/education, and therapeutic activities      Frequency/Duration: Patient will be followed by physical therapy:  twice daily to address goals.     Recommendation for discharge: (in order for the patient to meet his/her long term goals)  Physical therapy at least 2 days/week in the home This discharge recommendation:  Has been made in collaboration with the attending provider and/or case management    IF patient discharges home will need the following DME: patient owns DME required for discharge         SUBJECTIVE:   Patient stated [employer] I please get up.     OBJECTIVE DATA SUMMARY:   HISTORY:    Past Medical History:   Diagnosis Date    Chronic constipation     Hypertension     Obesity, Class II, BMI 35-39.9     Pre-diabetes 2019    A1C- 6.1    PVC (premature ventricular contraction)     States never been to cardio- has had for many years     Past Surgical History:   Procedure Laterality Date    HX  SECTION N/A     x 3    HX COLONOSCOPY      HX KNEE REPLACEMENT Left 2019    HX TONSILLECTOMY      HX WISDOM TEETH EXTRACTION Bilateral        Personal factors and/or comorbidities impacting plan of care: as above    Home Situation  Home Environment: Private residence  # Steps to Enter: 5  Rails to Enter: Yes  Hand Rails : Bilateral (cant reach both at same time)  One/Two Story Residence: Two story, live on 1st floor  # of Interior Steps: [address]: Right  Living Alone: No  Support Systems: Spouse/Significant Other/Partner  Patient Expects to be Discharged to[de-identified] [employer]  Current DME Used/Available at Home: Walker, rolling, [email or web address] chair  Tub or Shower Type: Tub/Shower combination    EXAMINATION/PRESENTATION/DECISION MAKING:   Critical Behavior:  Neurologic State: Alert  Orientation Level: Oriented X4  Cognition: Appropriate decision making, Appropriate safety awareness  Safety/Judgement: Awareness of environment  Skin:  Right leg ace wrap in place - no drainage noted    Range Of Motion:  AROM: Generally decreased, functional                       Strength:    Strength: Generally decreased, functional                    Tone & Sensation:   Tone: Normal              Sensation: Intact               Coordination:  Coordination: Within functional limits    Functional Mobility:  Bed Mobility:     Supine to Sit: Minimum assistance;Assist x1  Sit to Supine: Minimum assistance;Assist x1  Scooting: Stand-by assistance; Additional time  Transfers:  Sit to Stand: Minimum assistance;Assist x2  Stand to Sit: Contact guard assistance;Assist x2                       Balance:   Sitting: Intact; Without support  Standing: Impaired; With support  Standing - Static: Good;Constant support  Standing - Dynamic : Fair;Constant support  Ambulation/Gait Training:  Distance (ft): 25 Feet (ft)  Assistive Device: Walker, rolling;Gait belt  Ambulation - Level of Assistance: Contact guard assistance;Assist x2        Gait Abnormalities: Antalgic;Decreased step clearance; Step to gait  Right Side Weight Bearing: As tolerated  Left Side Weight Bearing: Full  Base of Support: Center of gravity altered;Shift to left  Stance: Right decreased  Speed/Elisa: Slow  Step Length: Left shortened;Right shortened  Swing Pattern: Right asymmetrical                 Therapeutic Exercises:   Pt instructed in ankle pumps and use of incentive spirometer - 10 reps once hr when awake. Functional Measure:  Barthel Index:    Bathin  Bladder: 5  Bowels: 10  Groomin  Dressin  Feeding: 10  Mobility: 0  Stairs: 0  Toilet Use: 5  Transfer (Bed to Chair and Back): 10  Total: 50/100       The Barthel ADL Index: Guidelines  1. The index should be used as a record of what a patient does, not as a record of what a patient could do. 2. The main aim is to establish degree of independence from any help, physical or verbal, however minor and for whatever reason. 3. The need for supervision renders the patient not independent. 4. A patient's performance should be established using the best available evidence. Asking the patient, friends/relatives and nurses are the usual sources, but direct observation and common sense are also important. However direct testing is not needed.   5. Usually the patient's performance over the preceding 24-48 hours is important, but occasionally longer periods will be relevant. 6. Middle categories imply that the patient supplies over 50 per cent of the effort. 7. Use of aids to be independent is allowed. Thi Carver., Barthel, D.W. (7095). Functional evaluation: the Barthel Index. [facility] (14)2. ELA Ramirez, Matt Rivera., Rafiq Chetan., [address], [address] [place] [address] (1999). Measuring the change indisability after inpatient rehabilitation; comparison of the responsiveness of the Barthel Index and Functional [place] Measure. Journal of Neurology, Neurosurgery, and Psychiatry, 66(4), 342-469. Laron Macias, N.J.A, ALON Lozoya, & Nir Dotson MBreanneA. (2004.) Assessment of post-stroke quality of life in cost-effectiveness studies: The usefulness of the Barthel Index and the EuroQoL-5D. Quality of Life Research, 15, 168-94           Physical Therapy Evaluation Charge Determination   History Examination Presentation Decision-Making   LOW Complexity : Zero comorbidities / personal factors that will impact the outcome / POC LOW Complexity : 1-2 Standardized tests and measures addressing body structure, function, activity limitation and / or participation in recreation  LOW Complexity : Stable, uncomplicated  LOW Complexity : FOTO score of       Based on the above components, the patient evaluation is determined to be of the following complexity level: LOW     Pain Rating:  Pre-treatment right knee 5/10; post-treatment 7/10    Activity Tolerance:   Good - POD# 0 Right TKR - pt mobilized slowly - no knee buckling observed however right leg still somewhat numb    After treatment patient left in no apparent distress:   Supine in bed, Call bell within reach, and Caregiver / family present    COMMUNICATION/EDUCATION:   The patients plan of care was discussed with: Registered nurse.      Fall prevention education was provided and the patient/caregiver indicated understanding., Patient/family have participated as able in goal setting and plan of care. , and Patient/family agree to work toward stated goals and plan of care.     Thank you for this referral.  Kelli Hilliard, PT   Time Calculation: 40 mins

## 2021-07-20 NOTE — OP NOTES
Name: Chandni White  MRN:  [id number]  : 1960  Age:  63 y.o. Surgery Date: 2021      OPERATIVE REPORT - RIGHT TOTAL KNEE REPLACEMENT    PREOPERATIVE DIAGNOSIS: Osteoarthritis, right knee. POSTOPERATIVE DIAGNOSIS: Osteoarthritis, right knee. PROCEDURE PERFORMED: Right total knee arthroplasty. SURGEON: Chelle Winter MD    FIRST ASSISTANT:   Yony Olvera PA-C    ANESTHESIA: Spinal    PRE-OP ANTIBIOTIC: Ancef 2g    COMPLICATIONS: None. ESTIMATED BLOOD LOSS: 50 mL. SPECIMENS REMOVED: None. COMPONENTS IMPLANTED:   Implant Name Type Inv. Item Serial No.  Lot No. LRB No. Used Action   CEMENT BNE 40GM FULL DOSE PMMA W/ GENT HI VISC RADPQ LNG - SNA  CEMENT BNE 40GM FULL DOSE PMMA W/ GENT HI VISC RADPQ LNG NA [employer]_ [id number] Right 2 Implanted   COMPONENT FEM SZ 8 R KNEE CO CHROM TAYLOR CRUCE RET DALJIT - SNA  COMPONENT FEM SZ 8 R KNEE CO CHROM TAYLOR CRUCE RET DALJIT NA [employer] [id number] Right 1 Implanted   TIB PRN NP STM 5 DEG SZ ER -- PERSONA - SNA  TIB PRN NP STM 5 DEG SZ ER -- PERSONA NA [employer]IDA [employer]! [id number] Right 1 Implanted   STEM KNE EXT TPR DALJIT [id number] -- PERSONA - SNA  STEM KNE EXT TPR DALJIT [id number] -- PERSONA NA [employer]IDA [employer]! [id number] Right 1 Implanted   PSN MC VE ASF R 16MM 8-11 EF - SNA  PSN MC VE ASF R 16MM 8-11 EF NA [employer]IDA [employer]_ [id number] Right 1 Implanted       INDICATIONS: The patient is an 63 yrs female with progressive debilitating right knee pain due to severe osteoarthritis. Symptoms have progressed despite comprehensive conservative treatment and the patient presents for right total knee replacement. Risks, benefits, alternatives of the procedure were reviewed in detail and the patient elects to proceed. The patient understands the risk for perioperative medical complications. DESCRIPTION OF PROCEDURE: Spinal anesthesia was initiated. Preoperative dose of antibiotic was given. An catheter was not placed.  The right lower extremity was prepped and draped in the usual sterile fashion. The skin was covered with Ioban occlusive dressing. The right lower extremity was exsanguinated. A medial parapatellar incision was made to the right knee. The right knee was exposed and the distal femur was cut at 5 degrees distal femoral valgus. Femur was sized for a size 8. An AP cutting block was placed, rotated based on bony landmarks. Femoral cuts were completed for a size 8. The tibia was subluxed and a neutral varus/valgus proximal tibial cut was made matching the patient's slope. The meniscal remnants were removed. The posterior osteophytes were removed from the femur. Gaps were examined. The flexion and extension gaps were 16 mm. The femur was finished for a 8, tibia for a E with stem. Trials were placed. Patella was debrided . The knee tracked well with all trial implants. The trials were then removed. Bony surfaces were drilled, lavaged, and dried. All components were cemented. Excess cement was removed. A 16 polyethylene component was placed. After the cement had fully cured, the knee was ranged and  irrigated copiously with pulsatile lavage. All surrounding soft tissues were infiltrated with local anesthetic. The arthrotomy was closed with #2 Vicryl sutures and 0 Vicryl sutures. Skin and subcutaneous tissues were irrigated and closed in standard fashion. Sterile dressing was applied. There were no complications. The procedure was a RIGHT TOTAL KNEE REPLACEMENT. A [place] total knee construct was utilized. No specimens were obtained/sent. The patient was transferred to the recovery room in stable condition. Extreme arthritis. Varus release. James Smith PA-C was critical throughout the case to assist with positioning, retraction and closure. There were no other available residents or surgical assistants to assist during this procedure.       Job Russell MD

## 2021-07-20 NOTE — ANESTHESIA POSTPROCEDURE EVALUATION
Procedure(s):  RIGHT TOTAL KNEE ARTHROPLASTY. spinal    Anesthesia Post Evaluation        Patient participation: complete - patient participated  Level of consciousness: awake  Pain management: adequate  Airway patency: patent  Anesthetic complications: no  Cardiovascular status: hemodynamically stable  Respiratory status: acceptable  Hydration status: acceptable  Comments: The patient is ready for PACU discharge. Shirley Pollock DO                   Post anesthesia nausea and vomiting:  controlled      INITIAL Post-op Vital signs:   Vitals Value Taken Time   /77 07/20/21 1016   Temp 36.8 °C (98.2 °F) 07/20/21 1016   Pulse 72 07/20/21 1019   Resp 15 07/20/21 1019   SpO2 100 % 07/20/21 1019   Vitals shown include unvalidated device data.

## 2021-07-20 NOTE — ANESTHESIA PROCEDURE NOTES
Spinal Block    Performed by: Lexa Serrato DO  Authorized by: Lexa Serrato DO     Pre-procedure:   Indications: primary anesthetic  Preanesthetic Checklist: patient identified, risks and benefits discussed, anesthesia consent, site marked, patient being monitored and timeout performed      Spinal Block:   Patient Position:  Seated  Prep Region:  Lumbar  Prep: DuraPrep and patient draped      Location:  L3-4  Technique:  Single shot        Needle:   Needle Type:  Pencan  Needle Gauge:  24 G  Attempts:  1      Events: CSF confirmed, no blood with aspiration and no paresthesia        Assessment:  Insertion:  Uncomplicated  Patient tolerance:  Patient tolerated the procedure well with no immediate complications

## 2021-07-20 NOTE — ANESTHESIA PROCEDURE NOTES
Spinal Block    Performed by: Edna Lassiter CRNA  Authorized by: Edna Lassiter CRNA     Pre-procedure:  Preanesthetic Checklist: patient identified, risks and benefits discussed, anesthesia consent, site marked, patient being monitored and timeout performed

## 2021-07-21 LAB
ANION GAP SERPL CALC-SCNC: 8 MMOL/L (ref 5–15)
BUN SERPL-MCNC: 24 MG/DL (ref 6–20)
BUN/CREAT SERPL: 34 (ref 12–20)
CALCIUM SERPL-MCNC: 8.5 MG/DL (ref 8.5–10.1)
CHLORIDE SERPL-SCNC: 108 MMOL/L (ref 97–108)
CO2 SERPL-SCNC: 25 MMOL/L (ref 21–32)
CREAT SERPL-MCNC: 0.71 MG/DL (ref 0.55–1.02)
GLUCOSE SERPL-MCNC: 172 MG/DL (ref 65–100)
HGB BLD-MCNC: 9.2 G/DL (ref 11.5–16)
INR PPP: 1 (ref 0.9–1.1)
POTASSIUM SERPL-SCNC: 3.4 MMOL/L (ref 3.5–5.1)
PROTHROMBIN TIME: 10.9 SEC (ref 9–11.1)
SODIUM SERPL-SCNC: 141 MMOL/L (ref 136–145)

## 2021-07-21 PROCEDURE — 97110 THERAPEUTIC EXERCISES: CPT

## 2021-07-21 PROCEDURE — 85610 PROTHROMBIN TIME: CPT

## 2021-07-21 PROCEDURE — 74011250636 HC RX REV CODE- 250/636: Performed by: PHYSICIAN ASSISTANT

## 2021-07-21 PROCEDURE — 74011250637 HC RX REV CODE- 250/637: Performed by: PHYSICIAN ASSISTANT

## 2021-07-21 PROCEDURE — 74011000250 HC RX REV CODE- 250: Performed by: PHYSICIAN ASSISTANT

## 2021-07-21 PROCEDURE — 97165 OT EVAL LOW COMPLEX 30 MIN: CPT

## 2021-07-21 PROCEDURE — 97530 THERAPEUTIC ACTIVITIES: CPT

## 2021-07-21 PROCEDURE — 85018 HEMOGLOBIN: CPT

## 2021-07-21 PROCEDURE — 97116 GAIT TRAINING THERAPY: CPT

## 2021-07-21 PROCEDURE — 36415 COLL VENOUS BLD VENIPUNCTURE: CPT

## 2021-07-21 PROCEDURE — 74011250637 HC RX REV CODE- 250/637: Performed by: STUDENT IN AN ORGANIZED HEALTH CARE EDUCATION/TRAINING PROGRAM

## 2021-07-21 PROCEDURE — 94762 N-INVAS EAR/PLS OXIMTRY CONT: CPT

## 2021-07-21 PROCEDURE — 80048 BASIC METABOLIC PNL TOTAL CA: CPT

## 2021-07-21 RX ORDER — WARFARIN SODIUM 5 MG/1
5 TABLET ORAL ONCE
Status: COMPLETED | OUTPATIENT
Start: 2021-07-21 | End: 2021-07-21

## 2021-07-21 RX ADMIN — Medication 10 ML: at 21:38

## 2021-07-21 RX ADMIN — GABAPENTIN 300 MG: 300 CAPSULE ORAL at 15:20

## 2021-07-21 RX ADMIN — PSYLLIUM HUSK 1 PACKET: 3.4 POWDER ORAL at 09:28

## 2021-07-21 RX ADMIN — ACETAMINOPHEN 1000 MG: 500 TABLET ORAL at 21:38

## 2021-07-21 RX ADMIN — ACETAMINOPHEN 1000 MG: 500 TABLET ORAL at 15:20

## 2021-07-21 RX ADMIN — OXYCODONE 10 MG: 5 TABLET ORAL at 23:26

## 2021-07-21 RX ADMIN — OXYCODONE 10 MG: 5 TABLET ORAL at 09:28

## 2021-07-21 RX ADMIN — TRAMADOL HYDROCHLORIDE 100 MG: 50 TABLET, COATED ORAL at 12:08

## 2021-07-21 RX ADMIN — TRAMADOL HYDROCHLORIDE 100 MG: 50 TABLET, COATED ORAL at 21:48

## 2021-07-21 RX ADMIN — FAMOTIDINE 20 MG: 20 TABLET, FILM COATED ORAL at 09:29

## 2021-07-21 RX ADMIN — ZOLPIDEM TARTRATE 5 MG: 5 TABLET ORAL at 23:43

## 2021-07-21 RX ADMIN — ACETAMINOPHEN 1000 MG: 500 TABLET ORAL at 09:28

## 2021-07-21 RX ADMIN — HYDROCHLOROTHIAZIDE 50 MG: 25 TABLET ORAL at 09:28

## 2021-07-21 RX ADMIN — OXYCODONE 10 MG: 5 TABLET ORAL at 02:34

## 2021-07-21 RX ADMIN — DOCUSATE SODIUM 50 MG AND SENNOSIDES 8.6 MG 1 TABLET: 8.6; 5 TABLET, FILM COATED ORAL at 18:58

## 2021-07-21 RX ADMIN — OXYCODONE HYDROCHLORIDE 5 MG: 5 TABLET ORAL at 19:54

## 2021-07-21 RX ADMIN — ACETAMINOPHEN 1000 MG: 500 TABLET ORAL at 02:34

## 2021-07-21 RX ADMIN — OXYCODONE HYDROCHLORIDE 5 MG: 5 TABLET ORAL at 18:57

## 2021-07-21 RX ADMIN — SODIUM CHLORIDE 125 ML/HR: 9 INJECTION, SOLUTION INTRAVENOUS at 00:10

## 2021-07-21 RX ADMIN — CEFAZOLIN SODIUM 2 G: 1 INJECTION, POWDER, FOR SOLUTION INTRAMUSCULAR; INTRAVENOUS at 00:10

## 2021-07-21 RX ADMIN — LOSARTAN POTASSIUM 100 MG: 50 TABLET, FILM COATED ORAL at 09:28

## 2021-07-21 RX ADMIN — GABAPENTIN 300 MG: 300 CAPSULE ORAL at 09:28

## 2021-07-21 RX ADMIN — VERAPAMIL HYDROCHLORIDE 240 MG: 240 TABLET, FILM COATED, EXTENDED RELEASE ORAL at 21:37

## 2021-07-21 RX ADMIN — WARFARIN SODIUM 5 MG: 5 TABLET ORAL at 12:08

## 2021-07-21 RX ADMIN — GABAPENTIN 300 MG: 300 CAPSULE ORAL at 21:38

## 2021-07-21 RX ADMIN — KETOROLAC TROMETHAMINE 15 MG: 30 INJECTION, SOLUTION INTRAMUSCULAR; INTRAVENOUS at 09:28

## 2021-07-21 RX ADMIN — KETOROLAC TROMETHAMINE 15 MG: 30 INJECTION, SOLUTION INTRAMUSCULAR; INTRAVENOUS at 02:35

## 2021-07-21 RX ADMIN — POLYETHYLENE GLYCOL 3350 17 G: 17 POWDER, FOR SOLUTION ORAL at 09:27

## 2021-07-21 RX ADMIN — DOCUSATE SODIUM 50 MG AND SENNOSIDES 8.6 MG 1 TABLET: 8.6; 5 TABLET, FILM COATED ORAL at 09:28

## 2021-07-21 NOTE — PROGRESS NOTES
1130:  TRANSFER - IN REPORT:    Verbal report received from Hayder Nj RN (name) on Agustín Ansari  being received from PACU (unit) for routine post - op    Report consisted of patients Situation, Background, Assessment and   Recommendations(SBAR). Information from the following report(s) SBAR, Kardex, OR Summary, Intake/Output, MAR and Recent Results was reviewed with the receiving nurse. Opportunity for questions and clarification was provided. Assessment completed upon patients arrival to unit and care assumed. 2015:  Bedside shift change report given to Negra Stuart RN (oncoming nurse) by Tong Carbajal RN (offgoing nurse). Report included the following information SBAR, Kardex, Intake/Output, MAR and Recent Results. Primary Nurse Daya Gifford and Delaney Locke RN performed a dual skin assessment on this patient. No impairment noted but surgical dressing to R knee. Oracio score is 20.

## 2021-07-21 NOTE — PROGRESS NOTES
Occupational Therapy    Orders received, chart reviewed and patient evaluated by occupational therapy. Pending progression with skilled acute occupational therapy, recommend: To be determined: HH vs none for OT services with assist from spouse     Recommend with nursing ADLs with supervision/setup, OOB to chair 3x/day and toileting via beside commode mod assist and walker. Thank you for completing as able in order to maintain patient strength, endurance and independence. Full evaluation to follow.          Zac Hein, OT

## 2021-07-21 NOTE — PROGRESS NOTES
Note: FUNCTIONAL STATUS PRIOR TO ADMISSION: Patient was modified independent using a single point cane for functional mobility. HOME SUPPORT PRIOR TO ADMISSION: The patient lived with  but did not require assist.     Physical Therapy Goals  Initiated 7/20/2021     1. Patient will move from supine to sit and sit to supine  and scoot up and down in bed with modified independence within 4 days. 2. Patient will perform sit to stand with modified independence within 4 days. 3. Patient will ambulate with modified independence for > 150 feet with the least restrictive device within 4 days. 4. Patient will ascend/descend 4 stairs with one handrail(s) with modified independence within 4 days. 5. Patient will perform home exercise program per protocol with supervision/set-up within 4 days. 6. Patient will demonstrate AROM 0-90 degrees in operative joint within 4 days. Problem: Mobility Impaired (Adult and Pediatric)  Goal: *Acute Goals and Plan of Care (Insert Text)  Outcome: Not Progressing Towards Goal    PHYSICAL THERAPY TREATMENT  Patient: Vanessa Hill (63 y.o. female)  Date: 7/21/2021  Diagnosis: Primary osteoarthritis of right knee [M17.11]  Localized osteoarthritis of right knee [M17.11] <principal problem not specified>  Procedure(s) (LRB):  RIGHT TOTAL KNEE ARTHROPLASTY (Right) 1 Day Post-Op  Precautions: WBAT, Fall  Chart, physical therapy assessment, plan of care and goals were reviewed. ASSESSMENT  Patient continues with skilled PT services and is not progressing towards goals. Patient continues to demonstrate limited functional mobility and decreased independence secondary to impaired standing balance, generalized weakness, decreased R knee ROM and strength, increased R knee edema and pain, impaired gait mechanics, and decreased activity tolerance. Received supine in bed and agreeable to PT intervention.  Pt with decreased safety awareness and lacks insight into current functional deficits, requiring significant cueing for safety during mobility and with use of RW. Marked forward flexed trunk and occasional R knee buckling noted during short distance gait from bed>BSC>bedside chair. Pt with significant difficulty progressing RLE, frequently dragging RLE to step forward. VSS throughout activity. Pt with c/o fatigue and declined further gait training. Pt was left sitting in bedside chair with all needs met and RN aware following therapy session. Recommend patient discharge home with HHPT, 24/7 assistance, and use of RW pending progress in acute PT. Current Level of Function Impacting Discharge (mobility/balance): SBA for bed mobility; min A for transfers and gait with RW support    Other factors to consider for discharge: increased risk for falls; decline from baseline mobility; decreased safety awareness; pain management         PLAN :  Patient continues to benefit from skilled intervention to address the above impairments. Continue treatment per established plan of care. to address goals. Recommendation for discharge: (in order for the patient to meet his/her long term goals)  Physical therapy at least 2 days/week in the home AND ensure assist and/or supervision for safety with mobility and ADLs    This discharge recommendation:  Has been made in collaboration with the attending provider and/or case management    IF patient discharges home will need the following DME: patient owns DME required for discharge       SUBJECTIVE:   Patient stated I'm just going to get up and go to that bathroom.     OBJECTIVE DATA SUMMARY:   Critical Behavior:  Neurologic State: Alert  Orientation Level: Oriented X4  Cognition: Impaired decision making, Poor safety awareness  Safety/Judgement: Awareness of environment  Functional Mobility Training:  Bed Mobility:     Supine to Sit: Stand-by assistance; Additional time     Scooting: Stand-by assistance; Additional time        Transfers:  Sit to Stand: Minimum assistance; Additional time  Stand to Sit: Minimum assistance; Additional time        Bed to Chair: Minimum assistance; Additional time; Adaptive equipment (use of RW)                    Balance:  Sitting: Intact; Without support  Standing: Impaired; With support  Standing - Static: Fair;Constant support;Good  Standing - Dynamic : Fair;Constant support  Ambulation/Gait Training:  Distance (ft): 5 Feet (ft)  Assistive Device: Gait belt;Walker, rolling  Ambulation - Level of Assistance: Minimal assistance; Additional time; Adaptive equipment        Gait Abnormalities: Antalgic;Decreased step clearance; Step to gait (increased trunk flexion)  Right Side Weight Bearing: As tolerated  Left Side Weight Bearing: Full  Base of Support: Center of gravity altered;Shift to left  Stance: Right decreased  Speed/Elisa: Slow;Shuffled;Pace decreased (<100 feet/min)  Step Length: Left shortened;Right shortened  Swing Pattern: Right asymmetrical       Pain Ratin-6/10 R knee pain    Activity Tolerance:   Poor and requires frequent rest breaks    After treatment patient left in no apparent distress:   Sitting in chair and Call bell within reach    COMMUNICATION/COLLABORATION:   The patients plan of care was discussed with: Physical therapist, Occupational therapist, and Registered nurse.      Chico Turner PT, DPT   Time Calculation: 39 mins

## 2021-07-21 NOTE — PROGRESS NOTES
Bedside shift change report given to Travon Connelly (oncoming nurse) by Remy Sharma (offgoing nurse). Report included the following information SBAR, Kardex, Intake/Output, MAR and Recent Results.

## 2021-07-21 NOTE — PROGRESS NOTES
ORTHO PROGRESS NOTE    2021  Admit Date:   2021        Subjective:    Domonique Parra is a 63 y.o. BLACK/ female who is 1 Day Post-Op Procedure(s):  RIGHT TOTAL KNEE ARTHROPLASTY. The patient states moderate pain, otherwise without c/o at this time. The patient denies CP, SOB, N/V. Vital Signs:    Patient Vitals for the past 8 hrs:   BP Temp Pulse Resp SpO2   21 0455 -- -- -- -- 95 %   21 0231 129/79 97.8 °F (36.6 °C) 73 16 94 %     Temp (24hrs), Av.7 °F (36.5 °C), Min:97.2 °F (36.2 °C), Max:98.2 °F (36.8 °C)      Pain Control:   Pain Assessment  Pain Scale 1: Numeric (0 - 10)  Pain Intensity 1: 0  Pain Onset 1: Post op  Pain Location 1: Knee  Pain Orientation 1: Right  Pain Description 1: Aching  Pain Intervention(s) 1: Medication (see MAR)    LAB:    Recent Labs     21  0416   HGB 9.2*   INR 1.0      K 3.4*      CO2 25   BUN 24*   CREA 0.71   *       Assessment & Physician's Comment:  A&O X3, NAD  Respirations unlabored  Abdomen S/NT  DF/EHL/PF intact  Distal pulses intact  Calves S/NT, SILT bilateral lower extremities  Dressing is clean, dry, and intact    Procedure:  Procedure(s):  RIGHT TOTAL KNEE ARTHROPLASTY    Plan:  1) Pain Control: Adequate, continue current regimen. 2) Hemodynamics: Stable. Will continue to monitor. 3) Wound: Change dressing if saturated. 4) Activity: WBAT, mobilize with assist.  5) DVT Prophylaxis: Coumadin, SCD's, encourage ankle pump exercise, mobilize. 6) Disposition: Case management for discharge planning. Plan on home today/tomorrow depending upon progress mobilizing with PT. Will continue to follow progress closely.         Mayela Canales PA-C

## 2021-07-21 NOTE — PROGRESS NOTES
AUGUSTINE: The patient plans to discharge to home with , Silverio Owusu ([phone]) to transport and she will continue outpatient therapy with [facility] that is already established in [place]. RUR: N/A    1. The patient is from home and lives with her . 2. Orthopedics, PT/OT following. 3. The patient plans to discharge home and continue outpatient therapy. Observation notice provided in writing to patient and/or caregiver as well as verbal explanation of the policy. Patients who are in outpatient status also receive the Observation notice. Patient has received notice and or patient representative has received via secure email, fax, or certified mail based on patient representative's preference. Care Management Interventions  PCP Verified by CM: Yes  Palliative Care Criteria Met (RRAT>21 & CHF Dx)?: No  Mode of Transport at Discharge: Other (see comment)  Transition of Care Consult (CM Consult): [address]: No  Reason Outside [place]:  (The patient refusing home health. She wants to continue outpatient therapy.)  MyChart Signup: Yes  Discharge Durable Medical Equipment: No  Health Maintenance Reviewed: Yes  Physical Therapy Consult: Yes  Occupational Therapy Consult: Yes  Speech Therapy Consult: No  Current Support Network: Lives with Spouse  Confirm Follow Up Transport: Family  The Plan for Transition of Care is Related to the Following Treatment Goals : The patient plans to discharge home with home health and family to transport.   The Patient and/or Patient Representative was Provided with a Choice of Provider and Agrees with the Discharge Plan?: Yes  Freedom of Choice List was Provided with Basic Dialogue that Supports the Patient's Individualized Plan of Care/Goals, Treatment Preferences and Shares the Quality Data Associated with the Providers?: Yes   Resource Information Provided?: No  Discharge Location  Discharge Placement: Home with home health Reason for Admission: Right Total Knee                      RUR Score: N/A                    Plan for utilizing home health:  [facility]. The patient will continue outpatient therapy already established. PCP: First and Last name:  Johnson Ortiz MD, phone: [phone]     Name of Practice:    Are you a current patient: Yes/No: Yes   Approximate date of last visit: July, 2021   Can you participate in a virtual visit with your PCP: Yes                    Current Advanced Directive/Advance Care Plan: Full Code      Healthcare Decision Maker:   Click here to complete OpenCurriculum including selection of the Healthcare Decision Maker Relationship (ie \"Primary\")                        Transition of Care Plan:       CM met with patient in room 556. The patient is alert and oriented x4. Confirmed demographics. The patient is independent with ADL's/IADL's, owns her own walker, drives a vehicle and uses [facility] and [address] [place] [address]. The patient has outpatient therapy already established with [facility] and wants to continue this plan and refuses home health. The patient is on warfarin medication and will require lab draws to monitor. MAURICE notified the orthopedics PA on the floor and he will establish a place for the patient to have outpatient lab draws done. The patient plans to discharge home with outpatient therapy and lab draws with [facility]. The patient's  will transport home when stable for discharge.     Neyda Medina RN/CRM

## 2021-07-21 NOTE — PROGRESS NOTES
Pharmacist Note - Warfarin Dosing  Consult provided for this 63 y.o. female to manage warfarin for Orthopedic Surgery (VTE prophylaxis)    INR Goal: 1.7- 2.2    Therapy Day: 2    Drugs that may increase INR:None  Drugs that may decrease INR: None  Other current anticoagulants/ drugs that may increase bleeding risk: None  Risk factors: None  Daily INR ordered: YES    Recent Labs     07/21/21  0416   HGB 9.2*   INR 1.0     Date               INR                 Dose  7/20  --  5 mg  7/21  1.0  5 mg             Assessment/ Plan: Will order warfarin 5 mg PO x 1 dose. Pharmacy will continue to monitor daily and adjust therapy as indicated. Please contact the pharmacist at  or  for discharge recommendations if needed.

## 2021-07-21 NOTE — PROGRESS NOTES
Problem: Mobility Impaired (Adult and Pediatric)  Goal: *Acute Goals and Plan of Care (Insert Text)  7/21/2021 1522 by Luz Min PT  Outcome: Progressing Towards Goal  PHYSICAL THERAPY TREATMENT  Patient: Nereyda Ansari (63 y.o. female)  Date: 7/21/2021  Diagnosis: Primary osteoarthritis of right knee [M17.11]  Localized osteoarthritis of right knee [M17.11] <principal problem not specified>  Procedure(s) (LRB):  RIGHT TOTAL KNEE ARTHROPLASTY (Right) 1 Day Post-Op  Precautions: WBAT, Fall  Chart, physical therapy assessment, plan of care and goals were reviewed. ASSESSMENT  Patient continues with skilled PT services and is slowly progressing towards goals. Patient continues to demonstrate limited functional mobility and decreased independence secondary to impaired standing balance, generalized weakness, increased R knee edema and pain, decreased R knee ROM and strength, impaired gait mechanics, and decreased activity tolerance. Received supine in bed with spouse at bedside and agreeable to PT intervention. Exhibits improved safety awareness and awareness for need for assist during PM session. Reported 6/10 R knee pain, however had been pre-medicated by nursing for pain. Continues to require significant time to complete all mobility. Needs VCs for safe hand placement with use of RW during transfers. Gait speed is non-functional with step-to and antalgic gait pattern with use of RW. Continues to demonstrate difficulty progressing RLE forward, requiring constant VCs for sequencing with use of RW and for weight shift to L to assist in progressing RLE. Required frequent assist for RW management during gait. Gait distance limited by increased R knee pain and fatigue. Returned supine in bed and was instructed on exercises as noted below. Pt was left supine in bed with all needs met, RN aware, ice on R knee, and spouse present following therapy session.  Continue to recommend patient discharge home with HHPT, use of RW, and initial 24/7 assistance pending progress in acute PT. Current Level of Function Impacting Discharge (mobility/balance): SBA/min A for bed mobility; min/CGA for transfers and gait training with RW support    Other factors to consider for discharge: increased risk for falls; decline from baseline mobility; pain management         PLAN :  Patient continues to benefit from skilled intervention to address the above impairments. Continue treatment per established plan of care. to address goals. Recommendation for discharge: (in order for the patient to meet his/her long term goals)  Physical therapy at least 2 days/week in the home AND ensure assist and/or supervision for safety with mobility and ADLs    This discharge recommendation:  Has been made in collaboration with the attending provider and/or case management    IF patient discharges home will need the following DME: patient owns DME required for discharge       SUBJECTIVE:   Patient stated I sat up for about two hours.     OBJECTIVE DATA SUMMARY:   Critical Behavior:  Neurologic State: Alert  Orientation Level: Oriented X4  Cognition: Follows commands, Appropriate safety awareness, Appropriate for age attention/concentration, Appropriate decision making  Safety/Judgement: Awareness of environment  Functional Mobility Training:  Bed Mobility:     Supine to Sit: Stand-by assistance; Additional time  Sit to Supine: Minimum assistance; Additional time (for RLE)  Scooting: Stand-by assistance; Additional time        Transfers:  Sit to Stand: Minimum assistance;Assist x1;Additional time (bed height elevated)  Stand to Sit: Contact guard assistance; Additional time        Bed to Chair: Minimum assistance; Additional time; Adaptive equipment (use of RW)                    Balance:  Sitting: Intact; Without support  Standing: Impaired; With support  Standing - Static: Good;Constant support  Standing - Dynamic : Fair;Constant support  Ambulation/Gait Training:  Distance (ft): 20 Feet (ft)  Assistive Device: Gait belt;Walker, rolling  Ambulation - Level of Assistance: Contact guard assistance;Minimal assistance; Additional time; Adaptive equipment;Assist x1        Gait Abnormalities: Antalgic;Decreased step clearance; Step to gait (mild trunk flexion)  Right Side Weight Bearing: As tolerated  Left Side Weight Bearing: Full  Base of Support: Center of gravity altered;Shift to left  Stance: Right decreased  Speed/Elisa: Slow;Shuffled;Pace decreased (<100 feet/min)  Step Length: Left shortened;Right shortened  Swing Pattern: Right asymmetrical      Therapeutic Exercises: Ankle pumps 15x BLE  Supine quad sets 5x BLE  Supine heel slides 5x BLE  Reviewed seated heel slides and LAQ  Pain Ratin/10 R knee pain    Activity Tolerance:   Fair and requires rest breaks    After treatment patient left in no apparent distress:   Supine in bed, Call bell within reach, Caregiver / family present, and Side rails x 3    COMMUNICATION/COLLABORATION:   The patients plan of care was discussed with: Physical therapist, Occupational therapist, and Registered nurse.      Miriam Fletcher, PT, DPT   Time Calculation: 58 mins

## 2021-07-21 NOTE — PROGRESS NOTES
Problem: Self Care Deficits Care Plan (Adult)  Goal: *Acute Goals and Plan of Care (Insert Text)  Description: FUNCTIONAL STATUS PRIOR TO ADMISSION: Patient was independent and active without use of DME. She worked at [employer] [employer] by [employer]. HOME SUPPORT: The patient lived with  but did not require assist.    Occupational Therapy Goals  Initiated 7/21/2021    1. Patient will perform lower body dressing at supervision/set-up within 7 days. 2. Patient will perform toilet transfers at supervision/set-up using Walkers, Type: Rolling Walker within 7 days. 3. Patient will perform all aspects of toileting at supervision/set-up within 7 days. 4. Patient will tolerate greater than 5 minutes of standing without a rest break at supervision/set-up using Walkers, Type: Rolling Walker during ADL's within 7 days. Outcome: Progressing Towards Goal   OCCUPATIONAL THERAPY EVALUATION  Patient: Vanessa Hill (63 y.o. female)  Date: 7/21/2021  Primary Diagnosis: Primary osteoarthritis of right knee [M17.11]  Localized osteoarthritis of right knee [M17.11]  Procedure(s) (LRB):  RIGHT TOTAL KNEE ARTHROPLASTY (Right) 1 Day Post-Op   Precautions:   WBAT, Fall    ASSESSMENT  Based on the objective data described below, the patient presents with impaired mobility, standing balance, generalized strength and R knee ROM and pain s/p R TKA, POD 1. Received in chair and had received pain meds prior to session, and requesting to return to bed. Provided education on RW use, home safety, knee precautions with fair understanding. Previously up to [facility] and then had lunch in chair. Completed sit <> stand with min A from therapist and  reaching for gait belt to assist, reporting he provided \"heavy assistance\" with sit > stand. Once standing completed SPT with RW to bed with slow pace and difficulty with R foot clearance as she dragged it. Good static sitting balance at EOB.  Highly motivated to complete sit > supine, min A for RLE to lift last inch to bed. Ed on positioning of RLE in bed and chair to maximize functional return. Recommend next session for dressing review with AD and toileting at RW level in bathroom. Will need ed safety and sequencing with tub transfers. Current Level of Function Impacting Discharge (ADLs/self-care): UB care set up, LB care max A, and self care transfer min A with RW    Functional Outcome Measure: The patient scored 45/100 on the Barthel Index outcome measure which is indicative of moderate impairment with ADL tasks. Other factors to consider for discharge: Anticipate good progression with additional acute OT services with plans for dc home with support from . Patient will benefit from skilled therapy intervention to address the above noted impairments. PLAN :  Recommendations and Planned Interventions: self care training, functional mobility training, therapeutic exercise, balance training, therapeutic activities, endurance activities, patient education, home safety training, and family training/education    Frequency/Duration: Patient will be followed by occupational therapy 5 times a week to address goals. Recommendation for discharge: (in order for the patient to meet his/her long term goals)  To be determined: HH vs none with 24 hour assist from     This discharge recommendation:  Has been made in collaboration with the attending provider and/or case management    IF patient discharges home will need the following DME: AE: long handled bathing, AE: long handled dressing--PRN, walker: rolling,       SUBJECTIVE:   Patient stated I need to get back to bed.     OBJECTIVE DATA SUMMARY:   HISTORY:   Past Medical History:   Diagnosis Date    Chronic constipation     Hypertension     Obesity, Class II, BMI 35-39.9     Pre-diabetes 03/28/2019    A1C- 6.1    PVC (premature ventricular contraction)     States never been to cardio- has had for many years     Past Surgical History:   Procedure Laterality Date    HX  SECTION N/A     x 3    HX COLONOSCOPY      HX KNEE REPLACEMENT Left 2019    HX TONSILLECTOMY      HX WISDOM TEETH EXTRACTION Bilateral        Expanded or extensive additional review of patient history:     Home Situation  Home Environment: Private residence  # Steps to Enter: 5  Rails to Enter: Yes  Hand Rails : Bilateral (cant reach both at same time)  One/Two Story Residence: Two story, live on 1st floor  # of Interior Steps: 16  Interior Rails: Right  Living Alone: No  Support Systems: Spouse/Significant Other/Partner  Patient Expects to be Discharged to[de-identified] [employer]  Current DME Used/Available at Home: Walker, rolling, Shower chair  Tub or Shower Type: Tub/Shower combination      EXAMINATION OF PERFORMANCE DEFICITS:  Cognitive/Behavioral Status:  Neurologic State: Alert  Orientation Level: Oriented X4  Cognition: Follows commands; Appropriate safety awareness; Appropriate for age attention/concentration; Appropriate decision making                  Range of Motion:  AROM: Generally decreased, functional                         Strength:  Strength: Generally decreased, functional                Coordination:  Coordination: Generally decreased, functional  Fine Motor Skills-Upper: Left Intact; Right Intact    Gross Motor Skills-Upper: Left Intact; Right Intact    Tone & Sensation:  Tone: Normal  Sensation: Intact             Balance:  Sitting: Intact; Without support  Standing: Impaired; With support  Standing - Static: Good;Constant support  Standing - Dynamic : Fair;Constant support    Functional Mobility and Transfers for ADLs:  Bed Mobility:  Supine to Sit: Stand-by assistance; Additional time  Sit to Supine: Minimum assistance; Additional time (for RLE)  Scooting: Stand-by assistance; Additional time    Transfers:  Sit to Stand: Maximum assistance (OT assisting approx min A,  active in assisting)  Stand to Sit: Contact guard assistance; Additional time  Bed to Chair: Minimum assistance; Additional time (RW)    ADL Assessment:  Feeding: Setup    Oral Facial Hygiene/Grooming: Setup;Supervision (seated only)    Bathing: Maximum assistance    Upper Body Dressing: Setup    Lower Body Dressing: Maximum assistance    Toileting: Maximum assistance                ADL Intervention and task modifications:   Patient instructed and indicated understanding the benefits of maintaining activity tolerance, functional mobility, and independence with self care tasks during acute stay  to ensure safe return home and to baseline. Encouraged patient to increase frequency and duration OOB, be out of bed for all meals, perform daily ADLs (as approved by RN/MD regarding bathing etc), and performing functional mobility to/from bathroom. Provided education through multi-modal cues for RW safety including proper positioning, hand placement,  and safety. Patient able to perform sit <> stand with approx max A from chair. Fair understanding of RW use and safety. Dressing joint: Patient instructed  to don/doff Right LE first/last with min cues. Patient instructed and demonstrated to don all clothing while sitting prior to standing, doff all clothing to knees while standing, then sit to doff clothing off from knees to feet in order to facilitate fall prevention, pain management, and energy conservation. Home safety: Patient instructed and indicated understanding on home modifications and safety (raise height of ADL objects, appropriate height of chair surfaces, recliner safety, change of floor surfaces, clear pathways) to increase independence and fall prevention. Functional Measure:  Barthel Index:    Bathin  Bladder: 5  Bowels: 10  Groomin  Dressin  Feeding: 10  Mobility: 0  Stairs: 0  Toilet Use: 5  Transfer (Bed to Chair and Back): 10  Total: 45/100        The Barthel ADL Index: Guidelines  1.  The index should be used as a record of what a patient does, not as a record of what a patient could do. 2. The main aim is to establish degree of independence from any help, physical or verbal, however minor and for whatever reason. 3. The need for supervision renders the patient not independent. 4. A patient's performance should be established using the best available evidence. Asking the patient, friends/relatives and nurses are the usual sources, but direct observation and common sense are also important. However direct testing is not needed. 5. Usually the patient's performance over the preceding 24-48 hours is important, but occasionally longer periods will be relevant. 6. Middle categories imply that the patient supplies over 50 per cent of the effort. 7. Use of aids to be independent is allowed. Pino Carlton., Barthel, D.W. (7957). Functional evaluation: the Barthel Index. [facility] (14)2. Anil Andrews jf ELA Ríos, Renée Hargrove., Ibrahima Kellogg., Buffy Figures, [facility] (1999). Measuring the change indisability after inpatient rehabilitation; comparison of the responsiveness of the Barthel Index and Functional [place] Measure. Journal of Neurology, Neurosurgery, and Psychiatry, 66(4), 633-353. Alanna Catalan, N.J.A, Serenity Burgess,  ARGELIA.J.CIELO, & Alena Chaudhary, MALFA. (2004.) Assessment of post-stroke quality of life in cost-effectiveness studies: The usefulness of the Barthel Index and the EuroQoL-5D.  Quality of Life Research, 15, 273-11         Occupational Therapy Evaluation Charge Determination   History Examination Decision-Making   LOW Complexity : Brief history review  LOW Complexity : 1-3 performance deficits relating to physical, cognitive , or psychosocial skils that result in activity limitations and / or participation restrictions  LOW Complexity : No comorbidities that affect functional and no verbal or physical assistance needed to complete eval tasks       Based on the above components, the patient evaluation is determined to be of the following complexity level: LOW   Pain Ratin/10 in R knee    Activity Tolerance:   Fair    After treatment patient left in no apparent distress:    Supine in bed, Call bell within reach, and Caregiver / family present    COMMUNICATION/EDUCATION:   The patients plan of care was discussed with: Physical therapist and Registered nurse. Home safety education was provided and the patient/caregiver indicated understanding. and Patient/family agree to work toward stated goals and plan of care. This patients plan of care is appropriate for delegation to [facility].     Thank you for this referral.  Paris Rodriguez OT  Time Calculation: 35 mins

## 2021-07-22 VITALS
OXYGEN SATURATION: 97 % | BODY MASS INDEX: 44.53 KG/M2 | SYSTOLIC BLOOD PRESSURE: 128 MMHG | HEART RATE: 62 BPM | WEIGHT: 260.8 LBS | DIASTOLIC BLOOD PRESSURE: 70 MMHG | RESPIRATION RATE: 17 BRPM | HEIGHT: 64 IN | TEMPERATURE: 98.1 F

## 2021-07-22 LAB
INR PPP: 1.2 (ref 0.9–1.1)
PROTHROMBIN TIME: 12.1 SEC (ref 9–11.1)

## 2021-07-22 PROCEDURE — 36415 COLL VENOUS BLD VENIPUNCTURE: CPT

## 2021-07-22 PROCEDURE — 2709999900 HC NON-CHARGEABLE SUPPLY

## 2021-07-22 PROCEDURE — 85610 PROTHROMBIN TIME: CPT

## 2021-07-22 PROCEDURE — 74011250637 HC RX REV CODE- 250/637: Performed by: PHYSICIAN ASSISTANT

## 2021-07-22 PROCEDURE — 97535 SELF CARE MNGMENT TRAINING: CPT

## 2021-07-22 PROCEDURE — 97116 GAIT TRAINING THERAPY: CPT

## 2021-07-22 PROCEDURE — 97530 THERAPEUTIC ACTIVITIES: CPT

## 2021-07-22 PROCEDURE — 97110 THERAPEUTIC EXERCISES: CPT

## 2021-07-22 RX ORDER — WARFARIN 2 MG/1
TABLET ORAL
Qty: 90 TABLET | Refills: 0 | Status: SHIPPED | OUTPATIENT
Start: 2021-07-22 | End: 2022-07-28 | Stop reason: ALTCHOICE

## 2021-07-22 RX ORDER — TRAMADOL HYDROCHLORIDE 50 MG/1
50 TABLET ORAL
Qty: 28 TABLET | Refills: 0 | Status: SHIPPED | OUTPATIENT
Start: 2021-07-22 | End: 2021-07-29

## 2021-07-22 RX ORDER — AMOXICILLIN 250 MG
1 CAPSULE ORAL 2 TIMES DAILY
Qty: 50 TABLET | Refills: 0 | Status: SHIPPED | OUTPATIENT
Start: 2021-07-22 | End: 2022-07-28 | Stop reason: ALTCHOICE

## 2021-07-22 RX ORDER — WARFARIN SODIUM 5 MG/1
5 TABLET ORAL ONCE
Status: COMPLETED | OUTPATIENT
Start: 2021-07-22 | End: 2021-07-22

## 2021-07-22 RX ORDER — OXYCODONE HYDROCHLORIDE 5 MG/1
5-10 TABLET ORAL
Qty: 50 TABLET | Refills: 0 | Status: SHIPPED | OUTPATIENT
Start: 2021-07-22 | End: 2021-07-29

## 2021-07-22 RX ORDER — NALOXONE HYDROCHLORIDE 4 MG/.1ML
SPRAY NASAL
Qty: 1 EACH | Refills: 0 | Status: SHIPPED | OUTPATIENT
Start: 2021-07-22

## 2021-07-22 RX ORDER — POLYETHYLENE GLYCOL 3350 17 G/17G
17 POWDER, FOR SOLUTION ORAL DAILY
Qty: 21 PACKET | Refills: 0 | Status: SHIPPED | OUTPATIENT
Start: 2021-07-22

## 2021-07-22 RX ADMIN — HYDROCHLOROTHIAZIDE 50 MG: 25 TABLET ORAL at 09:56

## 2021-07-22 RX ADMIN — GABAPENTIN 300 MG: 300 CAPSULE ORAL at 15:52

## 2021-07-22 RX ADMIN — OXYCODONE 10 MG: 5 TABLET ORAL at 06:49

## 2021-07-22 RX ADMIN — DOCUSATE SODIUM 50 MG AND SENNOSIDES 8.6 MG 1 TABLET: 8.6; 5 TABLET, FILM COATED ORAL at 09:57

## 2021-07-22 RX ADMIN — OXYCODONE 10 MG: 5 TABLET ORAL at 02:40

## 2021-07-22 RX ADMIN — ACETAMINOPHEN 1000 MG: 500 TABLET ORAL at 02:40

## 2021-07-22 RX ADMIN — LOSARTAN POTASSIUM 100 MG: 50 TABLET, FILM COATED ORAL at 10:01

## 2021-07-22 RX ADMIN — WARFARIN SODIUM 5 MG: 5 TABLET ORAL at 13:55

## 2021-07-22 RX ADMIN — FAMOTIDINE 20 MG: 20 TABLET, FILM COATED ORAL at 10:00

## 2021-07-22 RX ADMIN — POLYETHYLENE GLYCOL 3350 17 G: 17 POWDER, FOR SOLUTION ORAL at 09:55

## 2021-07-22 RX ADMIN — DOCUSATE SODIUM 50 MG AND SENNOSIDES 8.6 MG 1 TABLET: 8.6; 5 TABLET, FILM COATED ORAL at 17:23

## 2021-07-22 RX ADMIN — GABAPENTIN 300 MG: 300 CAPSULE ORAL at 10:00

## 2021-07-22 RX ADMIN — PSYLLIUM HUSK 1 PACKET: 3.4 POWDER ORAL at 09:57

## 2021-07-22 RX ADMIN — ACETAMINOPHEN 1000 MG: 500 TABLET ORAL at 10:00

## 2021-07-22 RX ADMIN — OXYCODONE 10 MG: 5 TABLET ORAL at 14:02

## 2021-07-22 RX ADMIN — Medication 10 ML: at 17:24

## 2021-07-22 RX ADMIN — OXYCODONE 10 MG: 5 TABLET ORAL at 09:55

## 2021-07-22 NOTE — PROGRESS NOTES
Chart reviewed, pt seen and cleared for discharge from PT standpoint. Patient is safe with supervision for ambulation with RW and CGA for stairs.     Full note to follow -   Marylin Ivey, PT

## 2021-07-22 NOTE — PROGRESS NOTES
Bedside shift change report given to Nicky Nava RN (oncoming nurse) by Robert Reynolds RN (offgoing nurse).  Report included the following information SBAR, Kardex, Summary, etc.

## 2021-07-22 NOTE — PROGRESS NOTES
Pharmacist Note - Warfarin Dosing  Consult provided for this 63 y.o. female to manage warfarin for Orthopedic Surgery (VTE prophylaxis)    INR Goal: 1.7- 2.2  Therapy Day: 3    Drugs that may increase INR:None  Drugs that may decrease INR: None  Other current anticoagulants/ drugs that may increase bleeding risk: None  Risk factors: None  Daily INR ordered: YES    Recent Labs     07/22/21  0403 07/21/21  0416   HGB  --  9.2*   INR 1.2* 1.0     Date               INR                 Dose  7/20  --  5 mg  7/21  1.0  5 mg  7/22  1.2  5 mg              Assessment/ Plan: Will order warfarin 5 mg PO x 1 dose. Pharmacy will continue to monitor daily and adjust therapy as indicated. Please contact the pharmacist at  or  for discharge recommendations if needed.      Estefania ManceraD  Clinical Pharmacist, Orthopedics and Med/Surg  [address] [facility] ()

## 2021-07-22 NOTE — PROGRESS NOTES
Occupational Therapy    Chart reviewed in prep for skilled OT treatment; however, pt declined participation secondary to fatigue. Will defer and follow up later as able and appropriate.     Thank you,  Roseanne Worley, OT

## 2021-07-22 NOTE — DISCHARGE INSTRUCTIONS
Discharge Instructions Knee Replacement  Dr. Ashley Alves      Patient Name  Jim Field  Date of procedure  7/20/2021    Procedure  Procedure(s):  RIGHT TOTAL KNEE ARTHROPLASTY  Surgeon  Surgeon(s) and Role:     Sofie Rivera MD - Primary  Date of discharge: 7/22/2021  PCP: Alexandro Brock MD    Follow up care   Follow up visit with Dr. Ashley Alves in 3 weeks. Call [phone]  to make an appointment   You do have staples in your knee incision. They will be taken out in 14 days by [facility] staff. Activity at home   Take a short walk every hour; except at night when sleeping   Do your Home Exercise Program 3 times every day    After exercising lie down and elevate your leg on pillows for 15-30 minutes to decrease swelling   Refer to your patient notebook for more information    Bathing and caring for your incision   You may take a shower with your waterproof dressing on your knee.  The waterproof dressing is to stay on your knee for 7 days.  On the 7th day have someone gently peel the dressing off by lifting the edge and stretching it to break the seal.   You may then leave your incision open to air unless you see drainage from your knee. Preventing blood clots   Take Coumadin every day as prescribed.  Call Dr. Ashley Alves if you have side effects of blood thinning medication: bleeding, bruising, upset stomach, or diarrhea.  Call Dr. Ashley Alves for signs of a blood clot in your leg: calf pain, tenderness, redness, swelling of lower leg    Preventing lung congestion   Use your incentive spirometer 4 times a day; do 10 repetitions each time   Remember to keep the small blue ball between the two arrows when taking a slow, deep breath     Pain Management   Get up and walk a short distance to relieve pain and stiffness.  Place ice wrap on your knee except when you are walking.  The gel ice packs should be changed about every 4 hours   Elevate your leg on pillows for 15-30 minutes    Take Tylenol 650mg (take two 325mg tablets) every 6 hours for pain.  If needed, take a narcotic pain pill every 4-6 hours as prescribed.  Take all medications with a small amount of food.  As your pain decreases, take the narcotics less often or take ½ of a pill   Call Dr. Radha Edge if you have side effects from your narcotic pain medication: itching, drowsiness, dizziness, upset stomach, dry mouth, constipation or if you medication is not relieving your pain. Diet after surgery   You may resume your normal diet. Include vegetables, fruit, whole grains, lean meats, and low-fat dairy products. Eat food high in fiber    Drink plenty of fluids, including 8 cups of water daily   Take Senokot-s twice a day to prevent constipation    Avoid after surgery   Do not take any over-the-counter medication for pain except Tylenol   Do not take more than 3000mg (3 grams) of Tylenol in 24 hours.  Do not drink alcoholic beverages   Do not smoke   Do not drive until seen for follow up appointment   Do not place frozen gel pack directly on your skin. It can cause frostbite.  Do not take a tub bath, swim or get in a hot tub for 6 weeks  Prevention of falls and safety at home   Set up an area where you can rest comfortably leaving space around furniture to allow you to walk with your walker   Keep stairs, hallways and bathrooms well lit; especially at night   Arrange for care for your pets   Keep your home free of clutter      Call Dr. Radha Edge at [phone] for:   Pain that is not relieved by pain medication, ice and activity   Side effects of medications   Increased/spread of bruising   Warning signs of infection:  ? persistent fever greater than 100 degrees  ?  shaking or chills  ? increased redness, tenderness, swelling or drainage from incision  ? increased pain during activity or rest   Warning signs of a blood clot in your leg:  ? increased pain in your calf  ? tenderness or redness  ? increased swelling or knee, calf, ankle or foot    Call [phone] after 5pm or on a weekend.  The on call physician will return your phone call   Call your Primary Care Doctor for:    Concerns about your medical conditions such as diabetes, high blood pressure, asthma, congestive heart failure   Blood sugars greater than 180   Persistent headache or dizziness   Coughing or congestion   Constipation or diarrhea   Burning when you go to the bathroom   Abnormal heart rate (fast or slow)     Call 911 and go to the nearest hospital for:    Sudden increased shortness of breath   Sudden onset of chest pain   Difficulty breathing   Localized chest pain with coughing or taking a deep breath

## 2021-07-22 NOTE — PROGRESS NOTES
FUNCTIONAL STATUS PRIOR TO ADMISSION: Patient was modified independent using a single point cane for functional mobility. HOME SUPPORT PRIOR TO ADMISSION: The patient lived with  but did not require assist.     Physical Therapy Goals  Initiated 7/20/2021     1. Patient will move from supine to sit and sit to supine  and scoot up and down in bed with modified independence within 4 days. 2. Patient will perform sit to stand with modified independence within 4 days. 3. Patient will ambulate with modified independence for > 150 feet with the least restrictive device within 4 days. 4. Patient will ascend/descend 4 stairs with one handrail(s) with modified independence within 4 days. 5. Patient will perform home exercise program per protocol with supervision/set-up within 4 days. 6. Patient will demonstrate AROM 0-90 degrees in operative joint within 4 days. PHYSICAL THERAPY TREATMENT  Patient: Agustín Ansari (86 y.o. female)  Date: 7/22/2021  Diagnosis: Primary osteoarthritis of right knee [M17.11]  Localized osteoarthritis of right knee [M17.11] <principal problem not specified>  Procedure(s) (LRB):  RIGHT TOTAL KNEE ARTHROPLASTY (Right) 2 Days Post-Op  Precautions: WBAT, Fall  Chart, physical therapy assessment, plan of care and goals were reviewed. ASSESSMENT  Patient continues with skilled PT services and is progressing towards goals. Reviewed and performed supine and seated TKR - daughter present and videoed exercise instruction. Patient also instructed in use of ice, elevation of right foot higher than heart and progressive walking program.   Gait quality improved with increased pace, longer stride length and more even weight shift. Patient able to increase amb distance and perform stairs with contact guard. Pt is cleared for discharge from PT standpoint.     Current Level of Function Impacting Discharge (mobility/balance): Supervision/cues for TKR exercise, supervision for amb, CGA for stairs    Other factors to consider for discharge: family to assist at discharge         PLAN :  Patient continues to benefit from skilled intervention to address the above impairments. Continue treatment per established plan of care. to address goals. Recommendation for discharge: (in order for the patient to meet his/her long term goals)  Physical therapy at least 2 days/week in the home     This discharge recommendation:  Has been made in collaboration with the attending provider and/or case management    IF patient discharges home will need the following DME: patient owns DME required for discharge       SUBJECTIVE:   Patient stated I don't have to stop - I can keep on walking.     OBJECTIVE DATA SUMMARY:   Critical Behavior:  Neurologic State: Appropriate for age  Orientation Level: Oriented X4  Cognition: Appropriate for age attention/concentration  Safety/Judgement: Awareness of environment, Home safety, Insight into deficits    Range of Motion:   Right knee seated 65 flexion                         Functional Mobility Training:  Bed Mobility:     Supine to Sit: Stand-by assistance (use of gait belt)  Sit to Supine: Contact guard assistance (use of leg )  Scooting: Stand-by assistance        Transfers:  Sit to Stand: Contact guard assistance  Stand to Sit: Contact guard assistance                             Balance:  Sitting: Intact; Without support  Standing: Impaired; With support  Standing - Static: Good;Constant support  Standing - Dynamic : Good;Constant support  Ambulation/Gait Training:  Distance (ft): 150 Feet (ft)  Assistive Device: Walker, rolling;Gait belt  Ambulation - Level of Assistance: Stand-by assistance        Gait Abnormalities: Decreased step clearance  Right Side Weight Bearing: As tolerated  Left Side Weight Bearing: Full  Base of Support: Center of gravity altered;Shift to left  Stance: Right decreased  Speed/Elisa: Slow  Step Length: Right shortened;Left shortened  Swing Pattern: Right asymmetrical        Stairs:  Number of Stairs Trained: 4  Stairs - Level of Assistance: Contact guard assistance (verbal cues for proper sequence)   Rail Use: Left  (cane right ascending)    Therapeutic Exercises:     EXERCISE   Sets   Reps   Active Active Assist   Passive Self ROM   Comments   Ankle Pumps  5 [x]                                        []                                        []                                        []                                           Quad Sets  5 [x]                                        []                                        []                                        []                                           Hamstring Sets  5 [x]                                        []                                        []                                        []                                           Short Arc Quads  5 [x]                                        []                                        []                                        []                                           Knee Extension Stretch     [x]                                          []                                          []                                          []                                        Place towel roll under knee   Heel Slides  5 [x]                                        []                                        []                                        []                                           Long Arc Quads  3 [x]                                        []                                        []                                        []                                           Knee Flexion Stretch  2 [x]                                        []                                        []                                        []                                           Straight Leg Raises   []                                        [] []                                        []                                               Pain Rating:  Pain right knee pre-treatment 5/10; after amb/ex and stairs 6/10    Activity Tolerance:   Good - POD# 2 - increased activity tolerance    After treatment patient left in no apparent distress:   Sitting in chair, Call bell within reach, and Caregiver / family present    COMMUNICATION/COLLABORATION:   The patients plan of care was discussed with: Registered nurse.      Emile Osorio, PT   Time Calculation: 45 mins

## 2021-07-22 NOTE — PROGRESS NOTES
Patient discharged to home. Patient alert and oriented x 4. Vital signs remain stable and patient is afebrile. Discharge instructions and prescriptions discussed with patient. Patient verbalizes understanding. Time allotted for questions. Patient and belongings transported to [facility] via wheel chair. PIV removed. Discharge instructions given to patient.

## 2021-07-22 NOTE — PROGRESS NOTES
Problem: Falls - Risk of  Goal: *Absence of Falls  Description: Document Nimo Bridges Fall Risk and appropriate interventions in the flowsheet.   Outcome: Resolved/Met     Problem: Patient Education: Go to Patient Education Activity  Goal: Patient/Family Education  Outcome: Resolved/Met     Problem: Patient Education: Go to Patient Education Activity  Goal: Patient/Family Education  Outcome: Resolved/Met     Problem: Breathing Pattern - Ineffective  Goal: *Use of effective breathing techniques  Outcome: Resolved/Met  Goal: *PALLIATIVE CARE:  Alleviation of Dyspnea  Outcome: Resolved/Met     Problem: Patient Education: Go to Patient Education Activity  Goal: Patient/Family Education  Outcome: Resolved/Met     Problem: Discharge Planning  Goal: *Discharge to safe environment  Outcome: Resolved/Met     Problem: Patient Education: Go to Patient Education Activity  Goal: Patient/Family Education  Outcome: Resolved/Met

## 2021-07-22 NOTE — PROGRESS NOTES
Assumed care of patient. SBAR report received from off going nurse. Patient alert and oriented x 4. Patient offers no concerns and is able to verbalized needs. Patient denies/ complains of pain. Patient in bed. Breathing appears even and unlabored. Assessment to follow. Bed in lowest locked position. White board updated. Call bell within reach. Will continue to monitor.

## 2021-07-22 NOTE — PROGRESS NOTES
PHI-E Discharge Timeout           Discharge instructions reviewed in detail with Mr.&. Yaneth Ji  They voiced understanding of all instructions  Verbal teach back confirmed understanding of DC instructions as reviewed  Discharge video has been viewed  Patient ready for discharge  She will follow up with Dr. Mitchell Coyle in 3-4 weeks

## 2021-07-22 NOTE — PROGRESS NOTES
Problem: Self Care Deficits Care Plan (Adult)  Goal: *Acute Goals and Plan of Care (Insert Text)  Description: FUNCTIONAL STATUS PRIOR TO ADMISSION: Patient was independent and active without use of DME. She worked at [employer]. HOME SUPPORT: The patient lived with  but did not require assist.    Occupational Therapy Goals  Initiated 7/21/2021    1. Patient will perform lower body dressing at supervision/set-up within 7 days. 2. Patient will perform toilet transfers at supervision/set-up using Walkers, Type: Rolling Walker within 7 days. 3. Patient will perform all aspects of toileting at supervision/set-up within 7 days. 4. Patient will tolerate greater than 5 minutes of standing without a rest break at supervision/set-up using Walkers, Type: Rolling Walker during ADL's within 7 days. Outcome: Progressing Towards Goal    OCCUPATIONAL THERAPY TREATMENT  Patient: Kory Golden (63 y.o. female)  Date: 7/22/2021  Diagnosis: Primary osteoarthritis of right knee [M17.11]  Localized osteoarthritis of right knee [M17.11] <principal problem not specified>  Procedure(s) (LRB):  RIGHT TOTAL KNEE ARTHROPLASTY (Right) 2 Days Post-Op  Precautions: WBAT, Fall  Chart, occupational therapy assessment, plan of care, and goals were reviewed. ASSESSMENT  Patient continues with skilled OT services and is progressing towards goals. Pt progressing with mobility/balance, activity tolerance and AE technique understanding, functionally evidenced by CGA for RW level toileting, as well as, donning/doffing pants with Mod A and Max verbal cues for AE technique. Pt's  reports he will individually acquire recommended \"hip kit,\" as well as, pt's  reporting they have seated surface available within home. Pt safe for discharge home from a self-care standpoint, with acute OT to continue to follow in the event of delay in discharge. Current Level of Function Impacting Discharge (ADLs):  Mod A    Other factors to consider for discharge: none         PLAN :  Patient continues to benefit from skilled intervention to address the above impairments. Continue treatment per established plan of care to address goals. Recommend with staff: OOB meals, Active ADL engagement, Assist x1 to/from bathroom at RW    Recommend next OT session: POC progression    Recommendation for discharge: (in order for the patient to meet his/her long term goals)  No skilled occupational therapy/ follow up rehabilitation needs identified at this time. This discharge recommendation:  Has been made in collaboration with the attending provider and/or case management    IF patient discharges home will need the following DME: patient owns DME required for discharge       SUBJECTIVE:   Patient stated [facility] for your help.     OBJECTIVE DATA SUMMARY:   Cognitive/Behavioral Status:  Neurologic State: Appropriate for age  Orientation Level: Oriented X4  Cognition: Appropriate for age attention/concentration  Perception: Appears intact  Perseveration: No perseveration noted  Safety/Judgement: Awareness of environment;Home safety; Insight into deficits    Functional Mobility and Transfers for ADLs:  Bed Mobility:  Supine to Sit: Stand-by assistance; Additional time (use of leg )  Sit to Supine: Contact guard assistance (use of leg )    Transfers:  Sit to Stand: Contact guard assistance  Functional Transfers  Bathroom Mobility: Contact guard assistance       Balance:  Sitting: Intact; Without support  Standing: Impaired; With support  Standing - Static: Good;Constant support  Standing - Dynamic : Good;Fair;Constant support    ADL Intervention:  Grooming  Grooming Assistance: Contact guard assistance  Position Performed: Standing  Washing Hands: Contact guard assistance  Adaptive Equipment:  (RW)    Upper Body Dressing Assistance  Dressing Assistance: Set-up  Bra: Set-up  Pullover Shirt: Set-up    Lower Body Dressing Assistance  Pants With Elastic Waist: Moderate assistance  Leg Crossed Method Used: No  Position Performed: Seated edge of bed  Cues: Verbal cues provided;Physical assistance (for AE technique)    Toileting  Toileting Assistance: Contact guard assistance  Bladder Hygiene: Independent  Clothing Management: Contact guard assistance  Adaptive Equipment: Walker    Cognitive Retraining  Safety/Judgement: Awareness of environment;Home safety; Insight into deficits    Patient instructed and indicated understanding the benefits of maintaining activity tolerance, functional mobility, and independence with self care tasks during acute stay  to ensure safe return home and to baseline. Encouraged patient to increase frequency and duration OOB, be out of bed for all meals, perform daily ADLs (as approved by RN/MD regarding bathing etc), and performing functional mobility to/from bathroom. Pt educated on safe transfer techniques, with specific emphasis on proper hand placement to push up from seated surface rather than attempt to pull self up, fully positioning self in-front of desired seated location, feeling chair on back of legs and reaching back with 1-2 UE to slowly lower self to seated position. Pain:  Pt with c/o R knee pain, did not quantify; nursing aware and following    Activity Tolerance:   Good, Fair, and requires rest breaks    After treatment patient left in no apparent distress:   Supine in bed, Call bell within reach, Caregiver / family present, and Side rails x 3    COMMUNICATION/COLLABORATION:   The patients plan of care was discussed with: Physical therapist, Registered nurse, and Case management.      Joan Larkin OT  Time Calculation: 38 mins

## 2021-07-22 NOTE — PROGRESS NOTES
ORTHO PROGRESS NOTE    2021  Admit Date:   2021        Subjective:    Artemio Smith is a 63 y.o. BLACK/ female who is 2 Days Post-Op Procedure(s):  RIGHT TOTAL KNEE ARTHROPLASTY. The patient states moderate pain, otherwise is without c/o at this time. Pain control is adequate. Making slow gains mobilizing with PT. The patient denies CP, SOB, N/V. Vital Signs:    Patient Vitals for the past 8 hrs:   BP Temp Pulse Resp SpO2   21 0213 125/69 98 °F (36.7 °C) 60 15 96 %     Temp (24hrs), Av °F (36.7 °C), Min:97.9 °F (36.6 °C), Max:98 °F (36.7 °C)      Pain Control:   Pain Assessment  Pain Scale 1: Numeric (0 - 10)  Pain Intensity 1: 2  Pain Onset 1: post op  Pain Location 1: Knee  Pain Orientation 1: Right  Pain Description 1: Aching, Sore  Pain Intervention(s) 1: Medication (see MAR)    LAB:    Recent Labs     21  0403 21  0416 21  0416   HGB  --   --  9.2*   INR 1.2*   < > 1.0   NA  --   --  141   K  --   --  3.4*   CL  --   --  108   CO2  --   --  25   BUN  --   --  24*   CREA  --   --  0.71   GLU  --   --  172*    < > = values in this interval not displayed. Assessment & Physician's Comment:  A&O x 3, NAD  Respirations unlabored  Abdomen S/NT  DF/EHL/PF intact  Distal pulses intact  Calves S/NT, SILT bilateral lower extremities  Dressing is clean, dry, and intact    Procedure:  Procedure(s):  RIGHT TOTAL KNEE ARTHROPLASTY    Plan:  1) Pain Control: Adequate, continue current regimen. 2) Hemodynamics: Stable. 3) Wound: Please place Aquacel dressing prior to discharge. 4) Activity: WBAT, mobilize with assist.  5) DVT Prophylaxis: Coumadin, SCD's, encourage ankle pump exercise, mobilize. 6) Disposition: Plan on home today if meets PT goals for safe discharge. Follow up in 3-4 weeks in Dr Hahn [facility] office for post op care.          Maritza Moses PA-C

## 2021-08-05 DIAGNOSIS — I10 ESSENTIAL HYPERTENSION: ICD-10-CM

## 2021-08-06 RX ORDER — VERAPAMIL HYDROCHLORIDE 240 MG/1
240 TABLET, FILM COATED, EXTENDED RELEASE ORAL DAILY
Qty: 90 TABLET | Refills: 1 | Status: SHIPPED | OUTPATIENT
Start: 2021-08-06 | End: 2022-01-28 | Stop reason: SDUPTHER

## 2021-09-15 LAB — MAMMOGRAPHY, EXTERNAL: NORMAL

## 2021-11-26 DIAGNOSIS — E78.00 HIGH CHOLESTEROL: ICD-10-CM

## 2021-11-28 RX ORDER — IRBESARTAN 300 MG/1
300 TABLET ORAL DAILY
Qty: 90 TABLET | Refills: 1 | Status: SHIPPED | OUTPATIENT
Start: 2021-11-28 | End: 2022-06-03

## 2021-12-02 ENCOUNTER — OFFICE VISIT (OUTPATIENT)
Dept: ORTHOPEDIC SURGERY | Age: 61
End: 2021-12-02
Payer: COMMERCIAL

## 2021-12-02 VITALS — WEIGHT: 260 LBS | BODY MASS INDEX: 44.39 KG/M2 | HEIGHT: 64 IN

## 2021-12-02 DIAGNOSIS — Z96.651 STATUS POST TOTAL RIGHT KNEE REPLACEMENT: Primary | ICD-10-CM

## 2021-12-02 DIAGNOSIS — M48.061 SPINAL STENOSIS OF LUMBAR REGION, UNSPECIFIED WHETHER NEUROGENIC CLAUDICATION PRESENT: ICD-10-CM

## 2021-12-02 DIAGNOSIS — Z98.890 STATUS POST SURGICAL MANIPULATION OF KNEE JOINT: ICD-10-CM

## 2021-12-02 DIAGNOSIS — M54.50 LUMBAR BACK PAIN: ICD-10-CM

## 2021-12-02 DIAGNOSIS — Z96.641 STATUS POST TOTAL REPLACEMENT OF RIGHT HIP: ICD-10-CM

## 2021-12-02 PROCEDURE — 99024 POSTOP FOLLOW-UP VISIT: CPT | Performed by: PHYSICIAN ASSISTANT

## 2021-12-02 RX ORDER — OXYCODONE HYDROCHLORIDE 5 MG/1
5 TABLET ORAL
Qty: 40 TABLET | Refills: 0 | Status: SHIPPED | OUTPATIENT
Start: 2021-12-02 | End: 2021-12-09

## 2021-12-02 RX ORDER — PREGABALIN 150 MG/1
150 CAPSULE ORAL 3 TIMES DAILY
Qty: 90 CAPSULE | Refills: 1 | Status: SHIPPED | OUTPATIENT
Start: 2021-12-02 | End: 2022-01-28 | Stop reason: SDUPTHER

## 2021-12-02 NOTE — PROGRESS NOTES
Marcela Eddy (: 1960) is a 63 y.o. female, established patient, here for evaluation of the following chief complaint(s):  No chief complaint on file. SUBJECTIVE/OBJECTIVE:  Marcela Eddy (: 1960) is a 63 y.o. female. Right Total Knee Arthroplasty - Right2021   Right knee manipulation under general anesthesia 10/9/2021    The patient is frustrated at her lack of progress despite manipulation and continued outpatient physical therapy. Patient also states that she has had a flareup of back pain since her knee surgery. No Known Allergies    Current Outpatient Medications   Medication Sig    irbesartan (AVAPRO) 300 mg tablet Take 1 Tablet by mouth daily.  verapamil ER (CALAN-SR) 240 mg CR tablet Take 1 Tablet by mouth daily. TAKE 1 TABLET BY MOUTH EVERY DAY EVERY NIGHT    senna-docusate (PERICOLACE) 8.6-50 mg per tablet Take 1 Tablet by mouth two (2) times a day. Indications: constipation, opioid induced constipation    warfarin (COUMADIN) 2 mg tablet Take 3 tabs daily or as directed by physician based on weekly PT/INR lab values  Indications: deep vein thrombosis prevention, post op DVT prevention    polyethylene glycol (MIRALAX) 17 gram packet Take 1 Packet by mouth daily. Indications: constipation    naloxone (NARCAN) 4 mg/actuation nasal spray Use 1 spray intranasally, then discard. Repeat with new spray every 2 min as needed for opioid overdose symptoms, alternating nostrils. Indications: decrease in rate & depth of breathing due to opioid drug, opioid overdose    inulin/cholecalciferol, D3, (FIBER GUMMIES WITH VITAMIN D3 PO) Take 2 Tablets by mouth daily.  OTHER two (2) times a day.  BONE HEALTH DAILY FUNDAMENTALS ( CONTAINS 7 TABS, 3 ARE CHEWABLE, 4 ARE WITH WATER) ,PATIENT TAKES ALL 7 , TWICE DAILY    psyllium husk/aspartame (DAILY FIBER, PSYLLIUM-ASPART, PO) Take  by mouth. 5 TSP WITH 8 OUNCES OF WATER DAILY    gabapentin (NEURONTIN) 300 mg capsule Take 1 Capsule by mouth three (3) times daily. Max Daily Amount: 900 mg.  hydroCHLOROthiazide (HYDRODIURIL) 50 mg tablet Take 1 Tablet by mouth daily.  famotidine (PEPCID) 20 mg tablet TAKE 1 TABLET BY MOUTH TWICE A DAY (Patient not taking: Reported on 7/20/2021)    acetaminophen (TYLENOL) 650 mg TbER Take 650 mg by mouth every eight (8) hours. Indications: Pain associated with Arthritis     No current facility-administered medications for this visit. Social History     Socioeconomic History    Marital status:      Spouse name: Not on file    Number of children: Not on file    Years of education: Not on file    Highest education level: Not on file   Occupational History    Not on file   Tobacco Use    Smoking status: Never Smoker    Smokeless tobacco: Never Used   Vaping Use    Vaping Use: Never used   Substance and Sexual Activity    Alcohol use: Never    Drug use: Never    Sexual activity: Yes   Other Topics Concern    Not on file   Social History Narrative    Not on file     Social Determinants of Health     Financial Resource Strain:     Difficulty of Paying Living Expenses: Not on file   Food Insecurity:     Worried About Running Out of Food in the Last Year: Not on file    Janell of Food in the Last Year: Not on file   Transportation Needs:     Lack of Transportation (Medical): Not on file    Lack of Transportation (Non-Medical):  Not on file   Physical Activity:     Days of Exercise per Week: Not on file    Minutes of Exercise per Session: Not on file   Stress:     Feeling of Stress : Not on file   Social Connections:     Frequency of Communication with Friends and Family: Not on file    Frequency of Social Gatherings with Friends and Family: Not on file    Attends Shinto Services: Not on file    Active Member of Clubs or Organizations: Not on file    Attends Club or Organization Meetings: Not on file    Marital Status: Not on file   Intimate Partner Violence:     Fear of Current or Ex-Partner: Not on file    Emotionally Abused: Not on file    Physically Abused: Not on file    Sexually Abused: Not on file   Housing Stability:     Unable to Pay for Housing in the Last Year: Not on file    Number of Places Lived in the Last Year: Not on file    Unstable Housing in the Last Year: Not on file       Past Surgical History:   Procedure Laterality Date    HX  SECTION N/A     x 3    HX COLONOSCOPY      HX KNEE REPLACEMENT Left 2019    HX TONSILLECTOMY      HX WISDOM TEETH EXTRACTION Bilateral        Family History   Problem Relation Age of Onset    Diabetes Mother     Arthritis-osteo Father     Other Son         MULTIPLE ABSCESS TO HIS BRAIN     Anesth Problems Neg Hx         OB History    No obstetric history on file. REVIEW OF SYSTEMS:  ROS    Patient denies any recent fever, chills, nausea, vomiting, chest pain, or shortness of breath. Vitals:  Ht 5' 4\" (1.626 m)   Wt 260 lb (117.9 kg)   BMI 44.63 kg/m²    Body mass index is 44.63 kg/m². PHYSICAL EXAM:  Physical Exam     Postoperative wound is well healed without erythema or drainage. ROM of the operative extremity demonstrates full extension with flexion to 95 degrees. Mild swelling of the operative extremity is noted. There is no calf tenderness. Distal motor and sensation is intact.       IMAGING:      Orders Placed This Encounter    REFERRAL TO PHYSICAL THERAPY     Referral Priority:   Routine     Referral Type:   PT/OT/ST     Referral Reason:   Specialty Services Required     Requested Specialty:   Physical Therapy     Number of Visits Requested:   1    REFERRAL TO PHYSICAL THERAPY     Referral Priority:   Routine     Referral Type:   PT/OT/ST     Requested Specialty:   Physical Therapy     Number of Visits Requested:   12            ASSESSMENT/PLAN:  Below is the assessment and plan developed based on review of pertinent history, physical exam, labs, studies, and medications. Discussed the patient's diagnosis and radiographic findings, and have answered all patient questions to her satisfaction. Have provided the patient with a prescription for outpatient PT for the right knee and for the back. Have asked the patient to follow up in 4 weeks time for reevaluation the first week of January prior to returning to work full-time. The patient was asked to contact the office with any questions or concerns. The patient understands and agrees to the treatment plan as outlined above. 1. Status post total right knee replacement  -     REFERRAL TO PHYSICAL THERAPY  2. Lumbar back pain  -     REFERRAL TO PHYSICAL THERAPY  3. Spinal stenosis of lumbar region, unspecified whether neurogenic claudication present  -     REFERRAL TO PHYSICAL THERAPY      Return in about 4 weeks (around 12/30/2021) for post op follow up. Dr. Eva Navarro was available for immediate consultation as needed. An electronic signature was used to authenticate this note.   -- Yakov Henning PA-C

## 2021-12-18 DIAGNOSIS — I10 ESSENTIAL HYPERTENSION: ICD-10-CM

## 2021-12-20 RX ORDER — HYDROCHLOROTHIAZIDE 50 MG/1
TABLET ORAL
Qty: 90 TABLET | Refills: 1 | Status: SHIPPED | OUTPATIENT
Start: 2021-12-20 | End: 2022-07-07

## 2022-01-13 ENCOUNTER — DOCUMENTATION ONLY (OUTPATIENT)
Dept: ORTHOPEDIC SURGERY | Age: 62
End: 2022-01-13

## 2022-01-13 NOTE — PROGRESS NOTES
Work status [facility] [phone] at patient request

## 2022-01-25 ENCOUNTER — OFFICE VISIT (OUTPATIENT)
Dept: ORTHOPEDIC SURGERY | Age: 62
End: 2022-01-25
Payer: COMMERCIAL

## 2022-01-25 VITALS — WEIGHT: 270 LBS | HEIGHT: 64 IN | BODY MASS INDEX: 46.1 KG/M2

## 2022-01-25 DIAGNOSIS — Z96.651 STATUS POST TOTAL RIGHT KNEE REPLACEMENT: Primary | ICD-10-CM

## 2022-01-25 DIAGNOSIS — M48.062 SPINAL STENOSIS OF LUMBAR REGION WITH NEUROGENIC CLAUDICATION: ICD-10-CM

## 2022-01-25 DIAGNOSIS — M54.16 LUMBAR RADICULOPATHY: ICD-10-CM

## 2022-01-25 DIAGNOSIS — M54.50 LUMBAR BACK PAIN: ICD-10-CM

## 2022-01-25 DIAGNOSIS — M43.16 SPONDYLOLISTHESIS OF LUMBAR REGION: ICD-10-CM

## 2022-01-25 DIAGNOSIS — M25.551 HIP PAIN, ACUTE, RIGHT: ICD-10-CM

## 2022-01-25 PROCEDURE — 99215 OFFICE O/P EST HI 40 MIN: CPT | Performed by: PHYSICIAN ASSISTANT

## 2022-01-25 RX ORDER — PRUCALOPRIDE 2 MG/1
1 TABLET, FILM COATED ORAL DAILY
COMMUNITY
Start: 2021-09-20 | End: 2022-01-28 | Stop reason: SDUPTHER

## 2022-01-25 RX ORDER — HYDROCODONE BITARTRATE AND ACETAMINOPHEN 5; 325 MG/1; MG/1
1 TABLET ORAL
Qty: 30 TABLET | Refills: 0 | Status: SHIPPED | OUTPATIENT
Start: 2022-01-25 | End: 2022-02-01

## 2022-01-25 RX ORDER — ESTRADIOL 0.1 MG/G
CREAM VAGINAL
COMMUNITY
Start: 2021-09-16

## 2022-01-25 RX ORDER — HYDROCODONE BITARTRATE AND ACETAMINOPHEN 5; 325 MG/1; MG/1
1 TABLET ORAL
Qty: 30 TABLET | Refills: 0 | Status: SHIPPED | OUTPATIENT
Start: 2022-01-25 | End: 2022-01-25

## 2022-01-25 RX ORDER — OXYBUTYNIN CHLORIDE 5 MG/1
1 TABLET, EXTENDED RELEASE ORAL
COMMUNITY
Start: 2021-09-16 | End: 2022-07-28 | Stop reason: SDUPTHER

## 2022-01-25 NOTE — PROGRESS NOTES
Concepcion Victoria (: 1960) is a 63 y.o. female, established patient, here for evaluation of the following chief complaint(s):  No chief complaint on file. SUBJECTIVE/OBJECTIVE:  Concepcion Victoria (: 1960) is a 63 y.o. female. Right Total Knee Arthroplasty - Right 2021     Patient had subsequent manipulation under general anesthesia of the right knee and 2021. Patient states she had a flareup of sciatica and back pain following the joint replacement surgery. Patient describes pain radiating from the lower lumbar region into the right lateral hip, lateral thigh and lower leg. Patient describes lower leg numbness and tingling as well as weakness in the right leg and giving way of the knee. The patient has been taking gabapentin 300 mg 5-6 times daily, diclofenac and Tylenol without lasting benefit. Patient states she has difficulty walking, standing or sitting due to severe pain. Patient rates her current pain level as an 8-9/10. No Known Allergies    Current Outpatient Medications   Medication Sig    oxybutynin chloride XL (DITROPAN XL) 5 mg CR tablet Take 1 Tablet by mouth.  prucalopride (Motegrity) 2 mg tab Take 1 Tablet by mouth daily.  estradioL (ESTRACE) 0.01 % (0.1 mg/gram) vaginal cream Insert  into vagina.  hydroCHLOROthiazide (HYDRODIURIL) 50 mg tablet TAKE 1 TABLET BY MOUTH EVERY DAY    pregabalin (Lyrica) 150 mg capsule Take 1 Capsule by mouth three (3) times daily. Max Daily Amount: 450 mg. Indications: neuropathic pain    irbesartan (AVAPRO) 300 mg tablet Take 1 Tablet by mouth daily.  verapamil ER (CALAN-SR) 240 mg CR tablet Take 1 Tablet by mouth daily. TAKE 1 TABLET BY MOUTH EVERY DAY EVERY NIGHT    senna-docusate (PERICOLACE) 8.6-50 mg per tablet Take 1 Tablet by mouth two (2) times a day.  Indications: constipation, opioid induced constipation    warfarin (COUMADIN) 2 mg tablet Take 3 tabs daily or as directed by physician based on weekly PT/INR lab values  Indications: deep vein thrombosis prevention, post op DVT prevention    polyethylene glycol (MIRALAX) 17 gram packet Take 1 Packet by mouth daily. Indications: constipation    naloxone (NARCAN) 4 mg/actuation nasal spray Use 1 spray intranasally, then discard. Repeat with new spray every 2 min as needed for opioid overdose symptoms, alternating nostrils. Indications: decrease in rate & depth of breathing due to opioid drug, opioid overdose    inulin/cholecalciferol, D3, (FIBER GUMMIES WITH VITAMIN D3 PO) Take 2 Tablets by mouth daily.  OTHER two (2) times a day. BONE HEALTH DAILY FUNDAMENTALS ( CONTAINS 7 TABS, 3 ARE CHEWABLE, 4 ARE WITH WATER) ,PATIENT TAKES ALL 7 , TWICE DAILY    psyllium husk/aspartame (DAILY FIBER, PSYLLIUM-ASPART, PO) Take  by mouth. 5 TSP WITH 8 OUNCES OF WATER DAILY    famotidine (PEPCID) 20 mg tablet TAKE 1 TABLET BY MOUTH TWICE A DAY (Patient not taking: Reported on 7/20/2021)    acetaminophen (TYLENOL) 650 mg TbER Take 650 mg by mouth every eight (8) hours. Indications: Pain associated with Arthritis     No current facility-administered medications for this visit.        Social History     Socioeconomic History    Marital status:      Spouse name: Not on file    Number of children: Not on file    Years of education: Not on file    Highest education level: Not on file   Occupational History    Not on file   Tobacco Use    Smoking status: Never Smoker    Smokeless tobacco: Never Used   Vaping Use    Vaping Use: Never used   Substance and Sexual Activity    Alcohol use: Never    Drug use: Never    Sexual activity: Yes   Other Topics Concern    Not on file   Social History Narrative    Not on file     Social Determinants of Health     Financial Resource Strain:     Difficulty of Paying Living Expenses: Not on file   Food Insecurity:     Worried About Running Out of Food in the Last Year: Not on file    Janell of Food in the Last Year: Not on file   Transportation Needs:     Lack of Transportation (Medical): Not on file    Lack of Transportation (Non-Medical): Not on file   Physical Activity:     Days of Exercise per Week: Not on file    Minutes of Exercise per Session: Not on file   Stress:     Feeling of Stress : Not on file   Social Connections:     Frequency of Communication with Friends and Family: Not on file    Frequency of Social Gatherings with Friends and Family: Not on file    Attends Restoration Services: Not on file    Active Member of [address] [place] [address] Subtextual or Organizations: Not on file    Attends Club or Organization Meetings: Not on file    Marital Status: Not on file   Intimate Partner Violence:     Fear of Current or Ex-Partner: Not on file    Emotionally Abused: Not on file    Physically Abused: Not on file    Sexually Abused: Not on file   Housing Stability:     Unable to Pay for Housing in the Last Year: Not on file    Number of [place] in the Last Year: Not on file    Unstable Housing in the Last Year: Not on file       Past Surgical History:   Procedure Laterality Date    HX  SECTION N/A     x 3    HX COLONOSCOPY      HX KNEE REPLACEMENT Left 2019    HX TONSILLECTOMY      HX WISDOM TEETH EXTRACTION Bilateral        Family History   Problem Relation Age of Onset    Diabetes Mother     OSTEOARTHRITIS Father     Other Son         MULTIPLE ABSCESS TO HIS BRAIN     Anesth Problems Neg Hx         OB History    No obstetric history on file. REVIEW OF SYSTEMS:  ROS    Patient denies any recent fever, chills, nausea, vomiting, chest pain, or shortness of breath. Vitals: There were no vitals taken for this visit. There is no height or weight on file to calculate BMI. PHYSICAL EXAM:  Physical Exam       The patient is alert and oriented x3 and in no acute distress. Patient ambulates with moderate right antalgia with a stooped gait for flexed 10 to 20 degrees per.  Sensory testing in all major nerve distributions in the lower extremities is intact in the left lower extremity and is considered diminished in the right lateral calf and lateral thigh. . Manual motor testing of the major muscle groups of the lower extremities including dorsiflexion/EHL/plantarflexion, knee flexion/extension, hip flexion/extension/abduction/adduction is intact and symmetric in the bilateral lower extremities with the exception of right hip flexion which is weak 3/5 and right knee flexion/extension 3/5, and right dorsiflexion/EHL function which is weak 3/5. Deep tendon reflexes flat and symmetric in the bilateral knees and ankles. Negative straight leg raise on the left and is considered positive on the right. No evidence of clonus bilaterally. There is pain with range of motion of the right hip, pain-free range of motion of the left hip. Babinski's downgoing and symmetric bilaterally. Distal pulses 2+ and symmetric bilaterally. There is tenderness to palpation of the right lower lumbar and sacral regions. Postoperative wound is well healed without erythema or drainage. ROM of the operative extremity demonstrates full extension with flexion to 90 degrees. Mild swelling of the operative extremity is noted. There is no calf tenderness. Distal motor and sensation is intact. IMAGING:      XR Results (maximum last 3): Results from Appointment encounter on 01/25/22    XR SPINE LUMB MIN 4 V    Narrative  AP, lateral, flexion and extension digital view radiographs of the lumbar spine obtained in the office today and was reviewed demonstrate grade 1 anterolisthesis L4 on L5 7-8 mm which is mobile. Moderate disc height loss is noted at L5-S1 where there is evidence of spondylosis. There is no evidence of fracture, lytic lesion or acute bony abnormality.       XR KNEE RT 3 V    Narrative  AP standing, lateral and sunrise digital view radiographs of the right knee obtained in the office today and reviewed with the patient demonstrate anatomic alignment of components with no evidence of hardware loosening or subsidence. XR HIP RT W OR WO PELV 2-3 VWS    Narrative  AP pelvis and frog lateral digital radiograph of the right hip obtained in the office today was reviewed and demonstrate moderate femoral acetabular joint space loss with marginal osteophytes. Orders Placed This Encounter    XR HIP RT W OR WO PELV 2-3 VWS     Standing Status:   Future     Number of Occurrences:   1     Standing Expiration Date:   1/26/2023    XR KNEE RT 3 V     Standing Status:   Future     Number of Occurrences:   1     Standing Expiration Date:   1/26/2023    XR SPINE LUMB MIN 4 V     Standing Status:   Future     Number of Occurrences:   1     Standing Expiration Date:   1/26/2023    REFERRAL TO PHYSICAL THERAPY     Referral Priority:   Routine     Referral Type:   PT/OT/ST     Referral Reason:   Specialty Services Required     Requested Specialty:   Physical Therapy     Number of Visits Requested:   1          ASSESSMENT/PLAN:      1. Status post total right knee replacement  -     XR KNEE RT 3 V; Future  -     REFERRAL TO PHYSICAL THERAPY  2. Lumbar back pain  -     XR SPINE LUMB MIN 4 V; Future  -     REFERRAL TO PHYSICAL THERAPY  3. Hip pain, acute, right  -     XR HIP RT W OR WO PELV 2-3 VWS; Future  -     REFERRAL TO PHYSICAL THERAPY  4. Lumbar radiculopathy  -     REFERRAL TO PHYSICAL THERAPY  5. Spondylolisthesis of lumbar region  -     REFERRAL TO PHYSICAL THERAPY        Below is the assessment and plan developed based on review of pertinent history, physical exam, labs, studies, and medications. Have discussed the diagnosis and radiographic findings at length and answered all patient questions to her satisfaction. The patient has 2 simultaneous issues at this point. Patient has poor range of motion of the right knee as well as right lower extremity weakness and loss of sensation related to lumbar spondylolisthesis and stenosis.  Have provided the patient with a prescription for outpatient physical therapy for knee and core strengthening, modalities and instruction in a home exercise program. Have asked the patient to initiate walking and weight loss program as tolerated. Will plan on seeing the patient back for reevaluation in 4 to 6 weeks time. The patient should remain out of work until follow-up appointment in 4 to 6 weeks time. With failure to find significant improvement in both pain and functioning patient is likely going to need MRI of the lumbar spine. The patient understands and agrees to the treatment plan as outlined above. Return in about 6 weeks (around 3/8/2022) for Assess progress with treatment plan. Dr. Roshni Boswell was available for immediate consultation as needed. An electronic signature was used to authenticate this note.   -- Ruthie Long PA-C

## 2022-01-27 ENCOUNTER — CLINICAL SUPPORT (OUTPATIENT)
Dept: INTERNAL MEDICINE CLINIC | Age: 62
End: 2022-01-27
Payer: COMMERCIAL

## 2022-01-27 ENCOUNTER — TELEPHONE (OUTPATIENT)
Dept: INTERNAL MEDICINE CLINIC | Age: 62
End: 2022-01-27

## 2022-01-27 VITALS
WEIGHT: 271 LBS | SYSTOLIC BLOOD PRESSURE: 121 MMHG | TEMPERATURE: 98.1 F | RESPIRATION RATE: 16 BRPM | HEART RATE: 73 BPM | BODY MASS INDEX: 46.52 KG/M2 | DIASTOLIC BLOOD PRESSURE: 65 MMHG

## 2022-01-27 DIAGNOSIS — R30.0 DYSURIA: Primary | ICD-10-CM

## 2022-01-27 DIAGNOSIS — R35.0 URINE FREQUENCY: ICD-10-CM

## 2022-01-27 LAB
BILIRUB UR QL STRIP: NEGATIVE
GLUCOSE UR-MCNC: NEGATIVE MG/DL
KETONES P FAST UR STRIP-MCNC: NEGATIVE MG/DL
PH UR STRIP: 6 [PH] (ref 4.6–8)
PROT UR QL STRIP: NEGATIVE
SP GR UR STRIP: 1 (ref 1–1.03)
UA UROBILINOGEN AMB POC: NORMAL (ref 0.2–1)
URINALYSIS CLARITY POC: CLEAR
URINALYSIS COLOR POC: COLORLESS
URINE BLOOD POC: NEGATIVE
URINE LEUKOCYTES POC: NEGATIVE
URINE NITRITES POC: NEGATIVE

## 2022-01-27 PROCEDURE — 99211 OFF/OP EST MAY X REQ PHY/QHP: CPT | Performed by: INTERNAL MEDICINE

## 2022-01-27 PROCEDURE — 81001 URINALYSIS AUTO W/SCOPE: CPT | Performed by: INTERNAL MEDICINE

## 2022-01-27 NOTE — TELEPHONE ENCOUNTER
Two pt identifiers confirmed. I spoke to the patient and let her know per , she does not have a UTI. I encouraged her to drink plenty of water and keep her follow up appt. Pt verbalized understanding of information discussed w/ no further questions at this time.   Signed By: Tara Raymond LPN     January 27, 0604

## 2022-01-27 NOTE — PROGRESS NOTES
Identified pt with two pt identifiers(name and ). Reviewed record in preparation for visit and have obtained necessary documentation.   Chief Complaint   Patient presents with    Urinary Frequency    Bladder Infection    Blood Pressure Check        Vitals:    22 1328   BP: 121/65   Pulse: 73   Resp: 16   Temp: 98.1 °F (36.7 °C)   TempSrc: Temporal   Weight: 271 lb (122.9 kg)   PainSc:   1         Patriica Martin, 67 [facility]  [address] [place] [address]  W: [phone]  F: [phone]

## 2022-01-28 ENCOUNTER — VIRTUAL VISIT (OUTPATIENT)
Dept: INTERNAL MEDICINE CLINIC | Age: 62
End: 2022-01-28
Payer: COMMERCIAL

## 2022-01-28 DIAGNOSIS — M54.50 LUMBAR BACK PAIN: ICD-10-CM

## 2022-01-28 DIAGNOSIS — R73.03 PRE-DIABETES: ICD-10-CM

## 2022-01-28 DIAGNOSIS — M48.061 SPINAL STENOSIS OF LUMBAR REGION, UNSPECIFIED WHETHER NEUROGENIC CLAUDICATION PRESENT: ICD-10-CM

## 2022-01-28 DIAGNOSIS — K59.09 CHRONIC CONSTIPATION: ICD-10-CM

## 2022-01-28 DIAGNOSIS — R35.0 URINE FREQUENCY: ICD-10-CM

## 2022-01-28 DIAGNOSIS — I10 ESSENTIAL HYPERTENSION: Primary | ICD-10-CM

## 2022-01-28 DIAGNOSIS — R63.5 WEIGHT GAIN: ICD-10-CM

## 2022-01-28 PROCEDURE — 99214 OFFICE O/P EST MOD 30 MIN: CPT | Performed by: INTERNAL MEDICINE

## 2022-01-28 RX ORDER — VERAPAMIL HYDROCHLORIDE 240 MG/1
240 TABLET, FILM COATED, EXTENDED RELEASE ORAL DAILY
Qty: 90 TABLET | Refills: 1 | Status: SHIPPED | OUTPATIENT
Start: 2022-03-01 | End: 2022-08-04 | Stop reason: ALTCHOICE

## 2022-01-28 RX ORDER — PREGABALIN 150 MG/1
150 CAPSULE ORAL 3 TIMES DAILY
Qty: 90 CAPSULE | Refills: 3 | Status: SHIPPED | OUTPATIENT
Start: 2022-01-28 | End: 2022-04-28 | Stop reason: ALTCHOICE

## 2022-01-28 RX ORDER — PRUCALOPRIDE 2 MG/1
1 TABLET, FILM COATED ORAL DAILY
Qty: 90 EACH | Refills: 1 | Status: SHIPPED | OUTPATIENT
Start: 2022-01-28 | End: 2022-07-28 | Stop reason: ALTCHOICE

## 2022-01-28 NOTE — PROGRESS NOTES
Alison Weaver (: 1960) is a 63 y.o. female, established patient, here for evaluation of the following chief complaint(s):   Follow-up       ASSESSMENT/PLAN:  Below is the assessment and plan developed based on review of pertinent history, labs, studies, and medications. 1. Lumbar back pain  The following orders have not been finalized:  -     pregabalin (Lyrica) 150 mg capsule  2. Spinal stenosis of lumbar region, unspecified whether neurogenic claudication present  The following orders have not been finalized:  -     pregabalin (Lyrica) 150 mg capsule          Karina E Joanne Push, was evaluated through a synchronous (real-time) audio-video encounter. The patient (or guardian if applicable) is aware that this is a billable service, which includes applicable co-pays. Verbal consent to proceed has been obtained. The visit was conducted pursuant to the emergency declaration under the [address] [place] [address], [address] [place] [address] authority and the [employer] and [employer] Act. Patient identification was verified, and a caregiver was present when appropriate. The patient was located at home in a state where the provider was licensed to provide care. An electronic signature was used to authenticate this note.   -- Denita Hernandez LPN

## 2022-01-28 NOTE — PROGRESS NOTES
CC: Follow-up      HPI:    She is a 63 y.o. female with a hx of HTN, chronic back pain  who presents for evaluation of urinary symptoms    Patient had right knee full replacement and dealing with that now   Patient is struggling with neuropathy and started on lyrica recently and it was helping. Ran out     Patient is having increased back pain and was wondering if related to kidneys. Yesterday patient came to the office and had normal urine yesterday. Pre diabetes: states since January stopped eating sugar. \" Following a low carb diet\"   Currently weight is 271    This is an established visit conducted via telemedicine with video. The patient has been instructed that this meets HIPAA criteria and acknowledges and agrees to this method of visitation. Pursuant to the emergency declaration under the [facility], [facility] waiver authority and the [employer] Act, this Virtual Visit was conducted, with patient's consent, to reduce the patient's risk of exposure to COVID-19 and provide continuity of care for an established patient. Services were provided through a video synchronous discussion virtually to substitute for in-person clinic visit. ROS:  Constitutional: negative for fevers, chills, anorexia and weight loss  Eyes:   negative for visual disturbance,  irritation  ENT:   negative for tinnitus,sore throat,nasal congestion,ear pain, sinus pain.    Respiratory:  negative for cough, hemoptysis, dyspnea,wheezing  CV:   negative for chest pain, palpitations, lower extremity edema  GI:   negative for nausea, vomiting, diarrhea, abdominal pain,melena  Genitourinary: negative for frequency, dysuria, hematuria  Musculoskel: negative for myalgias, arthralgias, back pain, muscle weakness, joint pain  Neurological:  negative for headaches, dizziness, focal weakness, numbness  Psych:             Negative for depression and anxiety    Past Medical History:   Diagnosis Date    Chronic constipation     Hypertension     Obesity, Class II, BMI 35-39.9     Pre-diabetes 03/28/2019    A1C- 6.1    PVC (premature ventricular contraction)     States never been to cardio- has had for many years       Current Outpatient Medications on File Prior to Visit   Medication Sig Dispense Refill    oxybutynin chloride XL (DITROPAN XL) 5 mg CR tablet Take 1 Tablet by mouth.  estradioL (ESTRACE) 0.01 % (0.1 mg/gram) vaginal cream Insert  into vagina.  HYDROcodone-acetaminophen (NORCO) 5-325 mg per tablet Take 1 Tablet by mouth every six (6) hours as needed for Pain for up to 7 days. Max Daily Amount: 4 Tablets. Indications: pain 30 Tablet 0    hydroCHLOROthiazide (HYDRODIURIL) 50 mg tablet TAKE 1 TABLET BY MOUTH EVERY DAY 90 Tablet 1    irbesartan (AVAPRO) 300 mg tablet Take 1 Tablet by mouth daily. 90 Tablet 1    senna-docusate (PERICOLACE) 8.6-50 mg per tablet Take 1 Tablet by mouth two (2) times a day. Indications: constipation, opioid induced constipation 50 Tablet 0    polyethylene glycol (MIRALAX) 17 gram packet Take 1 Packet by mouth daily. Indications: constipation 21 Packet 0    naloxone (NARCAN) 4 mg/actuation nasal spray Use 1 spray intranasally, then discard. Repeat with new spray every 2 min as needed for opioid overdose symptoms, alternating nostrils. Indications: decrease in rate & depth of breathing due to opioid drug, opioid overdose 1 Each 0    psyllium husk/aspartame (DAILY FIBER, PSYLLIUM-ASPART, PO) Take  by mouth. 5 TSP WITH 8 OUNCES OF WATER DAILY      acetaminophen (TYLENOL) 650 mg TbER Take 650 mg by mouth every eight (8) hours.  Indications: Pain associated with Arthritis      warfarin (COUMADIN) 2 mg tablet Take 3 tabs daily or as directed by physician based on weekly PT/INR lab values  Indications: deep vein thrombosis prevention, post op DVT prevention (Patient not taking: Reported on 1/28/2022) 90 Tablet 0    inulin/cholecalciferol, D3, (FIBER GUMMIES WITH VITAMIN D3 PO) Take 2 Tablets by mouth daily. (Patient not taking: Reported on 2022)      OTHER two (2) times a day. BONE HEALTH DAILY FUNDAMENTALS ( CONTAINS 7 TABS, 3 ARE CHEWABLE, 4 ARE WITH WATER) ,PATIENT TAKES ALL 7 , TWICE DAILY (Patient not taking: Reported on 2022)      famotidine (PEPCID) 20 mg tablet TAKE 1 TABLET BY MOUTH TWICE A DAY (Patient not taking: Reported on 2021) 60 Tab 2     No current facility-administered medications on file prior to visit. Past Surgical History:   Procedure Laterality Date    HX  SECTION N/A     x 3    HX COLONOSCOPY      HX KNEE REPLACEMENT Left 2019    HX TONSILLECTOMY      HX WISDOM TEETH EXTRACTION Bilateral        Family History   Problem Relation Age of Onset    Diabetes Mother     OSTEOARTHRITIS Father     Other Son         MULTIPLE ABSCESS TO HIS BRAIN     Anesth Problems Neg Hx      Reviewed and no changes     Social History     Socioeconomic History    Marital status:      Spouse name: Not on file    Number of children: Not on file    Years of education: Not on file    Highest education level: Not on file   Occupational History    Not on file   Tobacco Use    Smoking status: Never Smoker    Smokeless tobacco: Never Used   Vaping Use    Vaping Use: Never used   Substance and Sexual Activity    Alcohol use: Never    Drug use: Never    Sexual activity: Yes   Other Topics Concern    Not on file   Social History Narrative    Not on file     Social Determinants of Health     Financial Resource Strain:     Difficulty of Paying Living Expenses: Not on file   Food Insecurity:     Worried About Running Out of Food in the Last Year: Not on file    Janell of Food in the Last Year: Not on file   Transportation Needs:     Lack of Transportation (Medical): Not on file    Lack of Transportation (Non-Medical):  Not on file   Physical Activity:     Days of Exercise per Week: Not on file    Minutes of Exercise per Session: Not on file   Stress:     Feeling of Stress : Not on file   Social Connections:     Frequency of Communication with Friends and Family: Not on file    Frequency of Social Gatherings with Friends and Family: Not on file    Attends [employer]ist Services: Not on file    Active Member of [address] [place] [address] or Organizations: Not on file    Attends Club or Organization Meetings: Not on file    Marital Status: Not on file   Intimate Partner Violence:     Fear of Current or Ex-Partner: Not on file    Emotionally Abused: Not on file    Physically Abused: Not on file    Sexually Abused: Not on file   Housing Stability:     Unable to Pay for Housing in the Last Year: Not on file    Number of [place] in the Last Year: Not on file    Unstable Housing in the Last Year: Not on file          There were no vitals taken for this visit. Physical Examination:   Gen: well appearing female  HEENT: normal conjunctiva, no audible congestion, patient does not see oral erythema, has MMM  Neck: patient does not feel enlarged or tender LAD or masses  Resp: normal respiratory effort, no audible wheezing. CV: patient does not feel palpitations or heart irregularity  Abd: patient does not feel abdominal tenderness or mass, patient does not notice distension  Extrem: patient does not see swelling in ankles or joints.    Neuro: Alert and oriented, able to answer questions without difficulty, able to move all extremities and walk normally          Lab Results   Component Value Date/Time    WBC 5.0 07/12/2021 12:18 PM    HGB 9.2 (L) 07/21/2021 04:16 AM    HCT 35.8 07/12/2021 12:18 PM    PLATELET 129 29/61/9776 12:18 PM    MCV 87.7 07/12/2021 12:18 PM     Lab Results   Component Value Date/Time    Sodium 141 07/21/2021 04:16 AM    Potassium 3.4 (L) 07/21/2021 04:16 AM    Chloride 108 07/21/2021 04:16 AM    CO2 25 07/21/2021 04:16 AM    Anion gap 8 07/21/2021 04:16 AM    Glucose 172 (H) 07/21/2021 04:16 AM    BUN 24 (H) 07/21/2021 04:16 AM    Creatinine 0.71 07/21/2021 04:16 AM    BUN/Creatinine ratio 34 (H) 07/21/2021 04:16 AM    GFR est AA >60 07/21/2021 04:16 AM    GFR est non-AA >60 07/21/2021 04:16 AM    Calcium 8.5 07/21/2021 04:16 AM     Lab Results   Component Value Date/Time    Cholesterol, total 186 03/29/2021 12:07 PM    HDL Cholesterol 55 03/29/2021 12:07 PM    LDL, calculated 111 (H) 03/29/2021 12:07 PM    VLDL, calculated 20 03/29/2021 12:07 PM    Triglyceride 111 03/29/2021 12:07 PM     Lab Results   Component Value Date/Time    TSH 2.780 03/29/2021 12:07 PM     No results found for: PSA, [place], [id number], [id number]  Lab Results   Component Value Date/Time    Hemoglobin A1c 6.2 (H) 07/12/2021 12:18 PM     No results found for: VITD3, XQVID2, XQVID3, XQVID, VD3RIA    Lab Results   Component Value Date/Time    ALT (SGPT) 18 03/29/2021 12:07 PM    Alk. phosphatase 65 03/29/2021 12:07 PM    Bilirubin, total 0.2 03/29/2021 12:07 PM           Assessment/Plan:    1. Lumbar back pain  2. Spinal stenosis of lumbar region, unspecified whether neurogenic claudication present  Increased pain in back patient ran out of lyrica. Discussed this is a chronic condition   - pregabalin (Lyrica) 150 mg capsule; Take 1 Capsule by mouth three (3) times daily. Max Daily Amount: 450 mg. Indications: neuropathic pain  Dispense: 90 Capsule; Refill: 3      3. Essential hypertension  Reports controlled on verapamil and avapro [id number]   - METABOLIC PANEL, COMPREHENSIVE; Future  - CBC WITH AUTOMATED DIFF; Future  - verapamil ER (CALAN-SR) 240 mg CR tablet; Take 1 Tablet by mouth daily. TAKE 1 TABLET BY MOUTH EVERY DAY EVERY NIGHT  Dispense: 90 Tablet; Refill: 1    4. Urine frequency  Normal urine , possibly due to higher blood sugar    5. Chronic constipation  - prucalopride (Motegrity) 2 mg tab; Take 1 Tablet by mouth daily. Dispense: 90 Each; Refill: 1    6.  Weight gain  - TSH 3RD GENERATION; Future    7. Pre-diabetes  Counseled on low sugar diet  - HEMOGLOBIN A1C WITH EAG; Future  - LIPID PANEL; Future      Ordered shingles and Tdap shot  Gyn care at [facility] reports up todate on mammogram and pap smear/ need records  Follow-up and Dispositions    · Return in about 6 months (around 7/28/2022). Declines covid vaccine, discussed she is high risk of serious covid disease  Rajat Reyna MD    This is an established visit conducted via real time video and audio telemedicine. The patient has been instructed that this meets HIPAA criteria and acknowledges and agrees to this method of visitation.

## 2022-02-02 ENCOUNTER — TELEPHONE (OUTPATIENT)
Dept: INTERNAL MEDICINE CLINIC | Age: 62
End: 2022-02-02

## 2022-02-04 NOTE — PROGRESS NOTES
IMPRESSION:   No specific mammographic evidence of malignancy. OVERALL ACR BI-RADS ASSESSMENT: 1 - Negative     RECOMMENDATION:   Screening Mammogram in 1 year.

## 2022-02-04 NOTE — TELEPHONE ENCOUNTER
I called [facility], I spoke to [facility], Pharmacist. I asked her what the patient's insurance will cover d/t they will not cover Pregabalin. [facility] stated her insurance did not say. I was on hold for over 20 mins with the patient's insurance company.  Please advise, thank you,   Signed By: Gael Luna LPN     February 2, 7626
PA for Pregabalin started. \"Message from Plan  CaseId:[id number];Status:Cancelled; Explanation:Case cannot be completed through ePA. This case could not be completed though ePA as the provider name and NPI number submitted do not match Forms available at: [email or web address] Call [phone] if you have any questions\"    Signed By: Gricel Jo LPN     February 2, 8062
Two pt identifiers confirmed. I let the patient know, Lyrica was not approved by her insurance; however, she could pay out of pocket by using good rx. at [address] [facility], the cost $12.50. The patient was happy, she said I could transfer the Rx to [address] [facility] was very expensive, over $120. I spoke to Isreal Mai, Pharmacist at [facility] [address] [facility] and gave him new V.O. for Lyrica per . Then I spoke to Katina Page, Pharmacist at [facility] and cancelled Lyrica order. The patient was made aware. Pt verbalized understanding of information discussed w/ no further questions at this time.     Signed By: Carlos Tee LPN     February 4, 4900
64

## 2022-02-14 ENCOUNTER — PATIENT MESSAGE (OUTPATIENT)
Dept: INTERNAL MEDICINE CLINIC | Age: 62
End: 2022-02-14

## 2022-02-17 ENCOUNTER — DOCUMENTATION ONLY (OUTPATIENT)
Dept: ORTHOPEDIC SURGERY | Age: 62
End: 2022-02-17

## 2022-02-24 ENCOUNTER — OFFICE VISIT (OUTPATIENT)
Dept: ORTHOPEDIC SURGERY | Age: 62
End: 2022-02-24
Payer: COMMERCIAL

## 2022-02-24 VITALS — BODY MASS INDEX: 44.83 KG/M2 | WEIGHT: 262.6 LBS | HEIGHT: 64 IN

## 2022-02-24 DIAGNOSIS — Z96.651 STATUS POST TOTAL RIGHT KNEE REPLACEMENT: Primary | ICD-10-CM

## 2022-02-24 DIAGNOSIS — Z96.651 STATUS POST RIGHT KNEE REPLACEMENT: ICD-10-CM

## 2022-02-24 PROCEDURE — 99213 OFFICE O/P EST LOW 20 MIN: CPT | Performed by: ORTHOPAEDIC SURGERY

## 2022-02-24 NOTE — PROGRESS NOTES
Alison Weaver (: 1960) is a 63 y.o. female, patient, here for evaluation of the following chief complaint(s):  No chief complaint on file. HPI:    Patient returns today for follow-up of her right total knee. She adamantly states that she does not want to go back to work. Its been 7 months since her knee replacement. She has secondary issues of low back pain and sciatica and she has seen another provider for this. No Known Allergies    Current Outpatient Medications   Medication Sig    pregabalin (Lyrica) 150 mg capsule Take 1 Capsule by mouth three (3) times daily. Max Daily Amount: 450 mg. Indications: neuropathic pain    prucalopride (Motegrity) 2 mg tab Take 1 Tablet by mouth daily.  [START ON 3/1/2022] verapamil ER (CALAN-SR) 240 mg CR tablet Take 1 Tablet by mouth daily. TAKE 1 TABLET BY MOUTH EVERY DAY EVERY NIGHT    oxybutynin chloride XL (DITROPAN XL) 5 mg CR tablet Take 1 Tablet by mouth.  estradioL (ESTRACE) 0.01 % (0.1 mg/gram) vaginal cream Insert  into vagina.  hydroCHLOROthiazide (HYDRODIURIL) 50 mg tablet TAKE 1 TABLET BY MOUTH EVERY DAY    irbesartan (AVAPRO) 300 mg tablet Take 1 Tablet by mouth daily.  senna-docusate (PERICOLACE) 8.6-50 mg per tablet Take 1 Tablet by mouth two (2) times a day. Indications: constipation, opioid induced constipation    polyethylene glycol (MIRALAX) 17 gram packet Take 1 Packet by mouth daily. Indications: constipation    naloxone (NARCAN) 4 mg/actuation nasal spray Use 1 spray intranasally, then discard. Repeat with new spray every 2 min as needed for opioid overdose symptoms, alternating nostrils. Indications: decrease in rate & depth of breathing due to opioid drug, opioid overdose    inulin/cholecalciferol, D3, (FIBER GUMMIES WITH VITAMIN D3 PO) Take 2 Tablets by mouth daily.  OTHER two (2) times a day.  BONE HEALTH DAILY FUNDAMENTALS ( CONTAINS 7 TABS, 3 ARE CHEWABLE, 4 ARE WITH WATER) ,PATIENT TAKES ALL 7 , TWICE DAILY  psyllium husk/aspartame (DAILY FIBER, PSYLLIUM-ASPART, PO) Take  by mouth. 5 TSP WITH 8 OUNCES OF WATER DAILY    famotidine (PEPCID) 20 mg tablet TAKE 1 TABLET BY MOUTH TWICE A DAY    acetaminophen (TYLENOL) 650 mg TbER Take 650 mg by mouth every eight (8) hours. Indications: Pain associated with Arthritis    warfarin (COUMADIN) 2 mg tablet Take 3 tabs daily or as directed by physician based on weekly PT/INR lab values  Indications: deep vein thrombosis prevention, post op DVT prevention (Patient not taking: Reported on 2022)     No current facility-administered medications for this visit.        Past Medical History:   Diagnosis Date    Chronic constipation     Hypertension     Obesity, Class II, BMI 35-39.9     Pre-diabetes 2019    A1C- 6.1    PVC (premature ventricular contraction)     States never been to cardio- has had for many years        Past Surgical History:   Procedure Laterality Date    HX  SECTION N/A     x 3    HX COLONOSCOPY      HX KNEE REPLACEMENT Left 2019    HX TONSILLECTOMY      HX WISDOM TEETH EXTRACTION Bilateral        Family History   Problem Relation Age of Onset    Diabetes Mother     OSTEOARTHRITIS Father     Other Son         MULTIPLE ABSCESS TO HIS BRAIN     Anesth Problems Neg Hx         Social History     Socioeconomic History    Marital status:      Spouse name: Not on file    Number of children: Not on file    Years of education: Not on file    Highest education level: Not on file   Occupational History    Not on file   Tobacco Use    Smoking status: Never Smoker    Smokeless tobacco: Never Used   Vaping Use    Vaping Use: Never used   Substance and Sexual Activity    Alcohol use: Never    Drug use: Never    Sexual activity: Yes   Other Topics Concern    Not on file   Social History Narrative    Not on file     Social Determinants of Health     Financial Resource Strain:     Difficulty of Paying Living Expenses: Not on file Food Insecurity:     Worried About Running Out of Food in the Last Year: Not on file    Janell of Food in the Last Year: Not on file   Transportation Needs:     Lack of Transportation (Medical): Not on file    Lack of Transportation (Non-Medical): Not on file   Physical Activity:     Days of Exercise per Week: Not on file    Minutes of Exercise per Session: Not on file   Stress:     Feeling of Stress : Not on file   Social Connections:     Frequency of Communication with Friends and Family: Not on file    Frequency of Social Gatherings with Friends and Family: Not on file    Attends Muslim Services: Not on file    Active Member of [address] [place] [address] [employer] or Organizations: Not on file    Attends Club or Organization Meetings: Not on file    Marital Status: Not on file   Intimate Partner Violence:     Fear of Current or Ex-Partner: Not on file    Emotionally Abused: Not on file    Physically Abused: Not on file    Sexually Abused: Not on file   Housing Stability:     Unable to Pay for Housing in the Last Year: Not on file    Number of [place] in the Last Year: Not on file    Unstable Housing in the Last Year: Not on file             Vitals:  Ht 5' 4\" (1.626 m)   Wt 262 lb 9.6 oz (119.1 kg)   BMI 45.08 kg/m²    Body mass index is 45.08 kg/m². PHYSICAL EXAM:  Right lower extremity exam shows knee range of motion 0 to 95 degrees. Knee is completely benign appearing no effusion. Completely cool. IMAGING:  XR Results (most recent):  Results from Appointment encounter on 02/24/22    XR KNEE RT 3 V    Narrative  3 x-ray views of the patient's right total knee. No issues are noted. She is well aligned. Patella tracks centrally. ASSESSMENT/PLAN:  1. Status post total right knee replacement  -     XR KNEE RT 3 V  2. Status post right knee replacement    Patient is not quite to maximum medical improvement with her knee. I will take 1 full year.   She states that she is unable to return to her job.  I have no real medical reason to keep her out for her knee but her back is causing her significant disability. She is given 6 more weeks out of work for her back issues. She will follow-up with Danielle Olvera to address that. If she needs to refer her to a spine surgeon that would be appropriate. Patient's back x-rays were reviewed. She has some mild spondylolisthesis really basically benign-appearing lumbar spine. An electronic signature was used to authenticate this note.   --Berna Ramos MD

## 2022-02-24 NOTE — LETTER
2/24/2022    Patient: Azam Chew   YOB: 1960   Date of Visit: 2/24/2022     Nette Becerra MD  [address]    Dear Nette Becerra MD,      Thank you for referring Ms. Rosi Carrera to [facility] for evaluation. My notes for this consultation are attached. If you have questions, please do not hesitate to call me. I look forward to following your patient along with you.       Sincerely,    Ovidio Buenrostro MD

## 2022-03-04 ENCOUNTER — DOCUMENTATION ONLY (OUTPATIENT)
Dept: ORTHOPEDIC SURGERY | Age: 62
End: 2022-03-04

## 2022-03-19 PROBLEM — E66.01 SEVERE OBESITY (HCC): Status: ACTIVE | Noted: 2019-04-02

## 2022-03-19 PROBLEM — M17.11 OSTEOARTHRITIS OF RIGHT KNEE: Status: ACTIVE | Noted: 2019-04-12

## 2022-03-19 PROBLEM — M17.11 LOCALIZED OSTEOARTHRITIS OF RIGHT KNEE: Status: ACTIVE | Noted: 2021-07-20

## 2022-03-24 ENCOUNTER — OFFICE VISIT (OUTPATIENT)
Dept: ORTHOPEDIC SURGERY | Age: 62
End: 2022-03-24
Payer: COMMERCIAL

## 2022-03-24 VITALS — WEIGHT: 258 LBS | HEIGHT: 64 IN | BODY MASS INDEX: 44.05 KG/M2

## 2022-03-24 DIAGNOSIS — M43.16 SPONDYLOLISTHESIS OF LUMBAR REGION: ICD-10-CM

## 2022-03-24 DIAGNOSIS — M48.062 SPINAL STENOSIS OF LUMBAR REGION WITH NEUROGENIC CLAUDICATION: ICD-10-CM

## 2022-03-24 DIAGNOSIS — Z96.651 STATUS POST TOTAL RIGHT KNEE REPLACEMENT: Primary | ICD-10-CM

## 2022-03-24 DIAGNOSIS — M54.16 LUMBAR RADICULOPATHY: ICD-10-CM

## 2022-03-24 DIAGNOSIS — E66.01 MORBID OBESITY DUE TO EXCESS CALORIES (HCC): ICD-10-CM

## 2022-03-24 DIAGNOSIS — M54.50 LUMBAR BACK PAIN: ICD-10-CM

## 2022-03-24 PROCEDURE — 99215 OFFICE O/P EST HI 40 MIN: CPT | Performed by: PHYSICIAN ASSISTANT

## 2022-03-24 RX ORDER — DIAZEPAM 5 MG/1
5 TABLET ORAL AS NEEDED
Qty: 2 TABLET | Refills: 0 | Status: SHIPPED | OUTPATIENT
Start: 2022-03-24 | End: 2022-07-28 | Stop reason: ALTCHOICE

## 2022-03-24 NOTE — PROGRESS NOTES
Janice Rosado (: 1960) is a 63 y.o. female, established patient, here for evaluation of the following chief complaint(s):  Back Pain and Leg Pain         SUBJECTIVE/OBJECTIVE:    Janice Rosado (: 1960) is a 63 y.o. female. Right Total Knee Arthroplasty - Right 2021     Patient presents for follow-up of right total knee arthroplasty as well as for lumbar back pain with radiation to the right lower extremity. Patient underwent right total knee arthroplasty 2021 and had subsequent manipulation under general anesthesia. At this time patient states the right knee is doing well however she can continues to experience lower back pain with radiation to the right buttock posterior thigh, calf and foot. Patient describes numbness, tingling and weakness in the right leg. Patient has been participating in outpatient physical therapy for her back for the last several weeks and has also doing pool therapy on her own 3-4 times a week with instructions that were provided by her physical therapist.  The patient states the right leg is weak and she is only able to ambulate with the support port of a [place] crutch on the right. Patient is currently taking Lyrica 150 mg twice daily, diclofenac 75 mg twice daily and Tylenol 3000 mg daily for pain relief and rates her average daily pain level is somewhere between 5-10/10. Patient has been out of work since her surgery and continues to be hindered by her right leg pain and weakness. Patient denies saddle paresthesia/anesthesia. No Known Allergies    Current Outpatient Medications   Medication Sig    diazePAM (VALIUM) 5 mg tablet Take 1 Tablet by mouth as needed for Anxiety. Max Daily Amount: 480 mg. Take 1 tablet 60 minutes prior to procedure. Take 1 tablet 30 minutes prior to procedure if needed for anxiety. Indications: anxious    pregabalin (Lyrica) 150 mg capsule Take 1 Capsule by mouth three (3) times daily. Max Daily Amount: 450 mg. Indications: neuropathic pain    prucalopride (Motegrity) 2 mg tab Take 1 Tablet by mouth daily.  verapamil ER (CALAN-SR) 240 mg CR tablet Take 1 Tablet by mouth daily. TAKE 1 TABLET BY MOUTH EVERY DAY EVERY NIGHT    oxybutynin chloride XL (DITROPAN XL) 5 mg CR tablet Take 1 Tablet by mouth.  estradioL (ESTRACE) 0.01 % (0.1 mg/gram) vaginal cream Insert  into vagina.  hydroCHLOROthiazide (HYDRODIURIL) 50 mg tablet TAKE 1 TABLET BY MOUTH EVERY DAY    irbesartan (AVAPRO) 300 mg tablet Take 1 Tablet by mouth daily.  senna-docusate (PERICOLACE) 8.6-50 mg per tablet Take 1 Tablet by mouth two (2) times a day. Indications: constipation, opioid induced constipation    warfarin (COUMADIN) 2 mg tablet Take 3 tabs daily or as directed by physician based on weekly PT/INR lab values  Indications: deep vein thrombosis prevention, post op DVT prevention (Patient not taking: Reported on 1/28/2022)    polyethylene glycol (MIRALAX) 17 gram packet Take 1 Packet by mouth daily. Indications: constipation    naloxone (NARCAN) 4 mg/actuation nasal spray Use 1 spray intranasally, then discard. Repeat with new spray every 2 min as needed for opioid overdose symptoms, alternating nostrils. Indications: decrease in rate & depth of breathing due to opioid drug, opioid overdose    inulin/cholecalciferol, D3, (FIBER GUMMIES WITH VITAMIN D3 PO) Take 2 Tablets by mouth daily.  OTHER two (2) times a day. BONE HEALTH DAILY FUNDAMENTALS ( CONTAINS 7 TABS, 3 ARE CHEWABLE, 4 ARE WITH WATER) ,PATIENT TAKES ALL 7 , TWICE DAILY     psyllium husk/aspartame (DAILY FIBER, PSYLLIUM-ASPART, PO) Take  by mouth. 5 TSP WITH 8 OUNCES OF WATER DAILY    famotidine (PEPCID) 20 mg tablet TAKE 1 TABLET BY MOUTH TWICE A DAY    acetaminophen (TYLENOL) 650 mg TbER Take 650 mg by mouth every eight (8) hours. Indications: Pain associated with Arthritis     No current facility-administered medications for this visit.        Social History Socioeconomic History    Marital status:      Spouse name: Not on file    Number of children: Not on file    Years of education: Not on file    Highest education level: Not on file   Occupational History    Not on file   Tobacco Use    Smoking status: Never Smoker    Smokeless tobacco: Never Used   Vaping Use    Vaping Use: Never used   Substance and Sexual Activity    Alcohol use: Never    Drug use: Never    Sexual activity: Yes   Other Topics Concern    Not on file   Social History Narrative    Not on file     Social Determinants of Health     Financial Resource Strain:     Difficulty of Paying Living Expenses: Not on file   Food Insecurity:     Worried About Running Out of Food in the Last Year: Not on file    Janell of Food in the Last Year: Not on file   Transportation Needs:     Lack of Transportation (Medical): Not on file    Lack of Transportation (Non-Medical):  Not on file   Physical Activity:     Days of Exercise per Week: Not on file    Minutes of Exercise per Session: Not on file   Stress:     Feeling of Stress : Not on file   Social Connections:     Frequency of Communication with Friends and Family: Not on file    Frequency of Social Gatherings with Friends and Family: Not on file    Attends Sabianist Services: Not on file    Active Member of [address] [place] [address] [employer] or Organizations: Not on file    Attends Club or Organization Meetings: Not on file    Marital Status: Not on file   Intimate Partner Violence:     Fear of Current or Ex-Partner: Not on file    Emotionally Abused: Not on file    Physically Abused: Not on file    Sexually Abused: Not on file   Housing Stability:     Unable to Pay for Housing in the Last Year: Not on file    Number of [place] in the Last Year: Not on file    Unstable Housing in the Last Year: Not on file       Past Surgical History:   Procedure Laterality Date    HX  SECTION N/A     x 3    HX COLONOSCOPY      HX KNEE REPLACEMENT Left 2019    HX TONSILLECTOMY      HX WISDOM TEETH EXTRACTION Bilateral        Family History   Problem Relation Age of Onset    Diabetes Mother     OSTEOARTHRITIS Father     Other Son         MULTIPLE ABSCESS TO HIS BRAIN     Anesth Problems Neg Hx         OB History    No obstetric history on file. REVIEW OF SYSTEMS:    Patient denies any recent fever, chills, nausea, vomiting, chest pain, abdominal pain, blurred vision, dizziness or shortness of breath. Vitals:    Ht 5' 4\" (1.626 m)   Wt 258 lb (117 kg)   BMI 44.29 kg/m²    Body mass index is 44.29 kg/m². PHYSICAL EXAM:    The patient is alert and oriented x3 and in no acute distress. Patient ambulates with a normal gait without gait aids. Sensory testing in all major nerve distributions in the lower extremities is intact and symmetric. Manual motor testing of the major muscle groups of the lower extremities including dorsiflexion/EHL/ plantarflexion, knee flexion/extension, hip flexion/extension/abduction/ adduction is intact and symmetric in the bilateral lower extremities with the exception of right knee dorsiflexion/EHL/plantarflexion which is weak 3/5, right knee flexion/extension 4/5, right hip flexion 3/5. Deep tendon reflexes +1/4 in the left knee and is considered absent in the right knee and both ankles. Hip range of motion is pain-free bilaterally. Negative straight leg raise on the left is considered positive on the right. No evidence of clonus bilaterally. Babinski's downgoing and symmetric bilaterally. Distal pulses intact and symmetric bilaterally. There is no tenderness to palpation of the thoracic, lumbar or sacral regions. The patient is alert and oriented x 3 and in no acute distress. The postoperative wound is well healed without erythema or drainage. Range of motion of the operative knee demonstrates full extension with flexion to 110 degrees. Mild swelling of the operative knee is noted.  There is no calf tenderness to palpation. Distal motor and sensation is intact. IMAGING:    XR Results (maximum last 3): Results from Appointment encounter on 01/25/22    XR SPINE LUMB MIN 4 V    Narrative  AP, lateral, flexion and extension digital view radiographs of the lumbar spine obtained in the office today and was reviewed demonstrate grade 1 anterolisthesis L4 on L5 7-8 mm which is mobile. Moderate disc height loss is noted at L5-S1 where there is evidence of spondylosis. There is no evidence of fracture, lytic lesion or acute bony abnormality. XR KNEE RT 3 V    Narrative  AP standing, lateral and sunrise digital view radiographs of the right knee obtained in the office today and reviewed with the patient demonstrate anatomic alignment of components with no evidence of hardware loosening or subsidence. Orders Placed This Encounter    MRI LUMB SPINE WO CONT     Standing Status:   Future     Standing Expiration Date:   4/24/2023    REFERRAL TO PHYSICAL THERAPY     Referral Priority:   Routine     Referral Type:   PT/OT/ST     Requested Specialty:   Physical Therapy     Number of Visits Requested:   1    diazePAM (VALIUM) 5 mg tablet     Sig: Take 1 Tablet by mouth as needed for Anxiety. Max Daily Amount: 480 mg. Take 1 tablet 60 minutes prior to procedure. Take 1 tablet 30 minutes prior to procedure if needed for anxiety. Indications: anxious     Dispense:  2 Tablet     Refill:  0          ASSESSMENT/PLAN:      1. Status post total right knee replacement  -     REFERRAL TO PHYSICAL THERAPY  2. Lumbar back pain  -     REFERRAL TO PHYSICAL THERAPY  -     MRI LUMB SPINE WO CONT; Future  3. Lumbar radiculopathy  -     REFERRAL TO PHYSICAL THERAPY  -     MRI LUMB SPINE WO CONT; Future  4. Spondylolisthesis of lumbar region  -     REFERRAL TO PHYSICAL THERAPY  -     MRI LUMB SPINE WO CONT; Future  5.  Spinal stenosis of lumbar region with neurogenic claudication  -     REFERRAL TO PHYSICAL THERAPY  - MRI LUMB SPINE WO CONT; Future  -     diazePAM (VALIUM) 5 mg tablet; Take 1 Tablet by mouth as needed for Anxiety. Max Daily Amount: 480 mg. Take 1 tablet 60 minutes prior to procedure. Take 1 tablet 30 minutes prior to procedure if needed for anxiety. Indications: anxious, Normal, Disp-2 Tablet, R-0  6. Morbid obesity due to excess calories [facility])        Below is the assessment and plan developed based on review of pertinent history, physical exam, labs, studies, and medications. Have discussed the diagnosis and radiographic findings at length and have answered all patient questions to her satisfaction. Have given the patient a prescription for continued outpatient physical therapy for core strengthening. Have encouraged patient to continue pool therapy as tolerated. Have encouraged patient to continue to work on weight loss and encouraged her to continue walking program as tolerated. Given the patient's ongoing pain and motor defecits despite conservative management have referred the patient for MRI lumbar to assess for severe stenosis vs disc herniation. Will plan on seeing the patient back for reevaluation and further treatment recommendations once imaging results are available for review. The patient will need to remain out of work until follow-up appointment in 4 weeks time. The patient understands and agrees to the treatment plan as outlined above. Return in about 4 weeks (around 4/21/2022) for MRI review, Assess progress with treatment plan. Dr. Princess Flores was available for immediate consultation as needed. An electronic signature was used to authenticate this note.   -- Frank Prieto PA-C aching

## 2022-04-04 ENCOUNTER — DOCUMENTATION ONLY (OUTPATIENT)
Dept: ORTHOPEDIC SURGERY | Age: 62
End: 2022-04-04

## 2022-04-20 ENCOUNTER — APPOINTMENT (OUTPATIENT)
Dept: MRI IMAGING | Age: 62
End: 2022-04-20
Attending: PHYSICIAN ASSISTANT

## 2022-04-26 ENCOUNTER — HOSPITAL ENCOUNTER (OUTPATIENT)
Dept: MRI IMAGING | Age: 62
Discharge: HOME | End: 2022-04-26
Attending: PHYSICIAN ASSISTANT
Payer: COMMERCIAL

## 2022-04-26 DIAGNOSIS — M54.50 LUMBAR BACK PAIN: ICD-10-CM

## 2022-04-26 DIAGNOSIS — M43.16 SPONDYLOLISTHESIS OF LUMBAR REGION: ICD-10-CM

## 2022-04-26 DIAGNOSIS — M48.062 SPINAL STENOSIS OF LUMBAR REGION WITH NEUROGENIC CLAUDICATION: ICD-10-CM

## 2022-04-26 DIAGNOSIS — M54.16 LUMBAR RADICULOPATHY: ICD-10-CM

## 2022-04-26 PROCEDURE — 72148 MRI LUMBAR SPINE W/O DYE: CPT

## 2022-04-28 ENCOUNTER — OFFICE VISIT (OUTPATIENT)
Dept: ORTHOPEDIC SURGERY | Age: 62
End: 2022-04-28
Payer: COMMERCIAL

## 2022-04-28 VITALS — HEIGHT: 64 IN | WEIGHT: 265 LBS | BODY MASS INDEX: 45.24 KG/M2

## 2022-04-28 DIAGNOSIS — E66.01 MORBID OBESITY DUE TO EXCESS CALORIES (HCC): ICD-10-CM

## 2022-04-28 DIAGNOSIS — Z96.651 STATUS POST RIGHT KNEE REPLACEMENT: ICD-10-CM

## 2022-04-28 DIAGNOSIS — M47.816 LUMBAR SPONDYLOSIS: ICD-10-CM

## 2022-04-28 DIAGNOSIS — M43.16 SPONDYLOLISTHESIS OF LUMBAR REGION: ICD-10-CM

## 2022-04-28 DIAGNOSIS — M54.16 LUMBAR RADICULOPATHY: ICD-10-CM

## 2022-04-28 DIAGNOSIS — M54.50 LUMBAR BACK PAIN: Primary | ICD-10-CM

## 2022-04-28 PROCEDURE — 99215 OFFICE O/P EST HI 40 MIN: CPT | Performed by: PHYSICIAN ASSISTANT

## 2022-04-28 RX ORDER — PREGABALIN 150 MG/1
150 CAPSULE ORAL 4 TIMES DAILY
Qty: 120 CAPSULE | Refills: 2 | Status: SHIPPED | OUTPATIENT
Start: 2022-04-28

## 2022-04-28 NOTE — PROGRESS NOTES
Myke Brunson (: 1960) is a 63 y.o. female, established  patient, here for evaluation of the following chief complaint(s):  Back Pain and Leg Pain         SUBJECTIVE/OBJECTIVE:    Myke Brunson (: 1960) is a 63 y.o. female who presents for evaluation of lumbar back pain with radiation to the right lower extremity. Patient has a history of right total knee replacement performed 2021 with Dr. Chance Luciano. The patient has had difficulty regaining motion from the knee and has subsequently helped severe back pain with radiation to the right leg. Patient underwent knee manipulation and knee is not a current issue. Primary concern is lumbar back pain with radiation to the right lateral hip, lateral thigh, and calf. Patient describes pain is severe. Patient has had several months of outpatient physical therapy for knee and back and is now doing pool therapy 3 times a week. Patient describes right leg numbness, tingling and weakness. Patient has a known spondylolisthesis at L4-L5. Patient has been taking Lyrica 150 mg 56 times daily, diclofenac and Tylenol on a daily basis and rates her average daily pain level as a 10/10. Patient has pain walking and standing. Patient states she is unable to do grocery shopping and is forced to use a [place] crutch secondary to pain. On the basis of the patient's last evaluation given failure to improve with conservative management she was referred for MRI and returns today for review. The patient has been out of work since her knee surgery. The patient denies saddle paraesthesias/ anaesthesia. Pain Assessment  2022   Location of Pain Back   Severity of Pain 10          ROS    The patient denies fevers, chills, chest pain, shortness of breath, nausea, vomiting. Positive for musculoskeletal issues as described in the HPI.       Vitals:  Ht 5' 4\" (1.626 m)   Wt 265 lb (120.2 kg)   BMI 45.49 kg/m²    Body mass index is 45.49 kg/m².      PHYSICAL EXAM:    The patient is alert and oriented x3 and in no acute distress. The patient is morbidly obese. Sensory testing in all major nerve distributions in the lower extremities is intact and symmetric. Manual motor testing of the major muscle groups of the lower extremities including dorsiflexion/EHL/ plantarflexion, knee flexion/extension, hip flexion/extension/abduction/ adduction is intact in the left lower extremity. Manual motor testing of the right lower extremity demonstrates right dorsiflexion/plantarflexion/EHL function 3/5, right knee flexion/extension 4/5 and right hip flexion 3/5. [place] Deep tendon reflexes +1/4 and symmetric in the bilateral knees and ankles. Hip range of motion is pain-free bilaterally. Negative straight leg raise bilaterally. No evidence of clonus bilaterally. Babinski's downgoing and symmetric bilaterally. Distal pulses intact and symmetric bilaterally. There is no tenderness to palpation of the thoracic, lumbar or sacral regions. IMAGING:    MRI Results (most recent):  Results from [place] encounter on 04/26/22    MRI LUMB SPINE WO CONT    Narrative  EXAM: MRI LUMB SPINE WO CONT    INDICATION: . Low back pain, unspecified    COMPARISON: 5/16/2021    TECHNIQUE: MR imaging of the lumbar spine was performed using the following  sequences: sagittal T1, T2, STIR;  axial T1, T2.    CONTRAST:  None. FINDINGS:    5 mm of anterolisthesis of L4 on L5 is unchanged Vertebral body heights are  maintained. Marrow signal is normal.    The conus medullaris terminates at L1. Signal and caliber of the distal spinal  cord are within normal limits. The paraspinal soft tissues are within normal limits. Lower thoracic spine: At T12-L1 there is a small central bulge of the disc with  canal lower limits of normal in size and slight narrowing of the left neural  foramen similar to the prior study.     L1-L2: There is a small central bulge of the disc with a tiny inferior  protrusion in the midline similar to the prior study. There is no central  stenosis. L2-L3: No herniation or stenosis. L3-L4: No herniation or stenosis. There is mild facet arthropathy    L4-L5: There is by mm of anterolisthesis of L4 and L5 with moderately severe  facet arthropathy with ligamentum hypertrophy with canal lower limits of normal  in size. Neural foramina are patent. L5-S1: No herniation or stenosis. There is moderately severe facet arthropathy    Impression  1. No significant change since 5/16/2021. There is 5 mm of anterolisthesis of L4  on L5 with moderately severe facet arthropathy at L4-L5 and L5-S1 levels without  evidence of central stenosis. 2. Small central bulge of the disc at T12-L1 is unchanged. Small central bulge  of the disc at L1-L2 with a tiny inferior protrusion in the midline is  unchanged. No Known Allergies      Current Outpatient Medications   Medication Sig    diazePAM (VALIUM) 5 mg tablet Take 1 Tablet by mouth as needed for Anxiety. Max Daily Amount: 480 mg. Take 1 tablet 60 minutes prior to procedure. Take 1 tablet 30 minutes prior to procedure if needed for anxiety. Indications: anxious    pregabalin (Lyrica) 150 mg capsule Take 1 Capsule by mouth three (3) times daily. Max Daily Amount: 450 mg. Indications: neuropathic pain    prucalopride (Motegrity) 2 mg tab Take 1 Tablet by mouth daily.  verapamil ER (CALAN-SR) 240 mg CR tablet Take 1 Tablet by mouth daily. TAKE 1 TABLET BY MOUTH EVERY DAY EVERY NIGHT    oxybutynin chloride XL (DITROPAN XL) 5 mg CR tablet Take 1 Tablet by mouth.  estradioL (ESTRACE) 0.01 % (0.1 mg/gram) vaginal cream Insert  into vagina.  hydroCHLOROthiazide (HYDRODIURIL) 50 mg tablet TAKE 1 TABLET BY MOUTH EVERY DAY    irbesartan (AVAPRO) 300 mg tablet Take 1 Tablet by mouth daily.  senna-docusate (PERICOLACE) 8.6-50 mg per tablet Take 1 Tablet by mouth two (2) times a day.  Indications: constipation, opioid induced constipation    warfarin (COUMADIN) 2 mg tablet Take 3 tabs daily or as directed by physician based on weekly PT/INR lab values  Indications: deep vein thrombosis prevention, post op DVT prevention (Patient not taking: Reported on 2022)    polyethylene glycol (MIRALAX) 17 gram packet Take 1 Packet by mouth daily. Indications: constipation    naloxone (NARCAN) 4 mg/actuation nasal spray Use 1 spray intranasally, then discard. Repeat with new spray every 2 min as needed for opioid overdose symptoms, alternating nostrils. Indications: decrease in rate & depth of breathing due to opioid drug, opioid overdose    inulin/cholecalciferol, D3, (FIBER GUMMIES WITH VITAMIN D3 PO) Take 2 Tablets by mouth daily.  OTHER two (2) times a day. BONE HEALTH DAILY FUNDAMENTALS ( CONTAINS 7 TABS, 3 ARE CHEWABLE, 4 ARE WITH WATER) ,PATIENT TAKES ALL 7 , TWICE DAILY     psyllium husk/aspartame (DAILY FIBER, PSYLLIUM-ASPART, PO) Take  by mouth. 5 TSP WITH 8 OUNCES OF WATER DAILY    famotidine (PEPCID) 20 mg tablet TAKE 1 TABLET BY MOUTH TWICE A DAY    acetaminophen (TYLENOL) 650 mg TbER Take 650 mg by mouth every eight (8) hours. Indications: Pain associated with Arthritis     No current facility-administered medications for this visit.          Past Medical History:   Diagnosis Date    Chronic constipation     Hypertension     Obesity, Class II, BMI 35-39.9     Pre-diabetes 2019    A1C- 6.1    PVC (premature ventricular contraction)     States never been to cardio- has had for many years          Past Surgical History:   Procedure Laterality Date    HX  SECTION N/A     x 3    HX COLONOSCOPY      HX KNEE REPLACEMENT Left 2019    HX TONSILLECTOMY      HX WISDOM TEETH EXTRACTION Bilateral          Family History   Problem Relation Age of Onset    Diabetes Mother     OSTEOARTHRITIS Father     Other Son         MULTIPLE ABSCESS TO HIS BRAIN     Anesth Problems Neg Hx           Social History Tobacco Use    Smoking status: Never Smoker    Smokeless tobacco: Never Used   Vaping Use    Vaping Use: Never used   Substance Use Topics    Alcohol use: Never    Drug use: Never                 ASSESSMENT/PLAN:      1. Lumbar back pain  -     REFERRAL TO SPINE INJECTION  2. Spondylolisthesis of lumbar region  -     REFERRAL TO SPINE INJECTION  3. Lumbar radiculopathy  -     REFERRAL TO SPINE INJECTION  4. Lumbar spondylosis  -     REFERRAL TO SPINE INJECTION  5. Status post right knee replacement  6. Morbid obesity due to excess calories [facility])      Below is the assessment and plan developed based on review of pertinent history, physical exam, labs, studies, and medications. Have discussed the diagnosis and radiographic findings at length and answered all patient questions to her questions to their satisfaction. Patient is to continue current pain regimen. Refill the patient's Lyrica 150 mg 4 times a day max. Patient has been taking much greater doses than this and we have discussed issues associated with renal toxicity. Given the patient's ongoing pain despite conservative management have referred the patient for transforaminal epidural steroid injection. Patient should remain out of work until follow-up appointment in 4 weeks time at which time we will reassess her progress. The patient understands and agrees to the treatment plan as outlined above. Return in about 4 weeks (around 5/26/2022) for Assess progress with treatment plan. Dr. Gareth Canavan was available for immediate consult during this encounter. An electronic signature was used to authenticate this note.     -- Cameron Cameron PA-C

## 2022-05-09 ENCOUNTER — DOCUMENTATION ONLY (OUTPATIENT)
Dept: ORTHOPEDIC SURGERY | Age: 62
End: 2022-05-09

## 2022-06-16 ENCOUNTER — OFFICE VISIT (OUTPATIENT)
Dept: ORTHOPEDIC SURGERY | Age: 62
End: 2022-06-16
Payer: COMMERCIAL

## 2022-06-16 VITALS — WEIGHT: 285 LBS | BODY MASS INDEX: 48.65 KG/M2 | HEIGHT: 64 IN

## 2022-06-16 DIAGNOSIS — Z96.651 STATUS POST RIGHT KNEE REPLACEMENT: ICD-10-CM

## 2022-06-16 DIAGNOSIS — M54.16 LUMBAR RADICULOPATHY: ICD-10-CM

## 2022-06-16 DIAGNOSIS — M54.50 LUMBAR BACK PAIN: Primary | ICD-10-CM

## 2022-06-16 DIAGNOSIS — M43.16 SPONDYLOLISTHESIS OF LUMBAR REGION: ICD-10-CM

## 2022-06-16 DIAGNOSIS — E66.01 MORBID OBESITY DUE TO EXCESS CALORIES (HCC): ICD-10-CM

## 2022-06-16 DIAGNOSIS — M51.36 BULGE OF LUMBAR DISC WITHOUT MYELOPATHY: ICD-10-CM

## 2022-06-16 DIAGNOSIS — M47.816 LUMBAR SPONDYLOSIS: ICD-10-CM

## 2022-06-16 PROCEDURE — 99214 OFFICE O/P EST MOD 30 MIN: CPT | Performed by: PHYSICIAN ASSISTANT

## 2022-06-16 NOTE — PROGRESS NOTES
Chiquita Cisneros (: 1960) is a 63 y.o. female, established  patient, here for evaluation of the following chief complaint(s):  Back Pain and Leg Pain         SUBJECTIVE/OBJECTIVE:    Chiquita Cisneros (: 1960) is a 63 y.o. female who presents for evaluation of lumbar back pain. The patient has history of right total knee replacement performed 2021 with Dr. Anjelica Domingeuz. While recovering from knee replacement patient had increased lumbar back pain with radiation to the right leg. Patient underwent subsequent knee manipulation and knee is doing relatively well at this time. Patient's primary focus for the last 6-8 months has been lumbar back pain with radiation to the right leg. Patient describes a severe right leg weakness, numbness and tingling. The patient has participated in outpatient physical therapy for several months and is now doing pool therapy which she states has been more beneficial.  Patient continues to have pain with standing and walking. States that her standing tolerance is about 30 minutes. The patient was using a [place] crutch for ambulation however, today she is not using any gait aids. Patient states she has been taking Tylenol and Lyrica for pain relief and rates her current level of pain as a 5/10. The patient's ability to return to work has been limited by back and leg pain and leg weakness. She states she continues have difficulty lifting her right leg to go up and down stairs and get into vehicles. The patient denies saddle paraesthesias/ anaesthesia. Pain Assessment  2022   Location of Pain Back;Leg   Severity of Pain 5          ROS    The patient denies fevers, chills, chest pain, shortness of breath, nausea, vomiting. Positive for musculoskeletal issues as described in the HPI. Vitals:  Ht 5' 4\" (1.626 m)   Wt 285 lb (129.3 kg)   BMI 48.92 kg/m²    Body mass index is 48.92 kg/m².       PHYSICAL EXAM:    The patient is alert and oriented x3 and in no acute distress. Patient ambulates with mild right antalgia without gait aids. Sensory testing in all major nerve distributions in the lower extremities is intact and symmetric. Manual motor testing of the major muscle groups of the lower extremities including dorsiflexion/EHL/ plantarflexion, knee flexion/extension, hip flexion/extension/abduction/ adduction is intact and symmetric in the bilateral lower extremities with the exception of right dorsiflexion/plantarflexion/EHL which is rated 3/5, right knee flexion/extension 4/5, right hip flexion/abduction 3/5. Deep tendon reflexes +1/4 and symmetric in the bilateral knees and ankles. Hip range of motion is pain-free bilaterally. Negative straight leg raise bilaterally. No evidence of clonus bilaterally. Babinski's downgoing and symmetric bilaterally. Distal pulses intact and symmetric bilaterally. There is  tenderness to palpation of the lateral lower lumbar and sacral regions. IMAGING:    XR Results (most recent):  Results from Appointment encounter on 02/24/22    XR KNEE RT 3 V    Narrative  3 x-ray views of the patient's right total knee. No issues are noted. She is well aligned. Patella tracks centrally. MRI Results (most recent):  Results from [place] encounter on 04/26/22    MRI LUMB SPINE WO CONT    Narrative  EXAM: MRI LUMB SPINE WO CONT    INDICATION: . Low back pain, unspecified    COMPARISON: 5/16/2021    TECHNIQUE: MR imaging of the lumbar spine was performed using the following  sequences: sagittal T1, T2, STIR;  axial T1, T2.    CONTRAST:  None. FINDINGS:    5 mm of anterolisthesis of L4 on L5 is unchanged Vertebral body heights are  maintained. Marrow signal is normal.    The conus medullaris terminates at L1. Signal and caliber of the distal spinal  cord are within normal limits. The paraspinal soft tissues are within normal limits.     Lower thoracic spine: At T12-L1 there is a small central bulge of the disc with  canal lower limits of normal in size and slight narrowing of the left neural  foramen similar to the prior study. L1-L2: There is a small central bulge of the disc with a tiny inferior  protrusion in the midline similar to the prior study. There is no central  stenosis. L2-L3: No herniation or stenosis. L3-L4: No herniation or stenosis. There is mild facet arthropathy    L4-L5: There is by mm of anterolisthesis of L4 and L5 with moderately severe  facet arthropathy with ligamentum hypertrophy with canal lower limits of normal  in size. Neural foramina are patent. L5-S1: No herniation or stenosis. There is moderately severe facet arthropathy    Impression  1. No significant change since 5/16/2021. There is 5 mm of anterolisthesis of L4  on L5 with moderately severe facet arthropathy at L4-L5 and L5-S1 levels without  evidence of central stenosis. 2. Small central bulge of the disc at T12-L1 is unchanged. Small central bulge  of the disc at L1-L2 with a tiny inferior protrusion in the midline is  unchanged. No Known Allergies      Current Outpatient Medications   Medication Sig    irbesartan (AVAPRO) 300 mg tablet TAKE 1 TABLET BY MOUTH EVERY DAY    pregabalin (LYRICA) 150 mg capsule Take 1 Capsule by mouth four (4) times daily. Max Daily Amount: 600 mg. Indications: Neuropathic pain    diazePAM (VALIUM) 5 mg tablet Take 1 Tablet by mouth as needed for Anxiety. Max Daily Amount: 480 mg. Take 1 tablet 60 minutes prior to procedure. Take 1 tablet 30 minutes prior to procedure if needed for anxiety. Indications: anxious    prucalopride (Motegrity) 2 mg tab Take 1 Tablet by mouth daily.  verapamil ER (CALAN-SR) 240 mg CR tablet Take 1 Tablet by mouth daily. TAKE 1 TABLET BY MOUTH EVERY DAY EVERY NIGHT    oxybutynin chloride XL (DITROPAN XL) 5 mg CR tablet Take 1 Tablet by mouth.  estradioL (ESTRACE) 0.01 % (0.1 mg/gram) vaginal cream Insert  into vagina.     hydroCHLOROthiazide (HYDRODIURIL) 50 mg tablet TAKE 1 TABLET BY MOUTH EVERY DAY    senna-docusate (PERICOLACE) 8.6-50 mg per tablet Take 1 Tablet by mouth two (2) times a day. Indications: constipation, opioid induced constipation    warfarin (COUMADIN) 2 mg tablet Take 3 tabs daily or as directed by physician based on weekly PT/INR lab values  Indications: deep vein thrombosis prevention, post op DVT prevention (Patient not taking: Reported on 2022)    polyethylene glycol (MIRALAX) 17 gram packet Take 1 Packet by mouth daily. Indications: constipation    naloxone (NARCAN) 4 mg/actuation nasal spray Use 1 spray intranasally, then discard. Repeat with new spray every 2 min as needed for opioid overdose symptoms, alternating nostrils. Indications: decrease in rate & depth of breathing due to opioid drug, opioid overdose    inulin/cholecalciferol, D3, (FIBER GUMMIES WITH VITAMIN D3 PO) Take 2 Tablets by mouth daily.  OTHER two (2) times a day. BONE HEALTH DAILY FUNDAMENTALS ( CONTAINS 7 TABS, 3 ARE CHEWABLE, 4 ARE WITH WATER) ,PATIENT TAKES ALL 7 , TWICE DAILY     psyllium husk/aspartame (DAILY FIBER, PSYLLIUM-ASPART, PO) Take  by mouth. 5 TSP WITH 8 OUNCES OF WATER DAILY    famotidine (PEPCID) 20 mg tablet TAKE 1 TABLET BY MOUTH TWICE A DAY    acetaminophen (TYLENOL) 650 mg TbER Take 650 mg by mouth every eight (8) hours. Indications: Pain associated with Arthritis     No current facility-administered medications for this visit.          Past Medical History:   Diagnosis Date    Chronic constipation     Hypertension     Obesity, Class II, BMI 35-39.9     Pre-diabetes 2019    A1C- 6.1    PVC (premature ventricular contraction)     States never been to cardio- has had for many years          Past Surgical History:   Procedure Laterality Date    HX  SECTION N/A     x 3    HX COLONOSCOPY      HX KNEE REPLACEMENT Left 2019    HX TONSILLECTOMY      HX WISDOM TEETH EXTRACTION Bilateral          Family History   Problem Relation Age of Onset    Diabetes Mother     OSTEOARTHRITIS Father     Other Son         MULTIPLE ABSCESS TO HIS BRAIN     Anesth Problems Neg Hx           Social History     Tobacco Use    Smoking status: Never Smoker    Smokeless tobacco: Never Used   Vaping Use    Vaping Use: Never used   Substance Use Topics    Alcohol use: Never    Drug use: Never                 ASSESSMENT/PLAN:      1. Lumbar back pain  2. Bulge of lumbar disc without myelopathy  3. Lumbar radiculopathy  4. Spondylolisthesis of lumbar region  5. Lumbar spondylosis  6. Status post right knee replacement  7. Morbid obesity due to excess calories [facility])      Below is the assessment and plan developed based on review of pertinent history, physical exam, labs, studies, and medications. Have discussed the patients diagnosis and radiographic findings at length and have answered all patient questions to her questions to her satisfaction. The patient states that her pain and right leg weakness limit her ability to return to work at this time. Patient will continue going to pool therapy 3-4 times weekly and to continue working on leg strengthening. Patient does not wish to return to land-based physical therapy or consider epidural steroid injections at this time. Will plan on seeing the patient back for reevaluation in  6 weeks time should symptoms persist or worsen. The patient should remain out of work until follow-up visit. The patient understands and agrees to the treatment plan as outlined above. Return in about 6 weeks (around 7/28/2022) for Assess progress with treatment plan. Dr. Lakia Huddleston was available for immediate consult during this encounter. An electronic signature was used to authenticate this note.     -- Desean Erazo PA-C

## 2022-07-07 ENCOUNTER — TELEPHONE (OUTPATIENT)
Dept: INTERNAL MEDICINE CLINIC | Age: 62
End: 2022-07-07

## 2022-07-07 DIAGNOSIS — I10 ESSENTIAL HYPERTENSION: ICD-10-CM

## 2022-07-07 RX ORDER — HYDROCHLOROTHIAZIDE 50 MG/1
TABLET ORAL
Qty: 90 TABLET | Refills: 1 | Status: SHIPPED | OUTPATIENT
Start: 2022-07-07 | End: 2022-11-03 | Stop reason: SDUPTHER

## 2022-07-28 ENCOUNTER — OFFICE VISIT (OUTPATIENT)
Dept: INTERNAL MEDICINE CLINIC | Age: 62
End: 2022-07-28
Payer: COMMERCIAL

## 2022-07-28 VITALS
HEIGHT: 64 IN | BODY MASS INDEX: 46.61 KG/M2 | TEMPERATURE: 97.4 F | OXYGEN SATURATION: 93 % | WEIGHT: 273 LBS | RESPIRATION RATE: 18 BRPM | DIASTOLIC BLOOD PRESSURE: 70 MMHG | SYSTOLIC BLOOD PRESSURE: 128 MMHG | HEART RATE: 76 BPM

## 2022-07-28 DIAGNOSIS — Z23 ENCOUNTER FOR IMMUNIZATION: ICD-10-CM

## 2022-07-28 DIAGNOSIS — M48.061 SPINAL STENOSIS OF LUMBAR REGION, UNSPECIFIED WHETHER NEUROGENIC CLAUDICATION PRESENT: ICD-10-CM

## 2022-07-28 DIAGNOSIS — E78.00 HIGH CHOLESTEROL: Primary | ICD-10-CM

## 2022-07-28 DIAGNOSIS — R82.90 ABNORMAL URINE ODOR: ICD-10-CM

## 2022-07-28 DIAGNOSIS — I10 ESSENTIAL HYPERTENSION: ICD-10-CM

## 2022-07-28 DIAGNOSIS — R63.5 WEIGHT GAIN: ICD-10-CM

## 2022-07-28 DIAGNOSIS — R35.0 URINE FREQUENCY: ICD-10-CM

## 2022-07-28 DIAGNOSIS — R73.03 PRE-DIABETES: ICD-10-CM

## 2022-07-28 DIAGNOSIS — K21.9 GASTROESOPHAGEAL REFLUX DISEASE WITHOUT ESOPHAGITIS: ICD-10-CM

## 2022-07-28 LAB
BILIRUB UR QL STRIP: NEGATIVE
GLUCOSE UR-MCNC: NEGATIVE MG/DL
KETONES P FAST UR STRIP-MCNC: NEGATIVE MG/DL
PH UR STRIP: 6 [PH] (ref 4.6–8)
PROT UR QL STRIP: NEGATIVE
SP GR UR STRIP: 1.01 (ref 1–1.03)
UA UROBILINOGEN AMB POC: NORMAL (ref 0.2–1)
URINALYSIS CLARITY POC: CLEAR
URINALYSIS COLOR POC: YELLOW
URINE BLOOD POC: NEGATIVE
URINE LEUKOCYTES POC: NEGATIVE
URINE NITRITES POC: NEGATIVE

## 2022-07-28 PROCEDURE — 90471 IMMUNIZATION ADMIN: CPT | Performed by: INTERNAL MEDICINE

## 2022-07-28 PROCEDURE — 90715 TDAP VACCINE 7 YRS/> IM: CPT | Performed by: INTERNAL MEDICINE

## 2022-07-28 PROCEDURE — 99214 OFFICE O/P EST MOD 30 MIN: CPT | Performed by: INTERNAL MEDICINE

## 2022-07-28 PROCEDURE — 81002 URINALYSIS NONAUTO W/O SCOPE: CPT | Performed by: INTERNAL MEDICINE

## 2022-07-28 RX ORDER — FAMOTIDINE 20 MG/1
20 TABLET, FILM COATED ORAL 2 TIMES DAILY
Qty: 60 TABLET | Refills: 2 | Status: SHIPPED | OUTPATIENT
Start: 2022-07-28

## 2022-07-28 RX ORDER — OXYBUTYNIN CHLORIDE 5 MG/1
5 TABLET, EXTENDED RELEASE ORAL DAILY
Qty: 90 TABLET | Refills: 1 | Status: SHIPPED | OUTPATIENT
Start: 2022-07-28

## 2022-07-28 NOTE — PROGRESS NOTES
Ms. Douglas Merediht is presenting to follow up     CC:  Follow-up, Hypertension, Constipation, and Weight Management       HPI:    She is a 63 y.o. female with a hx of HTN, chronic back pain  who presents for evaluation of urinary symptoms  and other issues     Patient had right knee full replacement and dealing with that now   Patient is struggling with neuropathy and started on lyrica recently and it was helping. Having pain on right hip advised to discuss with her ortho / pain on same side as knee replacement    HTN: patient is on verapamil , avapro 300mg daily, HCTZ. Denies chest pain and dyspnea, PND and orthopnea. Pre diabetes: states since January stopped eating sugar. \" Following a low carb diet\"   Currently weight is 273 ( same as previous visit)  She would like to start ozempic her sister is on it and desires the benefits specially weight loss     Review of systems:  Constitutional: negative for fever, chills, weight loss, night sweats   10 systems reviewed and negative other then HPI       Past Medical History:   Diagnosis Date    Chronic constipation     Hypertension     Obesity, Class II, BMI 35-39.9     Pre-diabetes 2019    A1C- 6.1    PVC (premature ventricular contraction)     States never been to cardio- has had for many years        Past Surgical History:   Procedure Laterality Date    HX  SECTION N/A     x 3    HX COLONOSCOPY      HX KNEE REPLACEMENT Left 2019    HX TONSILLECTOMY      HX WISDOM TEETH EXTRACTION Bilateral        No Known Allergies    Current Outpatient Medications on File Prior to Visit   Medication Sig Dispense Refill    hydroCHLOROthiazide (HYDRODIURIL) 50 mg tablet TAKE 1 TABLET BY MOUTH EVERY DAY 90 Tablet 1    irbesartan (AVAPRO) 300 mg tablet TAKE 1 TABLET BY MOUTH EVERY DAY 90 Tablet 0    pregabalin (LYRICA) 150 mg capsule Take 1 Capsule by mouth four (4) times daily. Max Daily Amount: 600 mg.  Indications: Neuropathic pain 120 Capsule 2    oxybutynin chloride XL (DITROPAN XL) 5 mg CR tablet Take 1 Tablet by mouth.      estradioL (ESTRACE) 0.01 % (0.1 mg/gram) vaginal cream Insert  into vagina. polyethylene glycol (MIRALAX) 17 gram packet Take 1 Packet by mouth daily. Indications: constipation 21 Packet 0    naloxone (NARCAN) 4 mg/actuation nasal spray Use 1 spray intranasally, then discard. Repeat with new spray every 2 min as needed for opioid overdose symptoms, alternating nostrils. Indications: decrease in rate & depth of breathing due to opioid drug, opioid overdose 1 Each 0    famotidine (PEPCID) 20 mg tablet TAKE 1 TABLET BY MOUTH TWICE A DAY 60 Tab 2    acetaminophen (TYLENOL) 650 mg TbER Take 650 mg by mouth every eight (8) hours. Indications: Pain associated with Arthritis      diazePAM (VALIUM) 5 mg tablet Take 1 Tablet by mouth as needed for Anxiety. Max Daily Amount: 480 mg. Take 1 tablet 60 minutes prior to procedure. Take 1 tablet 30 minutes prior to procedure if needed for anxiety. Indications: anxious (Patient not taking: Reported on 7/28/2022) 2 Tablet 0    prucalopride (Motegrity) 2 mg tab Take 1 Tablet by mouth daily. (Patient not taking: Reported on 7/28/2022) 90 Each 1    verapamil ER (CALAN-SR) 240 mg CR tablet Take 1 Tablet by mouth daily. TAKE 1 TABLET BY MOUTH EVERY DAY EVERY NIGHT (Patient not taking: Reported on 7/28/2022) 90 Tablet 1    senna-docusate (PERICOLACE) 8.6-50 mg per tablet Take 1 Tablet by mouth two (2) times a day. Indications: constipation, opioid induced constipation (Patient not taking: Reported on 7/28/2022) 50 Tablet 0    warfarin (COUMADIN) 2 mg tablet Take 3 tabs daily or as directed by physician based on weekly PT/INR lab values  Indications: deep vein thrombosis prevention, post op DVT prevention (Patient not taking: No sig reported) 90 Tablet 0    inulin/cholecalciferol, D3, (FIBER GUMMIES WITH VITAMIN D3 PO) Take 2 Tablets by mouth daily. OTHER two (2) times a day.  BONE HEALTH DAILY FUNDAMENTALS ( CONTAINS 7 TABS, 3 ARE CHEWABLE, 4 ARE WITH WATER) ,PATIENT TAKES ALL 7 , TWICE DAILY  (Patient not taking: Reported on 7/28/2022)      psyllium husk/aspartame (DAILY FIBER, PSYLLIUM-ASPART, PO) Take  by mouth. 5 TSP WITH 8 OUNCES OF WATER DAILY (Patient not taking: Reported on 7/28/2022)       No current facility-administered medications on file prior to visit. family history includes Diabetes in her mother; OSTEOARTHRITIS in her father; Other in her son. Social History     Socioeconomic History    Marital status:      Spouse name: Not on file    Number of children: Not on file    Years of education: Not on file    Highest education level: Not on file   Occupational History    Not on file   Tobacco Use    Smoking status: Never    Smokeless tobacco: Never   Vaping Use    Vaping Use: Never used   Substance and Sexual Activity    Alcohol use: Never    Drug use: Never    Sexual activity: Yes   Other Topics Concern    Not on file   Social History Narrative    Not on file     Social Determinants of Health     Financial Resource Strain: High Risk    Difficulty of Paying Living Expenses: Very hard   Food Insecurity: Food Insecurity Present    Worried About Running Out of Food in the Last Year: Never true    Ran Out of Food in the Last Year: Sometimes true   Transportation Needs: No Transportation Needs    Lack of Transportation (Medical): No    Lack of Transportation (Non-Medical):  No   Physical Activity: Not on file   Stress: Not on file   Social Connections: Not on file   Intimate Partner Violence: Not on file   Housing Stability: High Risk    Unable to Pay for Housing in the Last Year: Yes    Number of Places Lived in the Last Year: Not on file    Unstable Housing in the Last Year: No       Visit Vitals  /70   Pulse 76   Temp 97.4 °F (36.3 °C) (Temporal)   Resp 18   Ht 5' 4\" (1.626 m)   Wt 273 lb (123.8 kg)   SpO2 93%   BMI 46.86 kg/m²     General:  Well appearing female no acute distress  HEENT: PERRL,normal conjunctiva  Neck:  Supple. Thyroid normal size, nontender, without nodules. No carotid bruit. No masses or lymphadenopathy  Respiratory: no respiratory distress,  no wheezing, no rhonchi, no rales. No chest wall tenderness. Cardiovascular:  RRR, normal S1S2, no murmur. Gastrointestinal: normal bowel sounds, soft, nontender, without masses. No hepatosplenomegaly. Extremities +2 pulses, no edema, normal sensation   Musculoskeletal:  Normal gait. Normal digits and nails. Normal strength and tone, no atrophy, and no abnormal movement. Skin:  No rash, no lesions, no ulcers. Skin warm, normal turgor, without induration or nodules. Neuro:  A and OX4, fluent speech, cranial nerves normal 2-12.    Psych:  Normal affect      Lab Results   Component Value Date/Time    WBC 5.0 07/12/2021 12:18 PM    HGB 9.2 (L) 07/21/2021 04:16 AM    HCT 35.8 07/12/2021 12:18 PM    PLATELET 602 92/41/9605 12:18 PM    MCV 87.7 07/12/2021 12:18 PM     Lab Results   Component Value Date/Time    Sodium 141 07/21/2021 04:16 AM    Potassium 3.4 (L) 07/21/2021 04:16 AM    Chloride 108 07/21/2021 04:16 AM    CO2 25 07/21/2021 04:16 AM    Anion gap 8 07/21/2021 04:16 AM    Glucose 172 (H) 07/21/2021 04:16 AM    BUN 24 (H) 07/21/2021 04:16 AM    Creatinine 0.71 07/21/2021 04:16 AM    BUN/Creatinine ratio 34 (H) 07/21/2021 04:16 AM    GFR est AA >60 07/21/2021 04:16 AM    GFR est non-AA >60 07/21/2021 04:16 AM    Calcium 8.5 07/21/2021 04:16 AM     Lab Results   Component Value Date/Time    Cholesterol, total 186 03/29/2021 12:07 PM    HDL Cholesterol 55 03/29/2021 12:07 PM    LDL, calculated 111 (H) 03/29/2021 12:07 PM    VLDL, calculated 20 03/29/2021 12:07 PM    Triglyceride 111 03/29/2021 12:07 PM     Lab Results   Component Value Date/Time    TSH 2.780 03/29/2021 12:07 PM     Lab Results   Component Value Date/Time    Hemoglobin A1c 6.2 (H) 07/12/2021 12:18 PM     No results found for: Raquel Gamez, Tristian Sanderson, KENNETH Assessment and Plan:     1. High cholesterol  Lifestyle modifications     2. Essential hypertension  Well controlled on current regimen verapamil, HCTZ and avapro    3. Spinal stenosis of lumbar region, unspecified whether neurogenic claudication present  Hx of knee R replacement  having more R hip pain and will follow up with ortho Dr Pebbles Canela    4. Urine frequency    5. Abnormal urine odor  - AMB POC URINALYSIS DIP STICK MANUAL W/O MICRO  Checked urine and normal   Increase water intake     6. Gastroesophageal reflux disease without esophagitis  - famotidine (PEPCID) 20 mg tablet     7.  Pre diabetes: patient wants to start ozempic, will check labs today   And see if now in diabetic range     Reminded to schedule appointment with gyn       Ellie Talavera MD

## 2022-07-29 LAB
ALBUMIN SERPL-MCNC: 4.4 G/DL (ref 3.5–5)
ALBUMIN/GLOB SERPL: 1.3 {RATIO} (ref 1.1–2.2)
ALP SERPL-CCNC: 71 U/L (ref 45–117)
ALT SERPL-CCNC: 22 U/L (ref 12–78)
ANION GAP SERPL CALC-SCNC: 8 MMOL/L (ref 5–15)
AST SERPL-CCNC: 11 U/L (ref 15–37)
BASOPHILS # BLD: 0 K/UL (ref 0–0.1)
BASOPHILS NFR BLD: 1 % (ref 0–1)
BILIRUB SERPL-MCNC: 0.3 MG/DL (ref 0.2–1)
BUN SERPL-MCNC: 18 MG/DL (ref 6–20)
BUN/CREAT SERPL: 21 (ref 12–20)
CALCIUM SERPL-MCNC: 10 MG/DL (ref 8.5–10.1)
CHLORIDE SERPL-SCNC: 103 MMOL/L (ref 97–108)
CHOLEST SERPL-MCNC: 203 MG/DL
CO2 SERPL-SCNC: 28 MMOL/L (ref 21–32)
CREAT SERPL-MCNC: 0.84 MG/DL (ref 0.55–1.02)
DIFFERENTIAL METHOD BLD: ABNORMAL
EOSINOPHIL # BLD: 0.1 K/UL (ref 0–0.4)
EOSINOPHIL NFR BLD: 2 % (ref 0–7)
ERYTHROCYTE [DISTWIDTH] IN BLOOD BY AUTOMATED COUNT: 15.3 % (ref 11.5–14.5)
EST. AVERAGE GLUCOSE BLD GHB EST-MCNC: 140 MG/DL
GLOBULIN SER CALC-MCNC: 3.4 G/DL (ref 2–4)
GLUCOSE SERPL-MCNC: 121 MG/DL (ref 65–100)
HBA1C MFR BLD: 6.5 % (ref 4–5.6)
HCT VFR BLD AUTO: 41.5 % (ref 35–47)
HDLC SERPL-MCNC: 54 MG/DL
HDLC SERPL: 3.8 {RATIO} (ref 0–5)
HGB BLD-MCNC: 13 G/DL (ref 11.5–16)
IMM GRANULOCYTES # BLD AUTO: 0 K/UL (ref 0–0.04)
IMM GRANULOCYTES NFR BLD AUTO: 0 % (ref 0–0.5)
LDLC SERPL CALC-MCNC: 128.2 MG/DL (ref 0–100)
LYMPHOCYTES # BLD: 2.3 K/UL (ref 0.8–3.5)
LYMPHOCYTES NFR BLD: 46 % (ref 12–49)
MCH RBC QN AUTO: 28 PG (ref 26–34)
MCHC RBC AUTO-ENTMCNC: 31.3 G/DL (ref 30–36.5)
MCV RBC AUTO: 89.2 FL (ref 80–99)
MONOCYTES # BLD: 0.3 K/UL (ref 0–1)
MONOCYTES NFR BLD: 6 % (ref 5–13)
NEUTS SEG # BLD: 2.3 K/UL (ref 1.8–8)
NEUTS SEG NFR BLD: 45 % (ref 32–75)
NRBC # BLD: 0 K/UL (ref 0–0.01)
NRBC BLD-RTO: 0 PER 100 WBC
PLATELET # BLD AUTO: 245 K/UL (ref 150–400)
PMV BLD AUTO: 11.1 FL (ref 8.9–12.9)
POTASSIUM SERPL-SCNC: 3.3 MMOL/L (ref 3.5–5.1)
PROT SERPL-MCNC: 7.8 G/DL (ref 6.4–8.2)
RBC # BLD AUTO: 4.65 M/UL (ref 3.8–5.2)
SODIUM SERPL-SCNC: 139 MMOL/L (ref 136–145)
TRIGL SERPL-MCNC: 104 MG/DL (ref ?–150)
TSH SERPL DL<=0.05 MIU/L-ACNC: 2.6 UIU/ML (ref 0.36–3.74)
VLDLC SERPL CALC-MCNC: 20.8 MG/DL
WBC # BLD AUTO: 5 K/UL (ref 3.6–11)

## 2022-08-02 ENCOUNTER — TELEPHONE (OUTPATIENT)
Dept: INTERNAL MEDICINE CLINIC | Age: 62
End: 2022-08-02

## 2022-08-02 ENCOUNTER — PATIENT MESSAGE (OUTPATIENT)
Dept: INTERNAL MEDICINE CLINIC | Age: 62
End: 2022-08-02

## 2022-08-02 DIAGNOSIS — E11.9 CONTROLLED TYPE 2 DIABETES MELLITUS WITHOUT COMPLICATION, WITHOUT LONG-TERM CURRENT USE OF INSULIN (HCC): ICD-10-CM

## 2022-08-02 NOTE — TELEPHONE ENCOUNTER
notified that HR-CAflutter 70's -80's.MD aware drip was stopped. No new orders received. Will monitor   Pt called requesting assistance with logging into [employer]. Psr assisted pt by providing temporary password and pt confirms she is now successfully able to log in and has now updated her password to her choosing. No further action required.

## 2022-08-03 NOTE — PROGRESS NOTES
Sent message via [address]   Potassium is slightly low however patient is on Ditropan and cannot take potassium supplement due to drug drug interaction discussed increasing potassium intake in diet  Blood sugars now in early diabetic range patient can work hard on a low sugar low carbohydrate diet high in protein and vegetables with   regular exercise to improve this Ozempic or metformin are also options  Normal blood count   Cholesterol is elevated with LDL at 128 goal is less than 100   If diabetes not reversed will need cholesterol medication  Repeat labs in November appointment

## 2022-08-03 NOTE — TELEPHONE ENCOUNTER
From: Landry Armstrong  To: Yaakov Bauer MD  Sent: 8/2/2022 11:28 AM EDT  Subject: Question regarding METABOLIC PANEL, COMPREHENSIVE    Good morning Dr.Appa Im not understanding the meaning of the numbers can you please explain what are:  Calcium 10  Bilirubin 0.3  Alt(sg-pt) 22  Ast(shot) 11  A-g ratio 1.3  Glucose (mg/do 121. Is this number type 1 or type 2 diabetes. Will Ozempic help to regulate these numbers. Do u think Gina Moreno would be good for me. Bun mg/do 18   Bun/creatinine ratio 21    Explain this please. What do I need to do moving forward with these results.   You can call me at [phone] if you need to talk to me

## 2022-08-04 ENCOUNTER — TELEPHONE (OUTPATIENT)
Dept: INTERNAL MEDICINE CLINIC | Age: 62
End: 2022-08-04

## 2022-08-04 ENCOUNTER — OFFICE VISIT (OUTPATIENT)
Dept: ORTHOPEDIC SURGERY | Age: 62
End: 2022-08-04
Payer: COMMERCIAL

## 2022-08-04 VITALS — BODY MASS INDEX: 44.39 KG/M2 | WEIGHT: 260 LBS | HEIGHT: 64 IN

## 2022-08-04 DIAGNOSIS — M51.36 BULGE OF LUMBAR DISC WITHOUT MYELOPATHY: Primary | ICD-10-CM

## 2022-08-04 DIAGNOSIS — M43.16 SPONDYLOLISTHESIS OF LUMBAR REGION: ICD-10-CM

## 2022-08-04 DIAGNOSIS — M54.16 LUMBAR RADICULOPATHY: ICD-10-CM

## 2022-08-04 DIAGNOSIS — M47.816 LUMBAR SPONDYLOSIS: ICD-10-CM

## 2022-08-04 DIAGNOSIS — M54.50 LUMBAR BACK PAIN: ICD-10-CM

## 2022-08-04 PROCEDURE — 99215 OFFICE O/P EST HI 40 MIN: CPT | Performed by: PHYSICIAN ASSISTANT

## 2022-08-04 RX ORDER — DICLOFENAC SODIUM 75 MG/1
75 TABLET, DELAYED RELEASE ORAL 2 TIMES DAILY WITH MEALS
Qty: 60 TABLET | Refills: 1 | Status: SHIPPED | OUTPATIENT
Start: 2022-08-04

## 2022-08-04 NOTE — PROGRESS NOTES
I left the patient a message to call me back at the office re:lab results. Signed By: Zola Schlatter, LPN    August 4, 0843

## 2022-08-04 NOTE — PROGRESS NOTES
Karina Scherer (: 1960) is a [address] [facility] female, established  patient, here for evaluation of the following chief complaint(s):  Back Pain         SUBJECTIVE/OBJECTIVE:    Karina Scherer (: 1960) is a [address] [facility] female who presents for evaluation of lumbar back pain. patient has a history of right total knee replacement performed 2021 with Dr. Delisa Ramos. While recovering from the total knee replacement patient had increasing lumbar back pain with radiation to the right lower extremity. Patient has now been dealing with back pain for greater than 1 year. Patient describes low back pain and right leg weakness, numbness and tingling. The patient has had several months of outpatient physical therapy and water therapy. Patient continues to have severe pain with attempted walking and standing. Patient is currently taking Tylenol and Lyrica for pain relief and rates her average daily pain level as a 5-2010, depending upon activity. Patient continues to be out of work secondary to back and leg pain. Patient states that her sleep quality of life has been severely affected as she is unable to walk or stand without severe pain. The patient denies saddle paraesthesias/ anaesthesia. Pain Assessment  2022   Location of Pain Back;Leg   Severity of Pain 5          ROS    The patient denies fevers, chills, chest pain, shortness of breath, nausea, vomiting. Positive for musculoskeletal issues as described in the HPI. Vitals:  Ht 5' 4\" (1.626 m)   Wt 260 lb (117.9 kg)   BMI 44.63 kg/m²    Body mass index is 44.63 kg/m². PHYSICAL EXAM:    The patient is alert and oriented x3 and in no acute distress. The patient is morbidly obese. Patient ambulates with right antalgic gait without gait aids. Sensory testing in all major nerve distributions in the lower extremities is intact and symmetric.  Manual motor testing of the major muscle groups of the lower extremities including dorsiflexion/EHL/ plantarflexion, knee flexion/extension, hip flexion/extension/abduction/ adduction is intact in the left lower extremity. Manual motor testing of the right lower extremity demonstrates right dorsiflexion/plantarflexion/EHL weakness 3/5, right knee flexion/extension weakness 4/5, right hip flexion/abduction weakness 3/5. Deep tendon reflexes +1/4 and symmetric in the bilateral knees and ankles. Hip range of motion is pain-free bilaterally. Negative straight leg raise bilaterally. No evidence of clonus bilaterally. Babinski's downgoing and symmetric bilaterally. Distal pulses intact and symmetric bilaterally. There is tenderness to palpation of the bilateral lower lumbar and sacral regions. IMAGING:    MRI Results (most recent):  Results from [place] encounter on 04/26/22    MRI LUMB SPINE WO CONT    Narrative  EXAM: MRI LUMB SPINE WO CONT    INDICATION: . Low back pain, unspecified    COMPARISON: 5/16/2021    TECHNIQUE: MR imaging of the lumbar spine was performed using the following  sequences: sagittal T1, T2, STIR;  axial T1, T2.    CONTRAST:  None. FINDINGS:    5 mm of anterolisthesis of L4 on L5 is unchanged Vertebral body heights are  maintained. Marrow signal is normal.    The conus medullaris terminates at L1. Signal and caliber of the distal spinal  cord are within normal limits. The paraspinal soft tissues are within normal limits. Lower thoracic spine: At T12-L1 there is a small central bulge of the disc with  canal lower limits of normal in size and slight narrowing of the left neural  foramen similar to the prior study. L1-L2: There is a small central bulge of the disc with a tiny inferior  protrusion in the midline similar to the prior study. There is no central  stenosis. L2-L3: No herniation or stenosis. L3-L4: No herniation or stenosis.  There is mild facet arthropathy    L4-L5: There is by mm of anterolisthesis of L4 and L5 with moderately severe  facet arthropathy with ligamentum hypertrophy with canal lower limits of normal  in size. Neural foramina are patent. L5-S1: No herniation or stenosis. There is moderately severe facet arthropathy    Impression  1. No significant change since 5/16/2021. There is 5 mm of anterolisthesis of L4  on L5 with moderately severe facet arthropathy at L4-L5 and L5-S1 levels without  evidence of central stenosis. 2. Small central bulge of the disc at T12-L1 is unchanged. Small central bulge  of the disc at L1-L2 with a tiny inferior protrusion in the midline is  unchanged. No Known Allergies      Current Outpatient Medications   Medication Sig    diclofenac EC (VOLTAREN) 75 mg EC tablet Take 1 Tablet by mouth two (2) times daily (with meals). Indications: PRN pain    oxybutynin chloride XL (DITROPAN XL) 5 mg CR tablet Take 1 Tablet by mouth in the morning. famotidine (PEPCID) 20 mg tablet Take 1 Tablet by mouth two (2) times a day. hydroCHLOROthiazide (HYDRODIURIL) 50 mg tablet TAKE 1 TABLET BY MOUTH EVERY DAY    irbesartan (AVAPRO) 300 mg tablet TAKE 1 TABLET BY MOUTH EVERY DAY    pregabalin (LYRICA) 150 mg capsule Take 1 Capsule by mouth four (4) times daily. Max Daily Amount: 600 mg. Indications: Neuropathic pain    estradioL (ESTRACE) 0.01 % (0.1 mg/gram) vaginal cream Insert  into vagina. polyethylene glycol (MIRALAX) 17 gram packet Take 1 Packet by mouth daily. Indications: constipation    naloxone (NARCAN) 4 mg/actuation nasal spray Use 1 spray intranasally, then discard. Repeat with new spray every 2 min as needed for opioid overdose symptoms, alternating nostrils. Indications: decrease in rate & depth of breathing due to opioid drug, opioid overdose    acetaminophen (TYLENOL) 650 mg TbER Take 650 mg by mouth every eight (8) hours. Indications: Pain associated with Arthritis     No current facility-administered medications for this visit.          Past Medical History:   Diagnosis Date Chronic constipation     Hypertension     Obesity, Class II, BMI 35-39.9     Pre-diabetes 2019    A1C- 6.1    PVC (premature ventricular contraction)     States never been to cardio- has had for many years          Past Surgical History:   Procedure Laterality Date    HX  SECTION N/A     x 3    HX COLONOSCOPY      HX KNEE REPLACEMENT Left 2019    HX TONSILLECTOMY      HX WISDOM TEETH EXTRACTION Bilateral          Family History   Problem Relation Age of Onset    Diabetes Mother     OSTEOARTHRITIS Father     Other Son         MULTIPLE ABSCESS TO HIS BRAIN     Anesth Problems Neg Hx           Social History     Tobacco Use    Smoking status: Never    Smokeless tobacco: Never   Vaping Use    Vaping Use: Never used   Substance Use Topics    Alcohol use: Never    Drug use: Never                 ASSESSMENT/PLAN:      1. Bulge of lumbar disc without myelopathy  -     REFERRAL TO SPINE INJECTION  2. Lumbar back pain  -     REFERRAL TO SPINE INJECTION  3. Lumbar radiculopathy  -     REFERRAL TO SPINE INJECTION  4. Spondylolisthesis of lumbar region  -     REFERRAL TO SPINE INJECTION  5. Lumbar spondylosis  -     REFERRAL TO SPINE INJECTION      Below is the assessment and plan developed based on review of pertinent history, physical exam, labs, studies, and medications. Have discussed the diagnosis and radiographic findings at length and have answered all patient questions to her satisfaction. Given the patient's ongoing pain despite conservative management have referred the patient for transforaminal epidural steroid injection. With failure to find lasting pain relief following 3 injections have asked the patient to return to our office for reevaluation. Patient will need to continue current work restrictions for the time being. Fully, patient will respond to injections. If not, then surgery would be her next alternative.   The patient understands and agrees to the treatment plan as outlined above.        Return if symptoms worsen or fail to improve. Dr. Robert Archer was available for immediate consult during this encounter. An electronic signature was used to authenticate this note.     -- SHELLY ZuñigaC

## 2022-08-04 NOTE — TELEPHONE ENCOUNTER
I left the patient a message to call me back at the office to discuss her lab results.    Signed By: Zola Schlatter, LPN     August 4, 8373

## 2022-08-05 ENCOUNTER — DOCUMENTATION ONLY (OUTPATIENT)
Dept: ORTHOPEDIC SURGERY | Age: 62
End: 2022-08-05

## 2022-08-05 NOTE — TELEPHONE ENCOUNTER
Two pt identifiers confirmed. I advised the patient per , her potassium is slightly low however patient is on Ditropan and cannot take potassium supplement due to drug drug interactiondiscussed increasing potassium intake in diet  Blood sugars now in early diabetic range patient can work hard on a low sugarlow carbohydrate diet high in protein and vegetables with   regular exercise to improve this Ozempic or metformin are also options  Normal blood count   Cholesterol is elevated with LDL at 128 goal is less than 100   If diabetes not reversed will need cholesterol medication  Repeat labs in November appointment\"    The patient does not want to take medicine at this time, she would like to exercise and change her diet. The patient wanted to know what food to eat, I explained to the patient she could go on line and look up a low fat and low sugar diet. I gave the patient examples of foods to eat and foods to avoid. While I was on the phone the patient stated, she gets heart palpations when she takes 2 Tylenol, never with 1 Tylenol. I suggested she only take 1 Tylenol and alternate with Naproxen sparingly. I told her I would let  know. Pt verbalized understanding of information discussed w/ no further questions at this time.   Signed By: Shaji Armenta LPN     August 5, 1137

## 2022-08-05 NOTE — PROGRESS NOTES
Last office note faxed to [facility] [phone]    Claim number [id number]

## 2022-08-25 ENCOUNTER — DOCUMENTATION ONLY (OUTPATIENT)
Dept: ORTHOPEDIC SURGERY | Age: 62
End: 2022-08-25

## 2022-08-25 NOTE — PROGRESS NOTES
Brett[employer] form and last office note faxed 8/24/22 to [phone]  Case number [id number]

## 2022-09-13 ENCOUNTER — DOCUMENTATION ONLY (OUTPATIENT)
Dept: ORTHOPEDIC SURGERY | Age: 62
End: 2022-09-13

## 2022-09-22 ENCOUNTER — TELEPHONE (OUTPATIENT)
Dept: INTERNAL MEDICINE CLINIC | Age: 62
End: 2022-09-22

## 2022-09-22 DIAGNOSIS — I10 ESSENTIAL HYPERTENSION: ICD-10-CM

## 2022-09-22 DIAGNOSIS — E11.9 CONTROLLED TYPE 2 DIABETES MELLITUS WITHOUT COMPLICATION, WITHOUT LONG-TERM CURRENT USE OF INSULIN (HCC): Primary | ICD-10-CM

## 2022-09-22 RX ORDER — VERAPAMIL HYDROCHLORIDE 240 MG/1
240 TABLET, FILM COATED, EXTENDED RELEASE ORAL DAILY
Qty: 90 TABLET | Refills: 1 | Status: SHIPPED | OUTPATIENT
Start: 2022-09-22

## 2022-09-22 RX ORDER — SEMAGLUTIDE 1.34 MG/ML
0.25 INJECTION, SOLUTION SUBCUTANEOUS
Qty: 1 BOX | Refills: 0 | Status: SHIPPED | OUTPATIENT
Start: 2022-09-22

## 2022-09-22 RX ORDER — VERAPAMIL HYDROCHLORIDE 240 MG/1
TABLET, FILM COATED, EXTENDED RELEASE ORAL
Qty: 90 TABLET | Refills: 1 | Status: SHIPPED | OUTPATIENT
Start: 2022-09-22

## 2022-09-22 RX ORDER — SEMAGLUTIDE 1.34 MG/ML
0.25 INJECTION, SOLUTION SUBCUTANEOUS
Qty: 1 BOX | Refills: 0 | Status: SHIPPED | OUTPATIENT
Start: 2022-09-22 | End: 2022-09-22 | Stop reason: SDUPTHER

## 2022-09-22 NOTE — TELEPHONE ENCOUNTER
Future Appointments:  Future Appointments   Date Time Provider Faby Tirado   10/27/2022 11:20 AM Dakota Olvera BS AMB   11/1/2022 11:15 AM Keila Sheehan MD [facility] BS AMB        Last Appointment With Me:  7/28/2022     Requested Prescriptions     Pending Prescriptions Disp Refills    verapamil ER (CALAN-SR) 240 mg CR tablet 90 Tablet 1     Sig: Take 1 Tablet by mouth daily.  TAKE 1 TABLET BY MOUTH EVERY DAY EVERY NIGHT

## 2022-09-22 NOTE — TELEPHONE ENCOUNTER
Patient wants to start ozempic   For diabetes   Orders Placed This Encounter    semaglutide (Ozempic) 0.25 mg or 0.5 mg/dose (2 mg/1.5 ml) subq pen     Si.25 mg by SubCUTAneous route every seven (7) days.      Dispense:  1 Box     Refill:  0

## 2022-09-22 NOTE — TELEPHONE ENCOUNTER
Caller requests Refill of:  verapamil ER (CALAN-SR) 240 mg CR tablet      Please send to:  [facility]/pharmacy #[id number], [address] [facility]  [phone]      Visit Appointment History:   Future:   11/1/22  Previous: 7/28/22      Caller confirmed instructions and dosages as correct. Caller was advised that Meds will be refilled as soon as possible, however there can be a 48-72 business hour turn around on refill requests.

## 2022-09-23 ENCOUNTER — TELEPHONE (OUTPATIENT)
Dept: INTERNAL MEDICINE CLINIC | Age: 62
End: 2022-09-23

## 2022-09-26 ENCOUNTER — TELEPHONE (OUTPATIENT)
Dept: INTERNAL MEDICINE CLINIC | Age: 62
End: 2022-09-26

## 2022-09-26 NOTE — TELEPHONE ENCOUNTER
Patient states she needs a call back in reference to getting her Handicap Parking Permit Extended/Renewed for 6 more Months. Patient also states she needs to discuss Medication Prescribed, semaglutide (Ozempic) 0.25 mg or 0.5 mg/dose (2 mg/1.5 ml) subq pen is requiring a Prior Auth thru insurance or if there is any kind of Program for her to receive Patient Assistance on this Medication. Please call to discuss & advise on All.  Thank you

## 2022-09-26 NOTE — TELEPHONE ENCOUNTER
Started PA for patients Ozempic (0.25 or 0.5 MG/DOSE). Filled out the [facility] form for patient. Waiting on  To sign the form.

## 2022-09-28 ENCOUNTER — DOCUMENTATION ONLY (OUTPATIENT)
Dept: ORTHOPEDIC SURGERY | Age: 62
End: 2022-09-28

## 2022-11-03 DIAGNOSIS — I10 ESSENTIAL HYPERTENSION: ICD-10-CM

## 2022-11-03 RX ORDER — HYDROCHLOROTHIAZIDE 50 MG/1
50 TABLET ORAL DAILY
Qty: 90 TABLET | Refills: 1 | Status: SHIPPED | OUTPATIENT
Start: 2022-11-03

## 2022-11-03 NOTE — TELEPHONE ENCOUNTER
PCP: Mino Dowd MD    Last appt: 11/1/2022  No future appointments. Requested Prescriptions     Pending Prescriptions Disp Refills    hydroCHLOROthiazide (HYDRODIURIL) 50 mg tablet 90 Tablet 1     Sig: Take 1 Tablet by mouth daily.

## 2022-11-03 NOTE — TELEPHONE ENCOUNTER
----- Message from Anand Soto sent at 11/3/2022  8:59 AM EDT -----  Subject: Refill Request    QUESTIONS  Name of Medication? hydroCHLOROthiazide (HYDRODIURIL) 50 mg tablet  Patient-reported dosage and instructions? once daily  How many days do you have left? 0  Preferred Pharmacy? [facility] [id number]  Pharmacy phone number (if available)? [phone]  ---------------------------------------------------------------------------  --------------  Samy MCCLELLAN  What is the best way for the office to contact you? OK to leave message on   voicemail  Preferred Call Back Phone Number? [phone]  ---------------------------------------------------------------------------  --------------  SCRIPT ANSWERS  Relationship to Patient?  Self

## 2022-12-11 DIAGNOSIS — E78.00 HIGH CHOLESTEROL: ICD-10-CM

## 2022-12-11 RX ORDER — IRBESARTAN 300 MG/1
TABLET ORAL
Qty: 90 TABLET | Refills: 0 | Status: SHIPPED | OUTPATIENT
Start: 2022-12-11

## 2023-01-06 DIAGNOSIS — M54.16 LUMBAR RADICULOPATHY: ICD-10-CM

## 2023-01-06 RX ORDER — PREGABALIN 150 MG/1
150 CAPSULE ORAL 4 TIMES DAILY
Qty: 120 CAPSULE | Refills: 2 | Status: SHIPPED | OUTPATIENT
Start: 2023-01-06

## 2023-01-17 DIAGNOSIS — E78.00 HIGH CHOLESTEROL: ICD-10-CM

## 2023-01-17 DIAGNOSIS — M54.16 LUMBAR RADICULOPATHY: ICD-10-CM

## 2023-01-17 RX ORDER — PREGABALIN 150 MG/1
150 CAPSULE ORAL 4 TIMES DAILY
Qty: 120 CAPSULE | Refills: 2 | Status: SHIPPED | OUTPATIENT
Start: 2023-01-17

## 2023-01-17 RX ORDER — IRBESARTAN 300 MG/1
300 TABLET ORAL DAILY
Qty: 90 TABLET | Refills: 0 | Status: SHIPPED | OUTPATIENT
Start: 2023-01-17

## 2023-01-17 NOTE — TELEPHONE ENCOUNTER
Future Appointments:  No future appointments. Last Appointment With Me:  11/1/2022     Requested Prescriptions     Pending Prescriptions Disp Refills    irbesartan (AVAPRO) 300 mg tablet 90 Tablet 0     Sig: Take 1 Tablet by mouth daily. pregabalin (LYRICA) 150 mg capsule 120 Capsule 2     Sig: Take 1 Capsule by mouth four (4) times daily. Max Daily Amount: 600 mg.  Indications: Neuropathic pain

## 2023-01-17 NOTE — TELEPHONE ENCOUNTER
----- Message from [facility].  Sandrine Smith sent at 1/16/2023  4:48 PM EST -----  Regarding: Order Prescription   Please I need reorder my prescription     Irbesartan tabletsUSP 300 mg,, blood pressure     Pregabalin 150mg capsule, take 1 capsule by mouth 4 times a day for Back spine pain    [facility] [phone]  [address], March 23, 1960  [phone]

## 2023-02-28 ENCOUNTER — TELEPHONE (OUTPATIENT)
Dept: INTERNAL MEDICINE CLINIC | Age: 63
End: 2023-02-28

## 2023-02-28 ENCOUNTER — VIRTUAL VISIT (OUTPATIENT)
Dept: INTERNAL MEDICINE CLINIC | Age: 63
End: 2023-02-28
Payer: COMMERCIAL

## 2023-02-28 DIAGNOSIS — H60.502 ACUTE OTITIS EXTERNA OF LEFT EAR, UNSPECIFIED TYPE: Primary | ICD-10-CM

## 2023-02-28 PROCEDURE — 99213 OFFICE O/P EST LOW 20 MIN: CPT | Performed by: FAMILY MEDICINE

## 2023-02-28 RX ORDER — AZITHROMYCIN 250 MG/1
250 TABLET, FILM COATED ORAL SEE ADMIN INSTRUCTIONS
Qty: 6 TABLET | Refills: 0 | Status: SHIPPED | OUTPATIENT
Start: 2023-02-28 | End: 2023-03-05

## 2023-02-28 NOTE — PROGRESS NOTES
1. \"Have you been to the ER, urgent care clinic since your last visit? Hospitalized since your last visit? \" No    2. \"Have you seen or consulted any other health care providers outside of the [address] [place] [address] since your last visit? \" No     3. For patients aged 39-70: Has the patient had a colonoscopy / FIT/ Cologuard? Yes - no Care Gap present      If the patient is female:    4. For patients aged 41-77: Has the patient had a mammogram within the past 2 years? No      5. For patients aged 21-65: Has the patient had a pap smear?  No

## 2023-02-28 NOTE — PROGRESS NOTES
Yolis Bolivar is a 63 y.o. female patient of Dr. Jacinto Alexander who presents with concern of left ear pain. Onset one week, getting worse. No ear discharge. Has nasal congestion. No chest congestion. No fever. This is an established visit conducted via telemedicine with video. The patient has been instructed that this meets HIPAA criteria and acknowledges and agrees to this method of visitation. Pursuant to the emergency declaration under the [address] [place] [address] authority and the [employer] Act, this Virtual Visit was conducted, with patient's consent, to reduce the patient's risk of exposure to COVID-19 and provide continuity of care for an established patient. Services were provided through a video synchronous discussion virtually to substitute for in-person clinic visit.         Past Medical History:   Diagnosis Date    Chronic constipation     Hypertension     Obesity, Class II, BMI 35-39.9     Pre-diabetes 03/28/2019    A1C- 6.1    PVC (premature ventricular contraction)     States never been to cardio- has had for many years       Family History   Problem Relation Age of Onset    Diabetes Mother     OSTEOARTHRITIS Father     Other Son         MULTIPLE ABSCESS TO HIS BRAIN     Anesth Problems Neg Hx        Social History     Socioeconomic History    Marital status:      Spouse name: Not on file    Number of children: Not on file    Years of education: Not on file    Highest education level: Not on file   Occupational History    Not on file   Tobacco Use    Smoking status: Never    Smokeless tobacco: Never   Vaping Use    Vaping Use: Never used   Substance and Sexual Activity    Alcohol use: Never    Drug use: Never    Sexual activity: Yes   Other Topics Concern    Not on file   Social History Narrative    Not on file     Social Determinants of Health     Financial Resource Strain: High Risk    Difficulty of Paying Living Expenses: Very hard   Food Insecurity: Food Insecurity Present    Worried About [facility] in the Last Year: Never true    Ran Out of Food in the Last Year: Sometimes true   Transportation Needs: No Transportation Needs    Lack of Transportation (Medical): No    Lack of Transportation (Non-Medical): No   Physical Activity: Not on file   Stress: Not on file   Social Connections: Not on file   Intimate Partner Violence: Not on file   Housing Stability: High Risk    Unable to Pay for Housing in the Last Year: Yes    Number of Places Lived in the Last Year: Not on file    Unstable Housing in the Last Year: No       Current Outpatient Medications on File Prior to Visit   Medication Sig Dispense Refill    irbesartan (AVAPRO) 300 mg tablet Take 1 Tablet by mouth daily. 90 Tablet 0    pregabalin (LYRICA) 150 mg capsule Take 1 Capsule by mouth four (4) times daily. Max Daily Amount: 600 mg. Indications: Neuropathic pain 120 Capsule 2    hydroCHLOROthiazide (HYDRODIURIL) 50 mg tablet Take 1 Tablet by mouth daily. 90 Tablet 1    verapamil ER (CALAN-SR) 240 mg CR tablet Take 1 Tablet by mouth daily. TAKE 1 TABLET BY MOUTH EVERY DAY EVERY NIGHT 90 Tablet 1    verapamil ER (CALAN-SR) 240 mg CR tablet TAKE 1 TABLET BY MOUTH AT NIGHT 90 Tablet 1    semaglutide (Ozempic) 0.25 mg or 0.5 mg/dose (2 mg/1.5 ml) subq pen 0.25 mg by SubCUTAneous route every seven (7) days. 1 Box 0    diclofenac EC (VOLTAREN) 75 mg EC tablet Take 1 Tablet by mouth two (2) times daily (with meals). Indications: PRN pain 60 Tablet 1    oxybutynin chloride XL (DITROPAN XL) 5 mg CR tablet Take 1 Tablet by mouth in the morning. 90 Tablet 1    famotidine (PEPCID) 20 mg tablet Take 1 Tablet by mouth two (2) times a day. 60 Tablet 2    estradioL (ESTRACE) 0.01 % (0.1 mg/gram) vaginal cream Insert  into vagina. polyethylene glycol (MIRALAX) 17 gram packet Take 1 Packet by mouth daily.  Indications: constipation 21 Packet 0    naloxone [facility]) 4 mg/actuation nasal spray Use 1 spray intranasally, then discard. Repeat with new spray every 2 min as needed for opioid overdose symptoms, alternating nostrils. Indications: decrease in rate & depth of breathing due to opioid drug, opioid overdose 1 Each 0    acetaminophen (TYLENOL) 650 mg TbER Take 650 mg by mouth every eight (8) hours. Indications: Pain associated with Arthritis       No current facility-administered medications on file prior to visit. Review of Systems  Pertinent items are noted in HPI. Objective:     Gen: mildly ill appearing female  HEENT: normal conjunctiva, audible congestion, patient does not see oral erythema, has MMM, reports left ear pain, no ear discharge or external ear pain  Neck: patient does not feel enlarged or tender LAD or masses  Resp: normal respiratory effort, no audible wheezing. CV: patient does not feel palpitations or heart irregularity      Assessment/Plan:       ICD-10-CM ICD-9-CM    1. Acute otitis externa of left ear, unspecified type  H60.502 380.10 azithromycin (ZITHROMAX) 250 mg tablet           Start flonase. Start antibiotic today. This was a telemedicine visit with video.         Geo Henao MD

## 2023-02-28 NOTE — TELEPHONE ENCOUNTER
Patient states Ozempic is too expensive and requesting an alternative.   She also states she is due for renewal of her handicap permit

## 2023-03-02 ENCOUNTER — TELEPHONE (OUTPATIENT)
Dept: INTERNAL MEDICINE CLINIC | Age: 63
End: 2023-03-02

## 2023-03-02 DIAGNOSIS — E11.9 CONTROLLED TYPE 2 DIABETES MELLITUS WITHOUT COMPLICATION, WITHOUT LONG-TERM CURRENT USE OF INSULIN (HCC): Primary | ICD-10-CM

## 2023-03-02 NOTE — TELEPHONE ENCOUNTER
----- Message from Sebastián Fernandez sent at 3/2/2023  2:45 PM EST -----  Order for A1C too please    She is going to come in Monday for both.

## 2023-03-02 NOTE — TELEPHONE ENCOUNTER
----- Message from Sanchez Stapleton sent at 3/2/2023  2:45 PM EST -----  Order for A1C too please    She is going to come in Monday for both.

## 2023-03-02 NOTE — TELEPHONE ENCOUNTER
----- Message from [facility] sent at 3/2/2023  1:34 PM EST -----  Subject: Appointment Request    Reason for Call: Established Patient Appointment needed: Routine Existing   Condition Follow Up (Diabetes)    QUESTIONS    Reason for appointment request? Available appointments did not meet   patient need     Additional Information for Provider? Patient had to cancel her 03/02   appointment due to work. She would like to reschedule an appointment   before 03/09 because her handicap tag expires. When trying to schedule   next appointment shows 04/10 but patient cannot wait that long.  She would   like to be scheduled for next Monday or Tuesday if possible.   ---------------------------------------------------------------------------  --------------  Arnie MCCLELLAN  [phone]; OK to leave message on voicemail  ---------------------------------------------------------------------------  --------------  SCRIPT ANSWERS  COVID Screen: Haritha Lira

## 2023-03-03 ENCOUNTER — LAB ONLY (OUTPATIENT)
Dept: INTERNAL MEDICINE CLINIC | Age: 63
End: 2023-03-03

## 2023-03-03 DIAGNOSIS — E11.9 CONTROLLED TYPE 2 DIABETES MELLITUS WITHOUT COMPLICATION, WITHOUT LONG-TERM CURRENT USE OF INSULIN (HCC): ICD-10-CM

## 2023-03-04 LAB
EST. AVERAGE GLUCOSE BLD GHB EST-MCNC: 131 MG/DL
HBA1C MFR BLD: 6.2 % (ref 4–5.6)

## 2023-03-12 DIAGNOSIS — E78.00 HIGH CHOLESTEROL: ICD-10-CM

## 2023-03-12 RX ORDER — IRBESARTAN 300 MG/1
TABLET ORAL
Qty: 90 TABLET | Refills: 0 | Status: SHIPPED | OUTPATIENT
Start: 2023-03-12

## 2023-03-15 ENCOUNTER — TELEPHONE (OUTPATIENT)
Dept: INTERNAL MEDICINE CLINIC | Age: 63
End: 2023-03-15

## 2023-03-15 NOTE — TELEPHONE ENCOUNTER
----- Message from Hazle Prader sent at 3/15/2023  1:35 PM EDT -----  Subject: Message to Provider    QUESTIONS  Information for Provider? Patient is calling to see if she could try the   follow weight management drug [employer] her daughter was telling her about   she uses jeanmarie Garcia   ---------------------------------------------------------------------------  --------------  [id number]  [phone]; OK to leave message on voicemail  ---------------------------------------------------------------------------  --------------  SCRIPT ANSWERS  Relationship to Patient?  Self

## 2023-03-30 ENCOUNTER — OFFICE VISIT (OUTPATIENT)
Dept: INTERNAL MEDICINE CLINIC | Age: 63
End: 2023-03-30
Payer: COMMERCIAL

## 2023-03-30 VITALS
HEART RATE: 71 BPM | TEMPERATURE: 98.2 F | HEIGHT: 64 IN | WEIGHT: 277 LBS | RESPIRATION RATE: 16 BRPM | SYSTOLIC BLOOD PRESSURE: 125 MMHG | BODY MASS INDEX: 47.29 KG/M2 | DIASTOLIC BLOOD PRESSURE: 67 MMHG | OXYGEN SATURATION: 97 %

## 2023-03-30 DIAGNOSIS — M54.16 LUMBAR RADICULOPATHY: ICD-10-CM

## 2023-03-30 DIAGNOSIS — I10 ESSENTIAL HYPERTENSION: ICD-10-CM

## 2023-03-30 DIAGNOSIS — E66.01 MORBID OBESITY (HCC): ICD-10-CM

## 2023-03-30 DIAGNOSIS — E11.65 CONTROLLED TYPE 2 DIABETES MELLITUS WITH HYPERGLYCEMIA, WITHOUT LONG-TERM CURRENT USE OF INSULIN (HCC): Primary | ICD-10-CM

## 2023-03-30 DIAGNOSIS — E78.00 HIGH CHOLESTEROL: ICD-10-CM

## 2023-03-30 PROCEDURE — 99214 OFFICE O/P EST MOD 30 MIN: CPT | Performed by: INTERNAL MEDICINE

## 2023-03-30 RX ORDER — METFORMIN HYDROCHLORIDE 500 MG/1
500 TABLET, EXTENDED RELEASE ORAL
Qty: 30 TABLET | Refills: 1 | Status: SHIPPED | OUTPATIENT
Start: 2023-03-30

## 2023-03-30 NOTE — PROGRESS NOTES
Ms. Patricia Adams is presenting to follow up     CC:  Weight Loss and Diabetes       HPI:    Patient has obesity and diabetes and she has diabetes HA1C 6.5   She is struggling with back pain and limping due to obesity. She would like to be on moujaro for weight loss or ozempic    She has been struggling with eating  She \" loves fat\"     She has a hx of HTN: she is currently on verapamil and avapro and HCTZ with good control           Review of systems:  Constitutional: negative for fever, chills, weight loss, night sweats   Eyes : negative for vision changes, eye pain and discharge  Nose and Throat: negative for tinnitus, sore throat   Cardiovascular: negative for chest pain, palpitations and shortness of breath  Respiratory: negative for shortness of breath, cough and wheezing   Gastroinstestinal: negative for abdominal pain, nausea, vomiting, diarrhea, constipation, and blood in the stool  Musculoskeletal: having back pain and knee pain   Genitourinary: negative for dysuria, nocturia, polyuria and hematuria   Neurologic: Negative for focal weakness, numbness or incoordination  Skin: negative for rash, pruritus  Hematologic: negative for easy bruising      Past Medical History:   Diagnosis Date    Chronic constipation     Hypertension     Obesity, Class II, BMI 35-39.9     Pre-diabetes 2019    A1C- 6.1    PVC (premature ventricular contraction)     States never been to cardio- has had for many years        Past Surgical History:   Procedure Laterality Date    HX  SECTION N/A     x 3    HX COLONOSCOPY      HX KNEE REPLACEMENT Left 2019    HX TONSILLECTOMY      HX WISDOM TEETH EXTRACTION Bilateral        No Known Allergies    Current Outpatient Medications on File Prior to Visit   Medication Sig Dispense Refill    irbesartan (AVAPRO) 300 mg tablet TAKE 1 TABLET BY MOUTH EVERY DAY 90 Tablet 0    pregabalin (LYRICA) 150 mg capsule Take 1 Capsule by mouth four (4) times daily. Max Daily Amount: 600 mg. Indications: Neuropathic pain 120 Capsule 2    hydroCHLOROthiazide (HYDRODIURIL) 50 mg tablet Take 1 Tablet by mouth daily. 90 Tablet 1    verapamil ER (CALAN-SR) 240 mg CR tablet Take 1 Tablet by mouth daily. TAKE 1 TABLET BY MOUTH EVERY DAY EVERY NIGHT 90 Tablet 1    verapamil ER (CALAN-SR) 240 mg CR tablet TAKE 1 TABLET BY MOUTH AT NIGHT 90 Tablet 1    semaglutide (Ozempic) 0.25 mg or 0.5 mg/dose (2 mg/1.5 ml) subq pen 0.25 mg by SubCUTAneous route every seven (7) days. 1 Box 0    diclofenac EC (VOLTAREN) 75 mg EC tablet Take 1 Tablet by mouth two (2) times daily (with meals). Indications: PRN pain 60 Tablet 1    oxybutynin chloride XL (DITROPAN XL) 5 mg CR tablet Take 1 Tablet by mouth in the morning. 90 Tablet 1    famotidine (PEPCID) 20 mg tablet Take 1 Tablet by mouth two (2) times a day. 60 Tablet 2    estradioL (ESTRACE) 0.01 % (0.1 mg/gram) vaginal cream Insert  into vagina. polyethylene glycol (MIRALAX) 17 gram packet Take 1 Packet by mouth daily. Indications: constipation 21 Packet 0    naloxone (NARCAN) 4 mg/actuation nasal spray Use 1 spray intranasally, then discard. Repeat with new spray every 2 min as needed for opioid overdose symptoms, alternating nostrils. Indications: decrease in rate & depth of breathing due to opioid drug, opioid overdose 1 Each 0    acetaminophen (TYLENOL) 650 mg TbER Take 650 mg by mouth every eight (8) hours. Indications: Pain associated with Arthritis       No current facility-administered medications on file prior to visit. family history includes Diabetes in her mother; OSTEOARTHRITIS in her father; Other in her son.     Social History     Socioeconomic History    Marital status:      Spouse name: Not on file    Number of children: Not on file    Years of education: Not on file    Highest education level: Not on file   Occupational History    Not on file   Tobacco Use    Smoking status: Never    Smokeless tobacco: Never   Vaping Use    Vaping Use: Never used   Substance and Sexual Activity    Alcohol use: Never    Drug use: Never    Sexual activity: Yes   Other Topics Concern    Not on file   Social History Narrative    Not on file     Social Determinants of Health     Financial Resource Strain: High Risk    Difficulty of Paying Living Expenses: Very hard   Food Insecurity: Food Insecurity Present    Worried About [employer] in the Last Year: Never true    Ran Out of Food in the Last Year: Sometimes true   Transportation Needs: No Transportation Needs    Lack of Transportation (Medical): No    Lack of Transportation (Non-Medical): No   Physical Activity: Not on file   Stress: Not on file   Social Connections: Not on file   Intimate Partner Violence: Not on file   Housing Stability: High Risk    Unable to Pay for Housing in the Last Year: Yes    Number of Places Lived in the Last Year: Not on file    Unstable Housing in the Last Year: No       Visit Vitals  /67 (BP 1 Location: Left arm, BP Patient Position: Sitting)   Pulse 71   Temp 98.2 °F (36.8 °C) (Temporal)   Resp 16   Ht 5' 4\" (1.626 m)   Wt 277 lb (125.6 kg)   SpO2 97%   BMI 47.55 kg/m²     General:  Well appearing female no acute distress  HEENT:   PERRL,normal conjunctiva. External ear and canals normal, TMs normal.  Hearing normal to voice. Nose without edema or discharge, normal septum. Lips, teeth, gums normal.  Oropharynx: no erythema, no exudates, no lesions, normal tongue. Neck:  Supple. Thyroid normal size, nontender, without nodules. No carotid bruit. No masses or lymphadenopathy  Respiratory: no respiratory distress,  no wheezing, no rhonchi, no rales. No chest wall tenderness. Cardiovascular:  RRR, normal S1S2, no murmur. Gastrointestinal: normal bowel sounds, soft, nontender, without masses. No hepatosplenomegaly. Extremities +2 pulses, no edema, normal sensation   Musculoskeletal:  Normal gait. Normal digits and nails.   Normal strength and tone, no atrophy, and no abnormal movement. Skin:  No rash, no lesions, no ulcers. Skin warm, normal turgor, without induration or nodules. Neuro:  A and OX4, fluent speech, cranial nerves normal 2-12. Psych:  Normal affect      Lab Results   Component Value Date/Time    WBC 5.0 07/28/2022 10:55 AM    HGB 13.0 07/28/2022 10:55 AM    HCT 41.5 07/28/2022 10:55 AM    PLATELET 187 46/41/2884 10:55 AM    MCV 89.2 07/28/2022 10:55 AM     Lab Results   Component Value Date/Time    Sodium 139 07/28/2022 10:55 AM    Potassium 3.3 (L) 07/28/2022 10:55 AM    Chloride 103 07/28/2022 10:55 AM    CO2 28 07/28/2022 10:55 AM    Anion gap 8 07/28/2022 10:55 AM    Glucose 121 (H) 07/28/2022 10:55 AM    BUN 18 07/28/2022 10:55 AM    Creatinine 0.84 07/28/2022 10:55 AM    BUN/Creatinine ratio 21 (H) 07/28/2022 10:55 AM    GFR est AA >60 07/28/2022 10:55 AM    GFR est non-AA >60 07/28/2022 10:55 AM    Calcium 10.0 07/28/2022 10:55 AM     Lab Results   Component Value Date/Time    Cholesterol, total 203 (H) 07/28/2022 10:55 AM    HDL Cholesterol 54 07/28/2022 10:55 AM    LDL, calculated 128.2 (H) 07/28/2022 10:55 AM    VLDL, calculated 20.8 07/28/2022 10:55 AM    Triglyceride 104 07/28/2022 10:55 AM    CHOL/HDL Ratio 3.8 07/28/2022 10:55 AM     Lab Results   Component Value Date/Time    TSH 2.60 07/28/2022 10:55 AM     Lab Results   Component Value Date/Time    Hemoglobin A1c 6.2 (H) 03/03/2023 02:13 PM     No results found for: [place]ba [place], XQVID3, XQVID, VD3RIA                Assessment and Plan:   1. Controlled type 2 diabetes mellitus with hyperglycemia, without long-term current use of insulin (HCC)  - metFORMIN ER (GLUCOPHAGE XR) 500 mg tablet; Take 1 Tablet by mouth daily (with dinner). Dispense: 30 Tablet; Refill: 1  - REFERRAL TO DIETITIAN    2. High cholesterol  Diet recommendations , early diabetes and hoping to reverse it with weight loss     3. Lumbar radiculopathy  Encouraged to stretch and weight loss     4.  Essential hypertension: verapamil, HCTZ, avapro   Controlled on current regimen    5.  Morbid obesity ([address]  Desires to have weight loss, discussed eating healthy  - REFERRAL TO DIETITIAN      Follow up in 3 months     Laron Castro MD

## 2023-03-30 NOTE — PROGRESS NOTES
1. \"Have you been to the ER, urgent care clinic since your last visit? Hospitalized since your last visit? \" No    2. \"Have you seen or consulted any other health care providers outside of the [address] [place] [address] since your last visit? \" No     3. For patients aged 39-70: Has the patient had a colonoscopy / FIT/ Cologuard? Yes - Care Gap present. Most recent result on file      If the patient is female:    4. For patients aged 41-77: Has the patient had a mammogram within the past 2 years? Yes - Care Gap present. Most recent result on file      5. For patients aged 21-65: Has the patient had a pap smear?  No

## 2023-03-31 LAB
ALBUMIN SERPL-MCNC: 4.4 G/DL (ref 3.5–5)
ALBUMIN/GLOB SERPL: 1.4 (ref 1.1–2.2)
ALP SERPL-CCNC: 65 U/L (ref 45–117)
ALT SERPL-CCNC: 27 U/L (ref 12–78)
ANION GAP SERPL CALC-SCNC: 4 MMOL/L (ref 5–15)
AST SERPL-CCNC: 18 U/L (ref 15–37)
BASOPHILS # BLD: 0.1 K/UL (ref 0–0.1)
BASOPHILS NFR BLD: 1 % (ref 0–1)
BILIRUB SERPL-MCNC: 0.4 MG/DL (ref 0.2–1)
BUN SERPL-MCNC: 26 MG/DL (ref 6–20)
BUN/CREAT SERPL: 31 (ref 12–20)
CALCIUM SERPL-MCNC: 10.4 MG/DL (ref 8.5–10.1)
CHLORIDE SERPL-SCNC: 102 MMOL/L (ref 97–108)
CO2 SERPL-SCNC: 33 MMOL/L (ref 21–32)
CREAT SERPL-MCNC: 0.85 MG/DL (ref 0.55–1.02)
CREAT UR-MCNC: 94.9 MG/DL
DIFFERENTIAL METHOD BLD: ABNORMAL
EOSINOPHIL # BLD: 0.2 K/UL (ref 0–0.4)
EOSINOPHIL NFR BLD: 3 % (ref 0–7)
ERYTHROCYTE [DISTWIDTH] IN BLOOD BY AUTOMATED COUNT: 14.9 % (ref 11.5–14.5)
GLOBULIN SER CALC-MCNC: 3.1 G/DL (ref 2–4)
GLUCOSE SERPL-MCNC: 97 MG/DL (ref 65–100)
HCT VFR BLD AUTO: 39.7 % (ref 35–47)
HGB BLD-MCNC: 12.7 G/DL (ref 11.5–16)
IMM GRANULOCYTES # BLD AUTO: 0 K/UL (ref 0–0.04)
IMM GRANULOCYTES NFR BLD AUTO: 0 % (ref 0–0.5)
LYMPHOCYTES # BLD: 2.8 K/UL (ref 0.8–3.5)
LYMPHOCYTES NFR BLD: 54 % (ref 12–49)
MCH RBC QN AUTO: 28.4 PG (ref 26–34)
MCHC RBC AUTO-ENTMCNC: 32 G/DL (ref 30–36.5)
MCV RBC AUTO: 88.8 FL (ref 80–99)
MICROALBUMIN UR-MCNC: 0.83 MG/DL
MICROALBUMIN/CREAT UR-RTO: 9 MG/G (ref 0–30)
MONOCYTES # BLD: 0.4 K/UL (ref 0–1)
MONOCYTES NFR BLD: 9 % (ref 5–13)
NEUTS SEG # BLD: 1.7 K/UL (ref 1.8–8)
NEUTS SEG NFR BLD: 33 % (ref 32–75)
NRBC # BLD: 0 K/UL (ref 0–0.01)
NRBC BLD-RTO: 0 PER 100 WBC
PLATELET # BLD AUTO: 269 K/UL (ref 150–400)
PMV BLD AUTO: 11 FL (ref 8.9–12.9)
POTASSIUM SERPL-SCNC: 3.8 MMOL/L (ref 3.5–5.1)
PROT SERPL-MCNC: 7.5 G/DL (ref 6.4–8.2)
RBC # BLD AUTO: 4.47 M/UL (ref 3.8–5.2)
SODIUM SERPL-SCNC: 139 MMOL/L (ref 136–145)
WBC # BLD AUTO: 5.2 K/UL (ref 3.6–11)

## 2023-04-21 ENCOUNTER — PATIENT MESSAGE (OUTPATIENT)
Dept: INTERNAL MEDICINE CLINIC | Age: 63
End: 2023-04-21

## 2023-04-21 DIAGNOSIS — E11.65 CONTROLLED TYPE 2 DIABETES MELLITUS WITH HYPERGLYCEMIA, WITHOUT LONG-TERM CURRENT USE OF INSULIN (HCC): Primary | ICD-10-CM

## 2023-04-25 NOTE — TELEPHONE ENCOUNTER
Nathaly Singh 4/24/2023 7:41 AM EDT      ----- Message -----  From: Arnulfo Orellana \"Latia\"  Sent: 4/22/2023 10:08 AM EDT  To: Madhav Armstrong  Subject: Medicine     Olivia Flatten so what about Emily Levar can you call in a prescription for that

## 2023-05-27 DIAGNOSIS — I10 ESSENTIAL (PRIMARY) HYPERTENSION: ICD-10-CM

## 2023-05-30 RX ORDER — HYDROCHLOROTHIAZIDE 50 MG/1
TABLET ORAL
Qty: 90 TABLET | Refills: 1 | Status: SHIPPED | OUTPATIENT
Start: 2023-05-30

## 2023-06-09 DIAGNOSIS — E78.00 PURE HYPERCHOLESTEROLEMIA, UNSPECIFIED: ICD-10-CM

## 2023-06-09 DIAGNOSIS — I10 ESSENTIAL (PRIMARY) HYPERTENSION: ICD-10-CM

## 2023-06-09 RX ORDER — VERAPAMIL HYDROCHLORIDE 240 MG/1
TABLET, FILM COATED, EXTENDED RELEASE ORAL
Qty: 90 TABLET | Refills: 1 | Status: SHIPPED | OUTPATIENT
Start: 2023-06-09

## 2023-06-09 RX ORDER — IRBESARTAN 300 MG/1
TABLET ORAL
Qty: 90 TABLET | Refills: 0 | Status: SHIPPED | OUTPATIENT
Start: 2023-06-09

## 2023-06-26 ENCOUNTER — TELEPHONE (OUTPATIENT)
Age: 63
End: 2023-06-26

## 2023-06-28 ENCOUNTER — TELEPHONE (OUTPATIENT)
Age: 63
End: 2023-06-28

## 2023-07-11 ENCOUNTER — OFFICE VISIT (OUTPATIENT)
Age: 63
End: 2023-07-11
Payer: COMMERCIAL

## 2023-07-11 ENCOUNTER — TELEPHONE (OUTPATIENT)
Age: 63
End: 2023-07-11

## 2023-07-11 VITALS
RESPIRATION RATE: 16 BRPM | HEART RATE: 66 BPM | WEIGHT: 248 LBS | TEMPERATURE: 97 F | DIASTOLIC BLOOD PRESSURE: 66 MMHG | BODY MASS INDEX: 42.34 KG/M2 | OXYGEN SATURATION: 97 % | HEIGHT: 64 IN | SYSTOLIC BLOOD PRESSURE: 114 MMHG

## 2023-07-11 DIAGNOSIS — E78.00 PURE HYPERCHOLESTEROLEMIA, UNSPECIFIED: ICD-10-CM

## 2023-07-11 DIAGNOSIS — G89.29 CHRONIC BILATERAL LOW BACK PAIN WITH BILATERAL SCIATICA: ICD-10-CM

## 2023-07-11 DIAGNOSIS — M54.42 CHRONIC BILATERAL LOW BACK PAIN WITH BILATERAL SCIATICA: ICD-10-CM

## 2023-07-11 DIAGNOSIS — I10 ESSENTIAL (PRIMARY) HYPERTENSION: ICD-10-CM

## 2023-07-11 DIAGNOSIS — E83.52 HIGH CALCIUM LEVELS: ICD-10-CM

## 2023-07-11 DIAGNOSIS — Z12.31 ENCOUNTER FOR SCREENING MAMMOGRAM FOR MALIGNANT NEOPLASM OF BREAST: ICD-10-CM

## 2023-07-11 DIAGNOSIS — M54.41 CHRONIC BILATERAL LOW BACK PAIN WITH BILATERAL SCIATICA: ICD-10-CM

## 2023-07-11 DIAGNOSIS — E11.65 TYPE 2 DIABETES MELLITUS WITH HYPERGLYCEMIA, WITHOUT LONG-TERM CURRENT USE OF INSULIN (HCC): Primary | ICD-10-CM

## 2023-07-11 PROCEDURE — 3044F HG A1C LEVEL LT 7.0%: CPT | Performed by: INTERNAL MEDICINE

## 2023-07-11 PROCEDURE — 3074F SYST BP LT 130 MM HG: CPT | Performed by: INTERNAL MEDICINE

## 2023-07-11 PROCEDURE — G8427 DOCREV CUR MEDS BY ELIG CLIN: HCPCS | Performed by: INTERNAL MEDICINE

## 2023-07-11 PROCEDURE — 1036F TOBACCO NON-USER: CPT | Performed by: INTERNAL MEDICINE

## 2023-07-11 PROCEDURE — 2022F DILAT RTA XM EVC RTNOPTHY: CPT | Performed by: INTERNAL MEDICINE

## 2023-07-11 PROCEDURE — 3078F DIAST BP <80 MM HG: CPT | Performed by: INTERNAL MEDICINE

## 2023-07-11 PROCEDURE — G8417 CALC BMI ABV UP PARAM F/U: HCPCS | Performed by: INTERNAL MEDICINE

## 2023-07-11 PROCEDURE — 3017F COLORECTAL CA SCREEN DOC REV: CPT | Performed by: INTERNAL MEDICINE

## 2023-07-11 PROCEDURE — 99214 OFFICE O/P EST MOD 30 MIN: CPT | Performed by: INTERNAL MEDICINE

## 2023-07-11 RX ORDER — METHOCARBAMOL 750 MG/1
750 TABLET, FILM COATED ORAL 2 TIMES DAILY
Qty: 60 TABLET | Refills: 4 | Status: SHIPPED | OUTPATIENT
Start: 2023-07-11 | End: 2023-12-08

## 2023-07-11 RX ORDER — HYDROCHLOROTHIAZIDE 12.5 MG/1
12.5 CAPSULE, GELATIN COATED ORAL EVERY MORNING
Qty: 90 CAPSULE | Refills: 3 | Status: SHIPPED | OUTPATIENT
Start: 2023-07-11

## 2023-07-11 RX ORDER — VERAPAMIL HYDROCHLORIDE 240 MG/1
TABLET, FILM COATED, EXTENDED RELEASE ORAL
Qty: 90 TABLET | Refills: 1 | Status: SHIPPED | OUTPATIENT
Start: 2023-07-11

## 2023-07-11 RX ORDER — SEMAGLUTIDE 1.34 MG/ML
0.25 INJECTION, SOLUTION SUBCUTANEOUS
Qty: 4 ADJUSTABLE DOSE PRE-FILLED PEN SYRINGE | Refills: 1 | Status: SHIPPED | OUTPATIENT
Start: 2023-07-11

## 2023-07-11 RX ORDER — PREGABALIN 150 MG/1
150 CAPSULE ORAL 4 TIMES DAILY
Qty: 180 CAPSULE | Refills: 2 | Status: SHIPPED | OUTPATIENT
Start: 2023-07-11 | End: 2023-11-23

## 2023-07-11 RX ORDER — NAPROXEN 500 MG/1
500 TABLET ORAL 2 TIMES DAILY WITH MEALS
Qty: 180 TABLET | Refills: 1 | Status: SHIPPED | OUTPATIENT
Start: 2023-07-11

## 2023-07-11 RX ORDER — IRBESARTAN 300 MG/1
300 TABLET ORAL DAILY
Qty: 90 TABLET | Refills: 1 | Status: SHIPPED | OUTPATIENT
Start: 2023-07-11

## 2023-07-11 SDOH — ECONOMIC STABILITY: FOOD INSECURITY: WITHIN THE PAST 12 MONTHS, THE FOOD YOU BOUGHT JUST DIDN'T LAST AND YOU DIDN'T HAVE MONEY TO GET MORE.: NEVER TRUE

## 2023-07-11 SDOH — ECONOMIC STABILITY: FOOD INSECURITY: WITHIN THE PAST 12 MONTHS, YOU WORRIED THAT YOUR FOOD WOULD RUN OUT BEFORE YOU GOT MONEY TO BUY MORE.: NEVER TRUE

## 2023-07-11 ASSESSMENT — PATIENT HEALTH QUESTIONNAIRE - PHQ9
SUM OF ALL RESPONSES TO PHQ QUESTIONS 1-9: 2
SUM OF ALL RESPONSES TO PHQ9 QUESTIONS 1 & 2: 2
1. LITTLE INTEREST OR PLEASURE IN DOING THINGS: 1
SUM OF ALL RESPONSES TO PHQ QUESTIONS 1-9: 2
2. FEELING DOWN, DEPRESSED OR HOPELESS: 1

## 2023-07-11 ASSESSMENT — SOCIAL DETERMINANTS OF HEALTH (SDOH): HOW HARD IS IT FOR YOU TO PAY FOR THE VERY BASICS LIKE FOOD, HOUSING, MEDICAL CARE, AND HEATING?: NOT HARD AT ALL

## 2023-07-11 NOTE — TELEPHONE ENCOUNTER
(Key: [id number]) - [id number]  Ozempic (0.25 or 0.5 MG/DOSE) 2MG/3ML pen-injectors  Status: PA RequestCreated: July 11th, 2023 [phone]: July 11th, 2023

## 2023-07-11 NOTE — PROGRESS NOTES
Ms. Tyrese Sullivan is presenting to follow up     CC:  Diabetes       HPI:    Ms. Tyrese Sullivan   is a 66 y.o. female with a hx of       Diabetes: patient went to metformin for 2 months it caused recurrent rash under breast. Wants to try to ozempic  Working on low sugar diet   Lost 30 lbs - no longer eating sugar and bread       BP is lower today 114/66   She is on verapamil 240mg and HCTZ 50mg and avapro 300mg daily   She is tolerating well       She has chronic back pain and is on lyrica 150mg 4 times a day  She is requesting to take methocarbamol 750mg twice day   Requesting naproxen prescription     [employer]  Colonoscopy done   Review of systems:      10 systems reviewed and negative other then HPI     Past Medical History:   Diagnosis Date    Chronic constipation     Hypertension     Obesity, Class II, BMI 35-39.9     Pre-diabetes 2019    A1C- 6.1    PVC (premature ventricular contraction)     States never been to cardio- has had for many years        Past Surgical History:   Procedure Laterality Date     SECTION N/A     x 3    COLONOSCOPY      TONSILLECTOMY      TOTAL KNEE ARTHROPLASTY Left 2019    WISDOM TOOTH EXTRACTION Bilateral        No Known Allergies    Current Outpatient Medications on File Prior to Visit   Medication Sig Dispense Refill    acetaminophen (TYLENOL) 650 MG extended release tablet Take 1 tablet by mouth in the morning and 1 tablet at noon and 1 tablet in the evening. estradiol (ESTRACE) 0.1 MG/GM vaginal cream Place vaginally      famotidine (PEPCID) 20 MG tablet Take 1 tablet by mouth 2 times daily      naloxone 4 MG/0.1ML LIQD nasal spray Use 1 spray intranasally, then discard. Repeat with new spray every 2 min as needed for opioid overdose symptoms, alternating nostrils.   Indications: decrease in rate & depth of breathing due to opioid drug, opioid overdose      oxybutynin (DITROPAN-XL) 5 MG extended release tablet Take 1 tablet by mouth daily

## 2023-07-11 NOTE — PROGRESS NOTES
1. \"Have you been to the ER, urgent care clinic since your last visit? Hospitalized since your last visit? \" No    2. \"Have you seen or consulted any other health care providers outside of the [address] [place] [address] since your last visit? \" No     3. For patients aged 43-73: Has the patient had a colonoscopy / FIT/ Cologuard? Yes - no Care Gap present      If the patient is female:    4. For patients aged 43-66: Has the patient had a mammogram within the past 2 years? No      5. For patients aged 21-65: Has the patient had a pap smear?  No

## 2023-07-12 LAB
ANION GAP SERPL CALC-SCNC: 5 MMOL/L (ref 5–15)
BUN SERPL-MCNC: 27 MG/DL (ref 6–20)
BUN/CREAT SERPL: 40 (ref 12–20)
CALCIUM SERPL-MCNC: 10.1 MG/DL (ref 8.5–10.1)
CALCIUM SERPL-MCNC: 10.2 MG/DL (ref 8.5–10.1)
CHLORIDE SERPL-SCNC: 103 MMOL/L (ref 97–108)
CO2 SERPL-SCNC: 29 MMOL/L (ref 21–32)
CREAT SERPL-MCNC: 0.67 MG/DL (ref 0.55–1.02)
GLUCOSE SERPL-MCNC: 85 MG/DL (ref 65–100)
POTASSIUM SERPL-SCNC: 3.7 MMOL/L (ref 3.5–5.1)
PTH-INTACT SERPL-MCNC: 33.2 PG/ML (ref 18.4–88)
SODIUM SERPL-SCNC: 137 MMOL/L (ref 136–145)

## 2023-07-13 ENCOUNTER — TELEPHONE (OUTPATIENT)
Age: 63
End: 2023-07-13

## 2023-07-13 NOTE — TELEPHONE ENCOUNTER
(Key: [id number])  Rx #: [id number]  Ozempic (0.25 or 0.5 MG/DOSE) 2MG/3ML pen-injectors    [facility] has a denied request on file for [facility] for this member

## 2023-07-27 DIAGNOSIS — E11.65 TYPE 2 DIABETES MELLITUS WITH HYPERGLYCEMIA, WITHOUT LONG-TERM CURRENT USE OF INSULIN (HCC): ICD-10-CM

## 2023-07-27 NOTE — TELEPHONE ENCOUNTER
Pt came into office stating Maricel Loza has been faxing over request for Ozempic. [place] [facility] [employer]     Please call pt to advise.

## 2023-07-27 NOTE — TELEPHONE ENCOUNTER
PCP: Júnior Norris MD    Last appt: 7/11/2023  Future Appointments   Date Time Provider [address]   10/17/2023  1:15 PM Tracy Fontaine MD [facility] BS AMB       Requested Prescriptions     Pending Prescriptions Disp Refills    Semaglutide,0.25 or 0.5MG/DOS, (OZEMPIC, 0.25 OR 0.5 MG/DOSE,) 2 MG/1.5ML SOPN 4 Adjustable Dose Pre-filled Pen Syringe 1     Sig: Inject 0.25 mg into the skin every 7 days

## 2023-07-30 RX ORDER — SEMAGLUTIDE 1.34 MG/ML
0.25 INJECTION, SOLUTION SUBCUTANEOUS
Qty: 4 ADJUSTABLE DOSE PRE-FILLED PEN SYRINGE | Refills: 1 | Status: SHIPPED | OUTPATIENT
Start: 2023-07-30

## 2023-08-02 ENCOUNTER — PATIENT MESSAGE (OUTPATIENT)
Age: 63
End: 2023-08-02

## 2023-08-04 NOTE — TELEPHONE ENCOUNTER
Prior Auth Initiated through [employer], Key: [id number]  Included last 2 office visit progress notes     Outcome pending    Patient updated     Patient is concerned for not having medications for her DM   Does not have glucometer check her BG levels

## 2023-08-04 NOTE — TELEPHONE ENCOUNTER
PA for Ozempic approved until 8/04/2024    [facility] notified of approval, they were able to process fill    Patient updated

## 2023-09-08 RX ORDER — SEMAGLUTIDE 1.34 MG/ML
1 INJECTION, SOLUTION SUBCUTANEOUS
Qty: 4 ML | Refills: 2 | Status: SHIPPED | OUTPATIENT
Start: 2023-09-08 | End: 2023-09-15

## 2023-09-08 NOTE — TELEPHONE ENCOUNTER
Pt states that she would like the ozempic upped in dosage to the next level. Pt will need this called into [address] is saying she is not to get the next dosage until Nov 17 th? She only has medication until 9-12-23    Does pt need a PA?    I am not sure exactly what pt is trying to explain

## 2023-09-17 ENCOUNTER — HOSPITAL ENCOUNTER (OUTPATIENT)
Facility: HOSPITAL | Age: 63
Discharge: HOME | End: 2023-09-20
Payer: COMMERCIAL

## 2023-09-17 DIAGNOSIS — M54.16 RADICULOPATHY OF LUMBAR REGION: ICD-10-CM

## 2023-09-17 PROCEDURE — 72148 MRI LUMBAR SPINE W/O DYE: CPT

## 2023-09-26 DIAGNOSIS — M54.42 CHRONIC BILATERAL LOW BACK PAIN WITH BILATERAL SCIATICA: ICD-10-CM

## 2023-09-26 DIAGNOSIS — G89.29 CHRONIC BILATERAL LOW BACK PAIN WITH BILATERAL SCIATICA: ICD-10-CM

## 2023-09-26 DIAGNOSIS — M54.41 CHRONIC BILATERAL LOW BACK PAIN WITH BILATERAL SCIATICA: ICD-10-CM

## 2023-09-26 RX ORDER — PREGABALIN 150 MG/1
150 CAPSULE ORAL 4 TIMES DAILY
Qty: 180 CAPSULE | Refills: 2 | Status: SHIPPED | OUTPATIENT
Start: 2023-09-26 | End: 2024-02-08

## 2023-09-26 NOTE — TELEPHONE ENCOUNTER
PCP: Chris Royal MD    Last appt:   7/11/2023    Future Appointments   Date Time Provider [address]   11/13/2023  3:00 PM Tracy Berrios MD [facility] BS AMB       Requested Prescriptions     Pending Prescriptions Disp Refills    pregabalin (LYRICA) 150 MG capsule 180 capsule 2     Sig: Take 1 capsule by mouth 4 times daily for 135 days.  Max Daily Amount: 600 mg

## 2023-09-26 NOTE — TELEPHONE ENCOUNTER
Caller requests Refill of:  pregabalin (LYRICA) 150 MG capsule      Please send to:  [facility] [phone] - [facility], [address] [place] [address] [phone] Shin Herrera [phone]          Visit / Appointment History:  Future Appointments:  Future Appointments   Date Time Provider [address]   11/13/2023  3:00 PM Betsy Peterson MD [facility] BS AMB         Last Appointment With Me:  7/11/2023         Caller confirmed instructions and dosages as correct. Caller was advised that Meds will be refilled as soon as possible, however there can be a 48-72 business hour turn around on refill requests.

## 2023-10-08 DIAGNOSIS — I10 ESSENTIAL (PRIMARY) HYPERTENSION: ICD-10-CM

## 2023-10-09 RX ORDER — VERAPAMIL HYDROCHLORIDE 240 MG/1
TABLET, FILM COATED, EXTENDED RELEASE ORAL
Qty: 90 TABLET | Refills: 1 | Status: SHIPPED | OUTPATIENT
Start: 2023-10-09

## 2023-10-27 ENCOUNTER — TELEMEDICINE (OUTPATIENT)
Age: 63
End: 2023-10-27
Payer: COMMERCIAL

## 2023-10-27 ENCOUNTER — TELEPHONE (OUTPATIENT)
Age: 63
End: 2023-10-27

## 2023-10-27 DIAGNOSIS — J40 BRONCHITIS: Primary | ICD-10-CM

## 2023-10-27 PROCEDURE — 99213 OFFICE O/P EST LOW 20 MIN: CPT | Performed by: INTERNAL MEDICINE

## 2023-10-27 PROCEDURE — G8427 DOCREV CUR MEDS BY ELIG CLIN: HCPCS | Performed by: INTERNAL MEDICINE

## 2023-10-27 PROCEDURE — 3017F COLORECTAL CA SCREEN DOC REV: CPT | Performed by: INTERNAL MEDICINE

## 2023-10-27 RX ORDER — BENZONATATE 100 MG/1
100 CAPSULE ORAL 3 TIMES DAILY PRN
Qty: 30 CAPSULE | Refills: 0 | Status: SHIPPED | OUTPATIENT
Start: 2023-10-27 | End: 2023-11-06

## 2023-10-27 RX ORDER — AZITHROMYCIN 250 MG/1
250 TABLET, FILM COATED ORAL SEE ADMIN INSTRUCTIONS
Qty: 6 TABLET | Refills: 0 | Status: SHIPPED | OUTPATIENT
Start: 2023-10-27 | End: 2023-11-01

## 2023-10-27 ASSESSMENT — ENCOUNTER SYMPTOMS: SHORTNESS OF BREATH: 0

## 2023-10-27 NOTE — PROGRESS NOTES
treat with Z-Urban and Tessalon for cough    If cough not improving by next week would consider prednisone taper no imaging needed for now       Depression screen reviewed and negative. Scribed by Roberto Carlos Stinson of [address] [place] [address], as dictated by Dr. Carlos Quesada. Current diagnosis and concerns discussed with pt at length. Pt understands risks and benefits or current treatment plan and medications, and accepts the treatment and medication with any possible risks. Pt asks appropriate questions, which were answered. Pt was instructed to call with any concerns or problems. I have reviewed the note documented by the scribe. The services provided are my own. The documentation is accurate. Farhad Candelaria, was evaluated through a synchronous (real-time) audio-video encounter. The patient (or guardian if applicable) is aware that this is a billable service, which includes applicable co-pays. This Virtual Visit was conducted with patient's (and/or legal guardian's) consent. Patient identification was verified, and a caregiver was present when appropriate. The patient was located at Home: [address]  Provider was located at Home ([address] [place] [address]): [facility] on 10/27/2023 at 1:49 PM  An electronic signature was used to authenticate this note.
No

## 2023-10-28 RX ORDER — FLUCONAZOLE 150 MG/1
150 TABLET ORAL ONCE
Qty: 1 TABLET | Refills: 0 | Status: SHIPPED | OUTPATIENT
Start: 2023-10-28 | End: 2023-10-28

## 2023-10-28 NOTE — TELEPHONE ENCOUNTER
Called pt back    Test was negative    She requests diflucan for yeast infxn to have after abx    Ordered    Answered all questions

## 2023-11-13 ENCOUNTER — OFFICE VISIT (OUTPATIENT)
Age: 63
End: 2023-11-13
Payer: COMMERCIAL

## 2023-11-13 VITALS
HEIGHT: 64 IN | SYSTOLIC BLOOD PRESSURE: 137 MMHG | WEIGHT: 216.8 LBS | HEART RATE: 71 BPM | OXYGEN SATURATION: 98 % | TEMPERATURE: 97.1 F | RESPIRATION RATE: 18 BRPM | BODY MASS INDEX: 37.01 KG/M2 | DIASTOLIC BLOOD PRESSURE: 77 MMHG

## 2023-11-13 DIAGNOSIS — E11.65 TYPE 2 DIABETES MELLITUS WITH HYPERGLYCEMIA, WITHOUT LONG-TERM CURRENT USE OF INSULIN (HCC): ICD-10-CM

## 2023-11-13 DIAGNOSIS — F51.01 PRIMARY INSOMNIA: Primary | ICD-10-CM

## 2023-11-13 DIAGNOSIS — G89.29 CHRONIC BILATERAL LOW BACK PAIN WITH BILATERAL SCIATICA: ICD-10-CM

## 2023-11-13 DIAGNOSIS — Z12.31 ENCOUNTER FOR SCREENING MAMMOGRAM FOR MALIGNANT NEOPLASM OF BREAST: ICD-10-CM

## 2023-11-13 DIAGNOSIS — M54.41 CHRONIC BILATERAL LOW BACK PAIN WITH BILATERAL SCIATICA: ICD-10-CM

## 2023-11-13 DIAGNOSIS — M54.42 CHRONIC BILATERAL LOW BACK PAIN WITH BILATERAL SCIATICA: ICD-10-CM

## 2023-11-13 DIAGNOSIS — I10 PRIMARY HYPERTENSION: ICD-10-CM

## 2023-11-13 PROCEDURE — 3075F SYST BP GE 130 - 139MM HG: CPT | Performed by: INTERNAL MEDICINE

## 2023-11-13 PROCEDURE — 3044F HG A1C LEVEL LT 7.0%: CPT | Performed by: INTERNAL MEDICINE

## 2023-11-13 PROCEDURE — 3078F DIAST BP <80 MM HG: CPT | Performed by: INTERNAL MEDICINE

## 2023-11-13 PROCEDURE — 3017F COLORECTAL CA SCREEN DOC REV: CPT | Performed by: INTERNAL MEDICINE

## 2023-11-13 PROCEDURE — G8427 DOCREV CUR MEDS BY ELIG CLIN: HCPCS | Performed by: INTERNAL MEDICINE

## 2023-11-13 PROCEDURE — 1036F TOBACCO NON-USER: CPT | Performed by: INTERNAL MEDICINE

## 2023-11-13 PROCEDURE — 99214 OFFICE O/P EST MOD 30 MIN: CPT | Performed by: INTERNAL MEDICINE

## 2023-11-13 PROCEDURE — 2022F DILAT RTA XM EVC RTNOPTHY: CPT | Performed by: INTERNAL MEDICINE

## 2023-11-13 PROCEDURE — G8484 FLU IMMUNIZE NO ADMIN: HCPCS | Performed by: INTERNAL MEDICINE

## 2023-11-13 PROCEDURE — G8417 CALC BMI ABV UP PARAM F/U: HCPCS | Performed by: INTERNAL MEDICINE

## 2023-11-13 RX ORDER — NAPROXEN 500 MG/1
500 TABLET ORAL 2 TIMES DAILY WITH MEALS
Qty: 180 TABLET | Refills: 1 | Status: SHIPPED | OUTPATIENT
Start: 2023-11-13

## 2023-11-13 RX ORDER — SEMAGLUTIDE 1.34 MG/ML
1 INJECTION, SOLUTION SUBCUTANEOUS WEEKLY
Qty: 3 ML | Refills: 1 | Status: SHIPPED | OUTPATIENT
Start: 2023-11-13 | End: 2023-11-13 | Stop reason: SDUPTHER

## 2023-11-13 RX ORDER — SEMAGLUTIDE 1.34 MG/ML
0.25 INJECTION, SOLUTION SUBCUTANEOUS
Qty: 4 ADJUSTABLE DOSE PRE-FILLED PEN SYRINGE | Refills: 1 | Status: SHIPPED
Start: 2023-11-13 | End: 2023-11-13 | Stop reason: DRUGHIGH

## 2023-11-13 RX ORDER — METHOCARBAMOL 750 MG/1
750 TABLET, FILM COATED ORAL 2 TIMES DAILY
Qty: 60 TABLET | Refills: 4 | Status: SHIPPED | OUTPATIENT
Start: 2023-11-13 | End: 2024-04-11

## 2023-11-13 RX ORDER — TRAZODONE HYDROCHLORIDE 50 MG/1
50 TABLET ORAL NIGHTLY
Qty: 90 TABLET | Refills: 1 | Status: SHIPPED | OUTPATIENT
Start: 2023-11-13

## 2023-11-13 RX ORDER — SEMAGLUTIDE 1.34 MG/ML
1 INJECTION, SOLUTION SUBCUTANEOUS WEEKLY
Qty: 3 ML | Refills: 1 | Status: SHIPPED | OUTPATIENT
Start: 2023-11-13

## 2023-11-13 SDOH — ECONOMIC STABILITY: HOUSING INSECURITY
IN THE LAST 12 MONTHS, WAS THERE A TIME WHEN YOU DID NOT HAVE A STEADY PLACE TO SLEEP OR SLEPT IN A SHELTER (INCLUDING NOW)?: NO

## 2023-11-13 SDOH — ECONOMIC STABILITY: INCOME INSECURITY: HOW HARD IS IT FOR YOU TO PAY FOR THE VERY BASICS LIKE FOOD, HOUSING, MEDICAL CARE, AND HEATING?: NOT HARD AT ALL

## 2023-11-13 SDOH — ECONOMIC STABILITY: FOOD INSECURITY: WITHIN THE PAST 12 MONTHS, THE FOOD YOU BOUGHT JUST DIDN'T LAST AND YOU DIDN'T HAVE MONEY TO GET MORE.: NEVER TRUE

## 2023-11-13 SDOH — ECONOMIC STABILITY: FOOD INSECURITY: WITHIN THE PAST 12 MONTHS, YOU WORRIED THAT YOUR FOOD WOULD RUN OUT BEFORE YOU GOT MONEY TO BUY MORE.: NEVER TRUE

## 2023-11-13 ASSESSMENT — PATIENT HEALTH QUESTIONNAIRE - PHQ9
SUM OF ALL RESPONSES TO PHQ QUESTIONS 1-9: 0
2. FEELING DOWN, DEPRESSED OR HOPELESS: 0
1. LITTLE INTEREST OR PLEASURE IN DOING THINGS: 0
SUM OF ALL RESPONSES TO PHQ QUESTIONS 1-9: 0
SUM OF ALL RESPONSES TO PHQ9 QUESTIONS 1 & 2: 0

## 2023-11-13 NOTE — PROGRESS NOTES
Ms. Gladys Loza is presenting to follow up     CC:  Hypertension       HPI:    Ms. Gladys Loza   is a 66 y.o. female with a hx of HTN, diabetes and obesity    Diabetes: patient went to metformin for 2 months it caused recurrent rash under breast.   Working on low sugar diet   Lost  60 lbs in past year intentional   HA1C 6.2    patient was started on ozempic . 25mg and increased to 0.5mg  and could not find the 1mg so still at .5       BP is lower today 114/66   She is on verapamil 240mg and HCTZ 50mg and avapro 300mg daily   She is tolerating well         She has chronic back pain and is on lyrica 150mg 4 times a day  She is requesting to take methocarbamol 750mg twice day   Requesting naproxen prescription      Works [employer]  Colonoscopy done     Review of systems:  Constitutional: negative for fever, chills, weight loss, night sweats   Eyes : negative for vision changes, eye pain and discharge  Nose and Throat: negative for tinnitus, sore throat   Cardiovascular: negative for chest pain, palpitations and shortness of breath  Respiratory: negative for shortness of breath, cough and wheezing   Gastroinstestinal: negative for abdominal pain, nausea, vomiting, diarrhea, constipation, and blood in the stool  Musculoskeletal: has chronic back pain   Genitourinary: negative for dysuria, nocturia, polyuria and hematuria   Neurologic: Negative for focal weakness, numbness or incoordination  Skin: negative for rash, pruritus  Hematologic: negative for easy bruising      Past Medical History:   Diagnosis Date    Chronic constipation     Hypertension     Obesity, Class II, BMI 35-39.9     Pre-diabetes 2019    A1C- 6.1    PVC (premature ventricular contraction)     States never been to cardio- has had for many years        Past Surgical History:   Procedure Laterality Date     SECTION N/A     x 3    COLONOSCOPY      TONSILLECTOMY      TOTAL KNEE ARTHROPLASTY Left 2019    WISDOM TOOTH EXTRACTION

## 2023-11-13 NOTE — PROGRESS NOTES
1. \"Have you been to the ER, urgent care clinic since your last visit? Hospitalized since your last visit? \" No    2. \"Have you seen or consulted any other health care providers outside of the [address] [place] [address] since your last visit? \" No     3. For patients aged 43-73: Has the patient had a colonoscopy / FIT/ Cologuard? Yes - no Care Gap present      If the patient is female:    4. For patients aged 43-66: Has the patient had a mammogram within the past 2 years? No      5. For patients aged 21-65: Has the patient had a pap smear?  No

## 2023-11-14 LAB
ALBUMIN SERPL-MCNC: 4.1 G/DL (ref 3.5–5)
ALBUMIN/GLOB SERPL: 1.5 (ref 1.1–2.2)
ALP SERPL-CCNC: 65 U/L (ref 45–117)
ALT SERPL-CCNC: 17 U/L (ref 12–78)
ANION GAP SERPL CALC-SCNC: 6 MMOL/L (ref 5–15)
AST SERPL-CCNC: 12 U/L (ref 15–37)
BILIRUB SERPL-MCNC: 0.3 MG/DL (ref 0.2–1)
BUN SERPL-MCNC: 26 MG/DL (ref 6–20)
BUN/CREAT SERPL: 42 (ref 12–20)
CALCIUM SERPL-MCNC: 9.7 MG/DL (ref 8.5–10.1)
CHLORIDE SERPL-SCNC: 108 MMOL/L (ref 97–108)
CHOLEST SERPL-MCNC: 172 MG/DL
CO2 SERPL-SCNC: 28 MMOL/L (ref 21–32)
CREAT SERPL-MCNC: 0.62 MG/DL (ref 0.55–1.02)
EST. AVERAGE GLUCOSE BLD GHB EST-MCNC: 111 MG/DL
GLOBULIN SER CALC-MCNC: 2.8 G/DL (ref 2–4)
GLUCOSE SERPL-MCNC: 81 MG/DL (ref 65–100)
HBA1C MFR BLD: 5.5 % (ref 4–5.6)
HDLC SERPL-MCNC: 39 MG/DL
HDLC SERPL: 4.4 (ref 0–5)
LDLC SERPL CALC-MCNC: 112 MG/DL (ref 0–100)
POTASSIUM SERPL-SCNC: 3.8 MMOL/L (ref 3.5–5.1)
PROT SERPL-MCNC: 6.9 G/DL (ref 6.4–8.2)
SODIUM SERPL-SCNC: 142 MMOL/L (ref 136–145)
TRIGL SERPL-MCNC: 105 MG/DL
VLDLC SERPL CALC-MCNC: 21 MG/DL

## 2024-02-02 DIAGNOSIS — E11.65 TYPE 2 DIABETES MELLITUS WITH HYPERGLYCEMIA, WITHOUT LONG-TERM CURRENT USE OF INSULIN (HCC): ICD-10-CM

## 2024-02-02 RX ORDER — SEMAGLUTIDE 1.34 MG/ML
1 INJECTION, SOLUTION SUBCUTANEOUS WEEKLY
Qty: 3 ML | Refills: 1 | Status: SHIPPED | OUTPATIENT
Start: 2024-02-02 | End: 2024-05-02

## 2024-02-02 NOTE — TELEPHONE ENCOUNTER
Semaglutide, 1 MG/DOSE, (OZEMPIC, 1 MG/DOSE,) 4 MG/3ML SOPN    Pharm has some right now and is asking if you can do this today?  Pt is out of medication    Refill to [facility] #[phone]      Pt strongly advised of 48/72 hour turn around on refills.

## 2024-03-07 DIAGNOSIS — I10 ESSENTIAL (PRIMARY) HYPERTENSION: ICD-10-CM

## 2024-03-07 RX ORDER — VERAPAMIL HYDROCHLORIDE 240 MG/1
TABLET, FILM COATED, EXTENDED RELEASE ORAL
Qty: 90 TABLET | Refills: 1 | Status: SHIPPED | OUTPATIENT
Start: 2024-03-07

## 2024-04-19 DIAGNOSIS — E11.65 TYPE 2 DIABETES MELLITUS WITH HYPERGLYCEMIA, WITHOUT LONG-TERM CURRENT USE OF INSULIN (HCC): ICD-10-CM

## 2024-04-19 RX ORDER — SEMAGLUTIDE 1.34 MG/ML
1 INJECTION, SOLUTION SUBCUTANEOUS WEEKLY
Qty: 3 ML | Refills: 1 | Status: SHIPPED | OUTPATIENT
Start: 2024-04-19 | End: 2024-07-18

## 2024-04-19 NOTE — TELEPHONE ENCOUNTER
PCP: Tracy Tan MD    Last appt:   11/13/2023    Future Appointments   Date Time Provider Department Center   5/13/2024  1:45 PM Tracy Tan MD [facility] BS AMB       Requested Prescriptions     Pending Prescriptions Disp Refills    Semaglutide, 1 MG/DOSE, (OZEMPIC, 1 MG/DOSE,) 4 MG/3ML SOPN 3 mL 1     Sig: Inject 1 mg into the skin once a week

## 2024-04-19 NOTE — TELEPHONE ENCOUNTER
Semaglutide, 1 MG/DOSE, (OZEMPIC, 1 MG/DOSE,) 4 MG/3ML SOPN    Pt has been out for two weeks.  Pt states that pharm has requested twice now.  Pt is not feeling well.  Can this be sent today?    I advised if she didn't get from now on in 3 days to call us.     [facility] #[phone]

## 2024-05-10 DIAGNOSIS — M54.41 CHRONIC BILATERAL LOW BACK PAIN WITH BILATERAL SCIATICA: ICD-10-CM

## 2024-05-10 DIAGNOSIS — G89.29 CHRONIC BILATERAL LOW BACK PAIN WITH BILATERAL SCIATICA: ICD-10-CM

## 2024-05-10 DIAGNOSIS — M54.42 CHRONIC BILATERAL LOW BACK PAIN WITH BILATERAL SCIATICA: ICD-10-CM

## 2024-05-10 RX ORDER — NAPROXEN 500 MG/1
500 TABLET ORAL 2 TIMES DAILY WITH MEALS
Qty: 180 TABLET | Refills: 1 | Status: SHIPPED | OUTPATIENT
Start: 2024-05-10

## 2024-05-10 NOTE — TELEPHONE ENCOUNTER
PCP: Tracy Tan MD    Last appt:   11/13/2023    Future Appointments   Date Time Provider Department Center   5/30/2024  2:30 PM Tracy Tan MD [facility] BS AMB       Requested Prescriptions     Pending Prescriptions Disp Refills    naproxen (NAPROSYN) 500 MG tablet 180 tablet 1     Sig: Take 1 tablet by mouth 2 times daily (with meals)

## 2024-05-10 NOTE — TELEPHONE ENCOUNTER
During reminder call, patient requested a refill for pain medication. Patient states it is for back pain and not a muscle relaxer.     Pharmacy on file has been confirmed.

## 2024-05-21 DIAGNOSIS — M54.41 CHRONIC BILATERAL LOW BACK PAIN WITH BILATERAL SCIATICA: ICD-10-CM

## 2024-05-21 DIAGNOSIS — E11.65 TYPE 2 DIABETES MELLITUS WITH HYPERGLYCEMIA, WITHOUT LONG-TERM CURRENT USE OF INSULIN (HCC): ICD-10-CM

## 2024-05-21 DIAGNOSIS — M54.42 CHRONIC BILATERAL LOW BACK PAIN WITH BILATERAL SCIATICA: ICD-10-CM

## 2024-05-21 DIAGNOSIS — I10 ESSENTIAL (PRIMARY) HYPERTENSION: ICD-10-CM

## 2024-05-21 DIAGNOSIS — G89.29 CHRONIC BILATERAL LOW BACK PAIN WITH BILATERAL SCIATICA: ICD-10-CM

## 2024-05-21 RX ORDER — MELOXICAM 15 MG/1
15 TABLET ORAL DAILY
Qty: 30 TABLET | Refills: 0 | Status: SHIPPED | OUTPATIENT
Start: 2024-05-21

## 2024-05-21 RX ORDER — MELOXICAM 15 MG/1
1 TABLET ORAL DAILY
COMMUNITY
Start: 2019-08-26 | End: 2024-05-21 | Stop reason: SDUPTHER

## 2024-05-21 RX ORDER — SEMAGLUTIDE 1.34 MG/ML
1 INJECTION, SOLUTION SUBCUTANEOUS WEEKLY
Qty: 3 ML | Refills: 1 | Status: SHIPPED | OUTPATIENT
Start: 2024-05-21 | End: 2024-08-19

## 2024-05-21 RX ORDER — PREGABALIN 150 MG/1
150 CAPSULE ORAL 4 TIMES DAILY
Qty: 180 CAPSULE | Refills: 2 | Status: SHIPPED | OUTPATIENT
Start: 2024-05-21 | End: 2024-10-03

## 2024-05-21 RX ORDER — IRBESARTAN 300 MG/1
300 TABLET ORAL DAILY
Qty: 90 TABLET | Refills: 1 | Status: SHIPPED | OUTPATIENT
Start: 2024-05-21

## 2024-05-21 NOTE — TELEPHONE ENCOUNTER
PCP: Tracy Tan MD    Last appt:   11/13/2023    Future Appointments   Date Time Provider Department Center   5/30/2024  2:30 PM Tracy Tan MD [facility] BS AMB   6/4/2024  3:00 PM Tracy Tan MD [facility] BS AMB       Requested Prescriptions     Pending Prescriptions Disp Refills    pregabalin (LYRICA) 150 MG capsule 180 capsule 2     Sig: Take 1 capsule by mouth 4 times daily for 135 days. Max Daily Amount: 600 mg    Semaglutide, 1 MG/DOSE, (OZEMPIC, 1 MG/DOSE,) 4 MG/3ML SOPN sc injection 3 mL 1     Sig: Inject 1 mg into the skin once a week    irbesartan (AVAPRO) 300 MG tablet 90 tablet 1     Sig: Take 1 tablet by mouth daily    meloxicam (MOBIC) 15 MG tablet 30 tablet 0     Sig: Take 1 tablet by mouth daily   Patient wants to know if we can prescribe  800mg ibuprofen for Back pain .      Your COVID 19 test can take 1-2 days for the results to come back. We ask that you make a Mychart page and view your test results this way. You are encouraged to Self quarantine until you know your results. If positive, please work on contact tracing. Increase fluids and rest  Saline nasal spray as needed for nasal congestion  Warm salt water gargles as needed for throat discomfort  Monitor temperature twice a day  Tylenol as needed for fevers and/or discomfort. Big deep breaths periodically throughout the day  Regular Mucinex over the counter as needed for chest congestion  If symptoms worsen -Go to the ER. Follow up with your primary care provider      To Whom it May Concern:    Crystal Romero was tested for COVID-19 9/21/2023. He/she must stay home until test results are back. If test is negative, ok to return to work/school. If test is positive, isolate for a total of 5 days, starting from day 1 of symptom onset. After 5 days, if fever-free for 24 hours and there has been a gradual improvement in symptoms, may come out of isolation, but must consistently wear a mask when around other people for 5 additional days. (5 days isolation, 5 days mask-wearing). We do not recommend retesting as patients may continue to test positive for months even though no longer contagious. It is suggested you call [facility] or [facility] [address] with any questions regarding isolation timeframe/return to work/school details.

## 2024-05-21 NOTE — TELEPHONE ENCOUNTER
PCP: Tracy Tan MD    Last appt:   11/13/2023    Future Appointments   Date Time Provider Department Center   5/30/2024  2:30 PM Tracy Tan MD [facility] BS AMB   6/4/2024  3:00 PM Tracy Tan MD [facility] BS AMB       Requested Prescriptions     Pending Prescriptions Disp Refills    pregabalin (LYRICA) 150 MG capsule 180 capsule 2     Sig: Take 1 capsule by mouth 4 times daily for 135 days. Max Daily Amount: 600 mg    Semaglutide, 1 MG/DOSE, (OZEMPIC, 1 MG/DOSE,) 4 MG/3ML SOPN sc injection 3 mL 1     Sig: Inject 1 mg into the skin once a week      pulse oximetry

## 2024-07-08 PROBLEM — R10.31 RIGHT LOWER QUADRANT PAIN: Status: ACTIVE | Noted: 2021-09-20

## 2024-07-09 DIAGNOSIS — I10 ESSENTIAL (PRIMARY) HYPERTENSION: ICD-10-CM

## 2024-07-10 DIAGNOSIS — E11.65 TYPE 2 DIABETES MELLITUS WITH HYPERGLYCEMIA, WITHOUT LONG-TERM CURRENT USE OF INSULIN (HCC): ICD-10-CM

## 2024-07-10 DIAGNOSIS — I10 ESSENTIAL (PRIMARY) HYPERTENSION: ICD-10-CM

## 2024-07-10 RX ORDER — SEMAGLUTIDE 1.34 MG/ML
1 INJECTION, SOLUTION SUBCUTANEOUS WEEKLY
Qty: 3 ML | Refills: 1 | Status: CANCELLED | OUTPATIENT
Start: 2024-07-10 | End: 2024-10-08

## 2024-07-10 RX ORDER — HYDROCHLOROTHIAZIDE 12.5 MG/1
12.5 CAPSULE, GELATIN COATED ORAL EVERY MORNING
Qty: 90 CAPSULE | Refills: 3 | Status: SHIPPED | OUTPATIENT
Start: 2024-07-10 | End: 2024-07-10 | Stop reason: SDUPTHER

## 2024-07-11 RX ORDER — HYDROCHLOROTHIAZIDE 12.5 MG/1
12.5 CAPSULE, GELATIN COATED ORAL EVERY MORNING
Qty: 90 CAPSULE | Refills: 1 | Status: SHIPPED | OUTPATIENT
Start: 2024-07-11

## 2024-07-12 RX ORDER — SEMAGLUTIDE 2.68 MG/ML
2 INJECTION, SOLUTION SUBCUTANEOUS
Qty: 3 ML | Refills: 2 | Status: SHIPPED | OUTPATIENT
Start: 2024-07-12

## (undated) DEVICE — SOLUTION IV 1000ML 0.9% SOD CHL

## (undated) DEVICE — 3M™ IOBAN™ 2 ANTIMICROBIAL INCISE DRAPE 6651EZ: Brand: IOBAN™ 2

## (undated) DEVICE — ZIMMER® STERILE DISPOSABLE TOURNIQUET CUFF WITH PLC, DUAL PORT, SINGLE BLADDER, 34 IN. (86 CM)

## (undated) DEVICE — STERILE POLYISOPRENE POWDER-FREE SURGICAL GLOVES: Brand: PROTEXIS

## (undated) DEVICE — SYR 50ML LR LCK 1ML GRAD NSAF --

## (undated) DEVICE — SUTURE VCRL SZ 2 L54IN ABSRB UD L65MM TP-1 1/2 CIR J880T

## (undated) DEVICE — HANDPIECE SET WITH BONE CLEANING TIP AND SUCTION TUBE: Brand: INTERPULSE

## (undated) DEVICE — GOWN,SIRUS,NONRNF,SETINSLV,XL,20/CS: Brand: MEDLINE

## (undated) DEVICE — PIN BNE FIX 4X140MM STRL -- 1EA=2PK MAKO

## (undated) DEVICE — NEEDLE HYPO 18GA L1.5IN PNK S STL HUB POLYPR SHLD REG BVL

## (undated) DEVICE — STERILE POLYISOPRENE POWDER-FREE SURGICAL GLOVES WITH EMOLLIENT COATING: Brand: PROTEXIS

## (undated) DEVICE — SUTURE STRATAFIX SYMMETRIC PDS + SZ 1 L18IN ABSRB VLT L48MM SXPP1A400

## (undated) DEVICE — HEADLESS TROCHAR PIN 75MM: Brand: ZUK

## (undated) DEVICE — WATERPROOF, BACTERIA PROOF DRESSING WITH ABSORBENT SEE THROUGH PAD: Brand: OPSITE POST-OP VISIBLE 35X10CM CTN 20

## (undated) DEVICE — SUTURE VCRL SZ 2-0 L18IN ABSRB UD CT-1 L36MM 1/2 CIR J839D

## (undated) DEVICE — KENDALL SCD EXPRESS SLEEVES, KNEE LENGTH, MEDIUM: Brand: KENDALL SCD

## (undated) DEVICE — SYR 20ML LL STRL LF --

## (undated) DEVICE — SMOKE EVACUATION PENCIL: Brand: VALLEYLAB

## (undated) DEVICE — SOLUTION IRRIG 1000ML STRL H2O USP PLAS POUR BTL

## (undated) DEVICE — APPLICATOR BNDG 1MM ADH PREMIERPRO EXOFIN

## (undated) DEVICE — KIT LEG POS DISP -- MAKO

## (undated) DEVICE — HANDPIECE SET WITH COAXIAL HIGH FLOW TIP AND SUCTION TUBE: Brand: INTERPULSE

## (undated) DEVICE — BOWL BNE CEM MIX SPAT CURET SMARTMIX CTS

## (undated) DEVICE — (D)PREP SKN CHLRAPRP APPL 26ML -- CONVERT TO ITEM 371833

## (undated) DEVICE — KIT DRP FOR RIO ROBOTIC ARM ASST SYS

## (undated) DEVICE — BLADE SAW W083XL354IN THK0047IN CUT THK0047IN SAG FLR

## (undated) DEVICE — BANDAGE COMPR SELF ADH 5 YDX4 IN TAN STRL PREMIERPRO LF

## (undated) DEVICE — SUTURE MCRYL SZ 3-0 L27IN ABSRB UD L24MM PS-1 3/8 CIR PRIM Y936H

## (undated) DEVICE — BLADE SURG SAW STD S STL OSC W/ SERR EDGE DISP

## (undated) DEVICE — SOLUTION SURG PREP 26 CC PURPREP

## (undated) DEVICE — HOOK LOCK LATEX FREE ELASTIC BANDAGE D/L 6INX10YD

## (undated) DEVICE — DRSG POSTOP PRMSL AG 3.5X14IN

## (undated) DEVICE — STRYKER PERFORMANCE SERIES SAGITTAL BLADE: Brand: STRYKER PERFORMANCE SERIES

## (undated) DEVICE — PADDING CAST SPEC 6INX4YD COT --

## (undated) DEVICE — FLOSEAL HEMOSTATIC MATRIX, 10 ML: Brand: FLOSEAL

## (undated) DEVICE — REM POLYHESIVE ADULT PATIENT RETURN ELECTRODE: Brand: VALLEYLAB

## (undated) DEVICE — KIT INT FIX FEM TIB CKPT MAKOPLASTY

## (undated) DEVICE — INFECTION CONTROL KIT SYS

## (undated) DEVICE — SOLUTION IRRIG 3000ML 0.9% SOD CHL FLX CONT 0797208] ICU MEDICAL INC]

## (undated) DEVICE — Device

## (undated) DEVICE — PIN BNE 4X110MM STRL -- 2/PK MAKO

## (undated) DEVICE — T4 HOOD

## (undated) DEVICE — YANKAUER OPEN TIP, NO VENT: Brand: ARGYLE

## (undated) DEVICE — PADDING CST 6IN STERILE --

## (undated) DEVICE — 4-PORT MANIFOLD: Brand: NEPTUNE 2

## (undated) DEVICE — SUTURE VCRL SZ 0 L36IN ABSRB VLT L40MM CT 1/2 CIR J358H

## (undated) DEVICE — SOLUTION IRRIG 3000ML 0.9% SOD CHL USP UROMATIC PLAS CONT

## (undated) DEVICE — TOTAL TRAY, 16FR 10ML SIL FOLEY, URN: Brand: MEDLINE

## (undated) DEVICE — SUTURE VCRL SZ 2-0 L36IN ABSRB UD L40MM CT 1/2 CIR J957H

## (undated) DEVICE — TAPE,CLOTH/SILK,CURAD,3"X10YD,LF,40/CS: Brand: CURAD

## (undated) DEVICE — LIGHT HANDLE: Brand: DEVON

## (undated) DEVICE — DRAPE,EXTREMITY,89X128,STERILE: Brand: MEDLINE

## (undated) DEVICE — BLADE SAW W073XL276IN THK0031IN CUT THK0036IN REPL SAG

## (undated) DEVICE — SUTURE VCRL SZ 1 L36IN ABSRB UD L36MM CT-1 1/2 CIR J947H

## (undated) DEVICE — BANDAGE COMPR M W6INXL10YD WHT BGE VELC E MTRX HK AND LOOP

## (undated) DEVICE — BLADE SAW W0.49XL3.15IN THK0.047IN CUT THK0.047IN REPL SAG

## (undated) DEVICE — CONTAINER,SPECIMEN,3OZ,OR STRL: Brand: MEDLINE

## (undated) DEVICE — ZIMMER® STERILE DISPOSABLE TOURNIQUET CUFF WITH PROTECTIVE SLEEVE AND PLC, DUAL PORT, SINGLE BLADDER, 34 IN. (86 CM)

## (undated) DEVICE — HOOD: Brand: FLYTE, SURGICOOL

## (undated) DEVICE — KIT PROC KNE TRACKING PK/1 -- VIZADISC MAKO

## (undated) DEVICE — SUTURE STRATAFIX SPRL SZ 1 L14IN ABSRB VLT L48CM CTX 1/2 SXPD2B405

## (undated) DEVICE — SOLUTION IRRIG 1000ML H2O STRL BLT

## (undated) DEVICE — SCRUB DRY SURG EZ SCRUB BRUSH PREOPERATIVE GRN

## (undated) DEVICE — DEVON™ KNEE AND BODY STRAP 60" X 3" (1.5 M X 7.6 CM): Brand: DEVON